# Patient Record
Sex: FEMALE | Race: WHITE | Employment: OTHER | URBAN - METROPOLITAN AREA
[De-identification: names, ages, dates, MRNs, and addresses within clinical notes are randomized per-mention and may not be internally consistent; named-entity substitution may affect disease eponyms.]

---

## 2017-02-24 RX ORDER — LOSARTAN POTASSIUM 25 MG/1
TABLET ORAL
Qty: 90 TABLET | Refills: 0 | OUTPATIENT
Start: 2017-02-24

## 2017-02-24 RX ORDER — ATORVASTATIN CALCIUM 10 MG/1
TABLET, FILM COATED ORAL
Qty: 90 TABLET | Refills: 0 | OUTPATIENT
Start: 2017-02-24

## 2017-03-23 LAB
ABSOLUTE BASO #: 0.1 K/UL (ref 0–0.1)
ABSOLUTE EOS #: 0.3 K/UL (ref 0.1–0.4)
ABSOLUTE LYMPH #: 1.4 K/UL (ref 0.8–5.2)
ABSOLUTE MONO #: 0.4 K/UL (ref 0.1–0.9)
ABSOLUTE NEUT #: 3 K/UL (ref 1.3–9.1)
ANION GAP SERPL CALCULATED.3IONS-SCNC: 13 MMOL/L
BASOPHILS RELATIVE PERCENT: 1.8 %
BUN BLDV-MCNC: 17 MG/DL (ref 10–20)
CALCIUM SERPL-MCNC: 9.7 MG/DL (ref 8.7–10.8)
CHLORIDE BLD-SCNC: 103 MMOL/L (ref 95–111)
CO2: 28 MMOL/L (ref 21–32)
CREAT SERPL-MCNC: 1.2 MG/DL (ref 0.5–1.3)
EGFR AFRICAN AMERICAN: 54
EGFR IF NONAFRICAN AMERICAN: 44
EOSINOPHILS RELATIVE PERCENT: 5.8 %
GLUCOSE: 92 MG/DL (ref 70–100)
HCT VFR BLD CALC: 39.1 % (ref 36–48)
HEMOGLOBIN: 13 G/DL (ref 12–16)
LYMPHOCYTE %: 26.8 %
MCH RBC QN AUTO: 28.5 PG (ref 27–34)
MCHC RBC AUTO-ENTMCNC: 33.2 G/DL (ref 31–36)
MCV RBC AUTO: 85.7 FL (ref 80–100)
MONOCYTES # BLD: 6.8 %
NEUTROPHILS RELATIVE PERCENT: 58.6 %
PDW BLD-RTO: 12.5 % (ref 10.8–14.8)
PLATELETS: 295 K/UL (ref 150–450)
POTASSIUM SERPL-SCNC: 5.4 MMOL/L (ref 3.5–5.4)
RBC: 4.56 M/UL (ref 4–5.5)
SODIUM BLD-SCNC: 139 MMOL/L (ref 134–147)
WBC: 5.1 K/UL (ref 3.7–10.8)

## 2017-03-29 ENCOUNTER — OFFICE VISIT (OUTPATIENT)
Dept: NEPHROLOGY | Age: 72
End: 2017-03-29

## 2017-03-29 ENCOUNTER — OFFICE VISIT (OUTPATIENT)
Dept: FAMILY MEDICINE CLINIC | Age: 72
End: 2017-03-29

## 2017-03-29 VITALS
SYSTOLIC BLOOD PRESSURE: 128 MMHG | DIASTOLIC BLOOD PRESSURE: 86 MMHG | HEIGHT: 58 IN | HEART RATE: 106 BPM | RESPIRATION RATE: 18 BRPM | OXYGEN SATURATION: 97 % | BODY MASS INDEX: 27.08 KG/M2 | WEIGHT: 129 LBS

## 2017-03-29 VITALS
TEMPERATURE: 97.8 F | WEIGHT: 128.6 LBS | HEART RATE: 96 BPM | SYSTOLIC BLOOD PRESSURE: 116 MMHG | BODY MASS INDEX: 26.88 KG/M2 | RESPIRATION RATE: 20 BRPM | DIASTOLIC BLOOD PRESSURE: 78 MMHG

## 2017-03-29 DIAGNOSIS — J01.00 ACUTE NON-RECURRENT MAXILLARY SINUSITIS: Primary | ICD-10-CM

## 2017-03-29 DIAGNOSIS — J32.0 CHRONIC MAXILLARY SINUSITIS: ICD-10-CM

## 2017-03-29 DIAGNOSIS — N18.30 CKD (CHRONIC KIDNEY DISEASE) STAGE 3, GFR 30-59 ML/MIN (HCC): Primary | ICD-10-CM

## 2017-03-29 PROCEDURE — G8484 FLU IMMUNIZE NO ADMIN: HCPCS | Performed by: INTERNAL MEDICINE

## 2017-03-29 PROCEDURE — 1123F ACP DISCUSS/DSCN MKR DOCD: CPT | Performed by: FAMILY MEDICINE

## 2017-03-29 PROCEDURE — 3017F COLORECTAL CA SCREEN DOC REV: CPT | Performed by: INTERNAL MEDICINE

## 2017-03-29 PROCEDURE — 1090F PRES/ABSN URINE INCON ASSESS: CPT | Performed by: FAMILY MEDICINE

## 2017-03-29 PROCEDURE — G8420 CALC BMI NORM PARAMETERS: HCPCS | Performed by: INTERNAL MEDICINE

## 2017-03-29 PROCEDURE — 3014F SCREEN MAMMO DOC REV: CPT | Performed by: FAMILY MEDICINE

## 2017-03-29 PROCEDURE — 3017F COLORECTAL CA SCREEN DOC REV: CPT | Performed by: FAMILY MEDICINE

## 2017-03-29 PROCEDURE — 99213 OFFICE O/P EST LOW 20 MIN: CPT | Performed by: INTERNAL MEDICINE

## 2017-03-29 PROCEDURE — 99213 OFFICE O/P EST LOW 20 MIN: CPT | Performed by: FAMILY MEDICINE

## 2017-03-29 PROCEDURE — G8427 DOCREV CUR MEDS BY ELIG CLIN: HCPCS | Performed by: FAMILY MEDICINE

## 2017-03-29 PROCEDURE — 4040F PNEUMOC VAC/ADMIN/RCVD: CPT | Performed by: FAMILY MEDICINE

## 2017-03-29 PROCEDURE — 1036F TOBACCO NON-USER: CPT | Performed by: INTERNAL MEDICINE

## 2017-03-29 PROCEDURE — G8420 CALC BMI NORM PARAMETERS: HCPCS | Performed by: FAMILY MEDICINE

## 2017-03-29 PROCEDURE — G8399 PT W/DXA RESULTS DOCUMENT: HCPCS | Performed by: FAMILY MEDICINE

## 2017-03-29 PROCEDURE — 1123F ACP DISCUSS/DSCN MKR DOCD: CPT | Performed by: INTERNAL MEDICINE

## 2017-03-29 PROCEDURE — 4040F PNEUMOC VAC/ADMIN/RCVD: CPT | Performed by: INTERNAL MEDICINE

## 2017-03-29 PROCEDURE — G8427 DOCREV CUR MEDS BY ELIG CLIN: HCPCS | Performed by: INTERNAL MEDICINE

## 2017-03-29 PROCEDURE — G8399 PT W/DXA RESULTS DOCUMENT: HCPCS | Performed by: INTERNAL MEDICINE

## 2017-03-29 PROCEDURE — 1036F TOBACCO NON-USER: CPT | Performed by: FAMILY MEDICINE

## 2017-03-29 PROCEDURE — 3014F SCREEN MAMMO DOC REV: CPT | Performed by: INTERNAL MEDICINE

## 2017-03-29 PROCEDURE — G8484 FLU IMMUNIZE NO ADMIN: HCPCS | Performed by: FAMILY MEDICINE

## 2017-03-29 PROCEDURE — 1090F PRES/ABSN URINE INCON ASSESS: CPT | Performed by: INTERNAL MEDICINE

## 2017-03-29 RX ORDER — CEFUROXIME AXETIL 250 MG/1
250 TABLET ORAL 2 TIMES DAILY
Qty: 42 TABLET | Refills: 0 | Status: SHIPPED | OUTPATIENT
Start: 2017-03-29 | End: 2017-04-19

## 2017-03-29 ASSESSMENT — ENCOUNTER SYMPTOMS
DIARRHEA: 0
SORE THROAT: 0
NAUSEA: 0
RHINORRHEA: 0
ABDOMINAL PAIN: 0
CHEST TIGHTNESS: 0
SHORTNESS OF BREATH: 0
VOMITING: 0
EYES NEGATIVE: 1
BACK PAIN: 0
COUGH: 1

## 2017-05-09 ENCOUNTER — OFFICE VISIT (OUTPATIENT)
Dept: FAMILY MEDICINE CLINIC | Age: 72
End: 2017-05-09

## 2017-05-09 VITALS
TEMPERATURE: 97.9 F | HEART RATE: 92 BPM | BODY MASS INDEX: 26.75 KG/M2 | RESPIRATION RATE: 12 BRPM | SYSTOLIC BLOOD PRESSURE: 112 MMHG | WEIGHT: 128 LBS | DIASTOLIC BLOOD PRESSURE: 70 MMHG

## 2017-05-09 DIAGNOSIS — J01.00 ACUTE MAXILLARY SINUSITIS, RECURRENCE NOT SPECIFIED: Primary | ICD-10-CM

## 2017-05-09 PROCEDURE — 4040F PNEUMOC VAC/ADMIN/RCVD: CPT | Performed by: FAMILY MEDICINE

## 2017-05-09 PROCEDURE — 1036F TOBACCO NON-USER: CPT | Performed by: FAMILY MEDICINE

## 2017-05-09 PROCEDURE — 96372 THER/PROPH/DIAG INJ SC/IM: CPT | Performed by: FAMILY MEDICINE

## 2017-05-09 PROCEDURE — 3017F COLORECTAL CA SCREEN DOC REV: CPT | Performed by: FAMILY MEDICINE

## 2017-05-09 PROCEDURE — 99213 OFFICE O/P EST LOW 20 MIN: CPT | Performed by: FAMILY MEDICINE

## 2017-05-09 PROCEDURE — 1090F PRES/ABSN URINE INCON ASSESS: CPT | Performed by: FAMILY MEDICINE

## 2017-05-09 PROCEDURE — 1123F ACP DISCUSS/DSCN MKR DOCD: CPT | Performed by: FAMILY MEDICINE

## 2017-05-09 PROCEDURE — G8420 CALC BMI NORM PARAMETERS: HCPCS | Performed by: FAMILY MEDICINE

## 2017-05-09 PROCEDURE — G8399 PT W/DXA RESULTS DOCUMENT: HCPCS | Performed by: FAMILY MEDICINE

## 2017-05-09 PROCEDURE — 3014F SCREEN MAMMO DOC REV: CPT | Performed by: FAMILY MEDICINE

## 2017-05-09 PROCEDURE — G8427 DOCREV CUR MEDS BY ELIG CLIN: HCPCS | Performed by: FAMILY MEDICINE

## 2017-05-09 RX ORDER — METHYLPREDNISOLONE ACETATE 80 MG/ML
160 INJECTION, SUSPENSION INTRA-ARTICULAR; INTRALESIONAL; INTRAMUSCULAR; SOFT TISSUE ONCE
Status: COMPLETED | OUTPATIENT
Start: 2017-05-09 | End: 2017-05-09

## 2017-05-09 RX ORDER — DOXYCYCLINE HYCLATE 100 MG/1
100 CAPSULE ORAL 2 TIMES DAILY
Qty: 20 CAPSULE | Refills: 0 | Status: SHIPPED | OUTPATIENT
Start: 2017-05-09 | End: 2017-05-19

## 2017-05-09 RX ADMIN — METHYLPREDNISOLONE ACETATE 160 MG: 80 INJECTION, SUSPENSION INTRA-ARTICULAR; INTRALESIONAL; INTRAMUSCULAR; SOFT TISSUE at 09:35

## 2017-05-09 ASSESSMENT — ENCOUNTER SYMPTOMS
VOMITING: 0
NAUSEA: 0
SINUS PRESSURE: 1
ABDOMINAL PAIN: 0
DIARRHEA: 0
SORE THROAT: 0
BACK PAIN: 0
EYES NEGATIVE: 1
COUGH: 1
SHORTNESS OF BREATH: 0
CHEST TIGHTNESS: 0
RHINORRHEA: 0

## 2017-05-22 ENCOUNTER — TELEPHONE (OUTPATIENT)
Dept: FAMILY MEDICINE CLINIC | Age: 72
End: 2017-05-22

## 2017-05-22 DIAGNOSIS — R05.3 CHRONIC COUGH: Primary | ICD-10-CM

## 2017-05-25 ENCOUNTER — TELEPHONE (OUTPATIENT)
Dept: FAMILY MEDICINE CLINIC | Age: 72
End: 2017-05-25

## 2017-05-25 DIAGNOSIS — J32.8 OTHER CHRONIC SINUSITIS: Primary | ICD-10-CM

## 2017-05-26 ENCOUNTER — TELEPHONE (OUTPATIENT)
Dept: FAMILY MEDICINE CLINIC | Age: 72
End: 2017-05-26

## 2017-06-06 ENCOUNTER — TELEPHONE (OUTPATIENT)
Dept: FAMILY MEDICINE CLINIC | Age: 72
End: 2017-06-06

## 2017-06-06 DIAGNOSIS — R05.3 CHRONIC COUGH: Primary | ICD-10-CM

## 2017-06-28 ENCOUNTER — TELEPHONE (OUTPATIENT)
Dept: FAMILY MEDICINE CLINIC | Age: 72
End: 2017-06-28

## 2017-06-28 DIAGNOSIS — R05.9 COUGH: Primary | ICD-10-CM

## 2017-06-28 DIAGNOSIS — R94.2 ABNORMAL PFTS (PULMONARY FUNCTION TESTS): ICD-10-CM

## 2017-06-30 RX ORDER — ALBUTEROL SULFATE 90 UG/1
2 AEROSOL, METERED RESPIRATORY (INHALATION) EVERY 6 HOURS PRN
Qty: 1 INHALER | Refills: 3 | Status: SHIPPED | OUTPATIENT
Start: 2017-06-30 | End: 2018-02-08 | Stop reason: ALTCHOICE

## 2017-09-19 LAB
ABSOLUTE BASO #: 0.1 K/UL (ref 0–0.1)
ABSOLUTE EOS #: 0.2 K/UL (ref 0.1–0.4)
ABSOLUTE LYMPH #: 1.3 K/UL (ref 0.8–5.2)
ABSOLUTE MONO #: 0.4 K/UL (ref 0.1–0.9)
ABSOLUTE NEUT #: 3.4 K/UL (ref 1.3–9.1)
ANION GAP SERPL CALCULATED.3IONS-SCNC: 14 MMOL/L
BASOPHILS RELATIVE PERCENT: 1.3 %
BUN BLDV-MCNC: 22 MG/DL (ref 10–20)
CALCIUM SERPL-MCNC: 9.6 MG/DL (ref 8.7–10.8)
CHLORIDE BLD-SCNC: 103 MMOL/L (ref 95–111)
CO2: 27 MMOL/L (ref 21–32)
CREAT SERPL-MCNC: 1.2 MG/DL (ref 0.5–1.3)
EGFR AFRICAN AMERICAN: 54
EGFR IF NONAFRICAN AMERICAN: 44
EOSINOPHILS RELATIVE PERCENT: 4.2 %
GLUCOSE: 93 MG/DL (ref 70–100)
HCT VFR BLD CALC: 37.1 % (ref 36–48)
HEMOGLOBIN: 12.5 G/DL (ref 12–16)
LYMPHOCYTE %: 24.5 %
MCH RBC QN AUTO: 29.6 PG (ref 27–34)
MCHC RBC AUTO-ENTMCNC: 33.7 G/DL (ref 31–36)
MCV RBC AUTO: 87.7 FL (ref 80–100)
MONOCYTES # BLD: 7.6 %
NEUTROPHILS RELATIVE PERCENT: 62 %
PDW BLD-RTO: 12.4 % (ref 10.8–14.8)
PLATELETS: 298 K/UL (ref 150–450)
POTASSIUM SERPL-SCNC: 5.2 MMOL/L (ref 3.5–5.4)
RBC: 4.23 M/UL (ref 4–5.5)
SODIUM BLD-SCNC: 139 MMOL/L (ref 134–147)
WBC: 5.4 K/UL (ref 3.7–10.8)

## 2017-10-25 ENCOUNTER — OFFICE VISIT (OUTPATIENT)
Dept: NEPHROLOGY | Age: 72
End: 2017-10-25
Payer: MEDICARE

## 2017-10-25 VITALS
HEIGHT: 58 IN | SYSTOLIC BLOOD PRESSURE: 118 MMHG | OXYGEN SATURATION: 98 % | HEART RATE: 93 BPM | DIASTOLIC BLOOD PRESSURE: 64 MMHG | BODY MASS INDEX: 25.61 KG/M2 | WEIGHT: 122 LBS | RESPIRATION RATE: 18 BRPM

## 2017-10-25 DIAGNOSIS — N18.30 CKD (CHRONIC KIDNEY DISEASE), STAGE III (HCC): Primary | ICD-10-CM

## 2017-10-25 PROCEDURE — 3017F COLORECTAL CA SCREEN DOC REV: CPT | Performed by: INTERNAL MEDICINE

## 2017-10-25 PROCEDURE — G8484 FLU IMMUNIZE NO ADMIN: HCPCS | Performed by: INTERNAL MEDICINE

## 2017-10-25 PROCEDURE — 99213 OFFICE O/P EST LOW 20 MIN: CPT | Performed by: INTERNAL MEDICINE

## 2017-10-25 PROCEDURE — G8427 DOCREV CUR MEDS BY ELIG CLIN: HCPCS | Performed by: INTERNAL MEDICINE

## 2017-10-25 PROCEDURE — 4040F PNEUMOC VAC/ADMIN/RCVD: CPT | Performed by: INTERNAL MEDICINE

## 2017-10-25 PROCEDURE — 3014F SCREEN MAMMO DOC REV: CPT | Performed by: INTERNAL MEDICINE

## 2017-10-25 PROCEDURE — 1090F PRES/ABSN URINE INCON ASSESS: CPT | Performed by: INTERNAL MEDICINE

## 2017-10-25 PROCEDURE — 1123F ACP DISCUSS/DSCN MKR DOCD: CPT | Performed by: INTERNAL MEDICINE

## 2017-10-25 PROCEDURE — G8417 CALC BMI ABV UP PARAM F/U: HCPCS | Performed by: INTERNAL MEDICINE

## 2017-10-25 PROCEDURE — 1036F TOBACCO NON-USER: CPT | Performed by: INTERNAL MEDICINE

## 2017-10-25 PROCEDURE — G8399 PT W/DXA RESULTS DOCUMENT: HCPCS | Performed by: INTERNAL MEDICINE

## 2017-10-25 NOTE — PROGRESS NOTES
09/18/2017    HCT 37.1 09/18/2017    MCV 87.7 09/18/2017     09/18/2017     BMP:    Lab Results   Component Value Date     09/18/2017     03/22/2017     12/14/2016    K 5.2 09/18/2017    K 5.4 03/22/2017    K 4.7 12/14/2016     09/18/2017     03/22/2017     12/14/2016    CO2 27 09/18/2017    CO2 28 03/22/2017    CO2 28 12/14/2016    BUN 22 (H) 09/18/2017    BUN 17 03/22/2017    BUN 18 12/14/2016    CREATININE 1.2 09/18/2017    CREATININE 1.2 03/22/2017    CREATININE 1.3 12/14/2016    GLUCOSE 93 09/18/2017    GLUCOSE 92 03/22/2017    GLUCOSE 93 12/14/2016      Hepatic:   Lab Results   Component Value Date    AST 27 12/14/2016    AST 26 12/10/2015    AST 23 12/03/2014    ALT 25 12/14/2016    ALT 20 12/10/2015    ALT 21 12/03/2014    BILITOT 0.5 12/14/2016    BILITOT 0.6 12/10/2015    BILITOT 0.6 12/03/2014    ALKPHOS 78 12/14/2016    ALKPHOS 86 12/10/2015    ALKPHOS 76 12/03/2014     BNP: No results found for: BNP  Lipids:   Lab Results   Component Value Date    CHOL 182 12/14/2016    HDL 71 (H) 12/14/2016     INR: No results found for: INR  URINE: No results found for: NAUR, PROTUR  Lab Results   Component Value Date    NITRU Negative 09/03/2015    COLORU YELLOW 05/08/2014    PHUR 6.5 09/03/2015    RBCUA 0 04/16/2014    YEAST 0 04/16/2014    BACTERIA 0 04/16/2014    CLARITYU Clear 04/16/2014    LEUKOCYTESUR neg 09/03/2015    LEUKOCYTESUR NEGATIVE 05/08/2014    UROBILINOGEN 0.2 05/08/2014    BILIRUBINUR NEGATIVE 05/08/2014    BLOODU Negative 09/03/2015    GLUCOSEU neg 09/03/2015    KETUA Negative 09/03/2015      Microalbumen/Creatinine ratio:  No components found for: RUCREAT        Medications:    Current Outpatient Prescriptions   Medication Sig Dispense Refill    albuterol sulfate HFA (PROAIR HFA) 108 (90 Base) MCG/ACT inhaler Inhale 2 puffs into the lungs every 6 hours as needed for Wheezing 1 Inhaler 3    lansoprazole (PREVACID SOLUTAB) 15 MG disintegrating tablet Take 15 mg by mouth daily      ALPRAZolam (XANAX) 0.25 MG tablet Take 1 tablet by mouth nightly as needed for Sleep or Anxiety 90 tablet 0    atorvastatin (LIPITOR) 10 MG tablet TAKE 1 TABLET DAILY 90 tablet 3    losartan (COZAAR) 25 MG tablet Take 1 tablet by mouth daily 90 tablet 3    calcitonin (MIACALCIN) 200 UNIT/ACT nasal spray 1 spray by Nasal route daily 3 Bottle 3    bumetanide (BUMEX) 0.5 MG tablet TAKE 1 TABLET BY MOUTH AS NEEDED 30 tablet 3    acetaminophen (TYLENOL) 500 MG tablet Take 1,000 mg by mouth as needed.  Calcium + D (CALTRATE) 600-200 MG-UNIT tablet Take 2 tablets by mouth daily.  aspirin 81 MG EC tablet Take 81 mg by mouth daily.  therapeutic multivitamin-minerals (THERAGRAN-M) tablet Take 1 tablet by mouth daily. No current facility-administered medications for this visit. 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 We discussed the eGFR today. 2. We will continue all current medications without changes. 3. We will see the patient back in 6 months. Total time spent interviewing the patient and/or family, evaluating lab data and X-ray data and processing orders was 20 minutes. I personally spent more than 50% of the visit time face to face with the patient providing counseling and coordination of the patient's care.             _________________________________  Mague Moralez DO  [facility]      Sravanthi Rhoades MD

## 2017-11-02 ENCOUNTER — OFFICE VISIT (OUTPATIENT)
Dept: PULMONOLOGY | Age: 72
End: 2017-11-02
Payer: MEDICARE

## 2017-11-02 VITALS
SYSTOLIC BLOOD PRESSURE: 118 MMHG | WEIGHT: 121.8 LBS | OXYGEN SATURATION: 99 % | DIASTOLIC BLOOD PRESSURE: 72 MMHG | TEMPERATURE: 97.5 F | HEIGHT: 58 IN | BODY MASS INDEX: 25.57 KG/M2 | HEART RATE: 95 BPM

## 2017-11-02 DIAGNOSIS — R05.9 COUGH: Primary | ICD-10-CM

## 2017-11-02 PROCEDURE — 4040F PNEUMOC VAC/ADMIN/RCVD: CPT | Performed by: INTERNAL MEDICINE

## 2017-11-02 PROCEDURE — G8417 CALC BMI ABV UP PARAM F/U: HCPCS | Performed by: INTERNAL MEDICINE

## 2017-11-02 PROCEDURE — 99203 OFFICE O/P NEW LOW 30 MIN: CPT | Performed by: INTERNAL MEDICINE

## 2017-11-02 PROCEDURE — 3014F SCREEN MAMMO DOC REV: CPT | Performed by: INTERNAL MEDICINE

## 2017-11-02 PROCEDURE — G8399 PT W/DXA RESULTS DOCUMENT: HCPCS | Performed by: INTERNAL MEDICINE

## 2017-11-02 PROCEDURE — 3017F COLORECTAL CA SCREEN DOC REV: CPT | Performed by: INTERNAL MEDICINE

## 2017-11-02 PROCEDURE — 1123F ACP DISCUSS/DSCN MKR DOCD: CPT | Performed by: INTERNAL MEDICINE

## 2017-11-02 PROCEDURE — 1036F TOBACCO NON-USER: CPT | Performed by: INTERNAL MEDICINE

## 2017-11-02 PROCEDURE — G8484 FLU IMMUNIZE NO ADMIN: HCPCS | Performed by: INTERNAL MEDICINE

## 2017-11-02 PROCEDURE — 1090F PRES/ABSN URINE INCON ASSESS: CPT | Performed by: INTERNAL MEDICINE

## 2017-11-02 PROCEDURE — G8427 DOCREV CUR MEDS BY ELIG CLIN: HCPCS | Performed by: INTERNAL MEDICINE

## 2017-11-02 ASSESSMENT — ENCOUNTER SYMPTOMS
TROUBLE SWALLOWING: 0
VOICE CHANGE: 0
FACIAL SWELLING: 0
SHORTNESS OF BREATH: 0
CHEST TIGHTNESS: 0
WHEEZING: 0
CHOKING: 0
SORE THROAT: 0
COUGH: 1
STRIDOR: 0

## 2017-11-02 NOTE — PROGRESS NOTES
 BREAST SURGERY  2009    Lumpectomy of the Left breast     SECTION  1971 and [address] [facility]  2006    COLONOSCOPY  2004    HEMORRHOID SURGERY  2004    HYSTERECTOMY  1985    SKIN BIOPSY Right 2013    Squemous cell cancer Right leg    SKIN BIOPSY Left 2013    Squemous cell cancer Left leg    SKIN CANCER EXCISION Left 13    Exc SCC Left Lower Leg with flap    TOTAL NEPHRECTOMY  2006    right     Social History   Substance Use Topics    Smoking status: Never Smoker    Smokeless tobacco: Never Used    Alcohol use No      Allergies   Allergen Reactions    Cipro Xr Swelling and Rash      Family History   Problem Relation Age of Onset    Cancer Mother      melanoma, tongue    Hypertension Mother     High Cholesterol Mother     Heart Failure Father      Current Outpatient Prescriptions   Medication Sig Dispense Refill    albuterol sulfate HFA (PROAIR HFA) 108 (90 Base) MCG/ACT inhaler Inhale 2 puffs into the lungs every 6 hours as needed for Wheezing 1 Inhaler 3    lansoprazole (PREVACID SOLUTAB) 15 MG disintegrating tablet Take 15 mg by mouth daily      ALPRAZolam (XANAX) 0.25 MG tablet Take 1 tablet by mouth nightly as needed for Sleep or Anxiety 90 tablet 0    atorvastatin (LIPITOR) 10 MG tablet TAKE 1 TABLET DAILY 90 tablet 3    losartan (COZAAR) 25 MG tablet Take 1 tablet by mouth daily 90 tablet 3    calcitonin (MIACALCIN) 200 UNIT/ACT nasal spray 1 spray by Nasal route daily 3 Bottle 3    bumetanide (BUMEX) 0.5 MG tablet TAKE 1 TABLET BY MOUTH AS NEEDED 30 tablet 3    acetaminophen (TYLENOL) 500 MG tablet Take 1,000 mg by mouth as needed.  Calcium + D (CALTRATE) 600-200 MG-UNIT tablet Take 2 tablets by mouth daily.  aspirin 81 MG EC tablet Take 81 mg by mouth daily.  therapeutic multivitamin-minerals (THERAGRAN-M) tablet Take 1 tablet by mouth daily. No current facility-administered medications for this visit.       LABS - none    BP

## 2017-12-16 ENCOUNTER — HOSPITAL ENCOUNTER (EMERGENCY)
Dept: GENERAL RADIOLOGY | Age: 72
Discharge: HOME | End: 2017-12-16
Payer: MEDICARE

## 2017-12-16 ENCOUNTER — HOSPITAL ENCOUNTER (EMERGENCY)
Age: 72
Discharge: HOME | End: 2017-12-16
Payer: MEDICARE

## 2017-12-16 VITALS
RESPIRATION RATE: 18 BRPM | OXYGEN SATURATION: 97 % | HEIGHT: 58 IN | WEIGHT: 118 LBS | BODY MASS INDEX: 24.77 KG/M2 | TEMPERATURE: 97.9 F | HEART RATE: 96 BPM | DIASTOLIC BLOOD PRESSURE: 69 MMHG | SYSTOLIC BLOOD PRESSURE: 105 MMHG
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
## 2017-12-16 DIAGNOSIS — S00.83XA CONTUSION OF FOREHEAD, INITIAL ENCOUNTER: ICD-10-CM
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
## 2017-12-16 DIAGNOSIS — S00.33XA CONTUSION OF NOSE, INITIAL ENCOUNTER: ICD-10-CM

## 2017-12-16 DIAGNOSIS — S01.511A LIP LACERATION, INITIAL ENCOUNTER: ICD-10-CM

## 2017-12-16 PROCEDURE — 99213 OFFICE O/P EST LOW 20 MIN: CPT | Performed by: NURSE PRACTITIONER

## 2017-12-16 PROCEDURE — 99214 OFFICE O/P EST MOD 30 MIN: CPT

## 2017-12-16 PROCEDURE — 70150 X-RAY EXAM OF FACIAL BONES: CPT

## 2017-12-16 RX ORDER — MUPIROCIN CALCIUM 20 MG/G
CREAM TOPICAL
Qty: 1 TUBE | Refills: 0 | Status: SHIPPED | OUTPATIENT
Start: 2017-12-16 | End: 2018-01-15

## 2017-12-16 ASSESSMENT — ENCOUNTER SYMPTOMS
SHORTNESS OF BREATH: 0
VOMITING: 0
ABDOMINAL PAIN: 0
DIARRHEA: 0
CHEST TIGHTNESS: 0
FACIAL SWELLING: 1
NAUSEA: 0

## 2017-12-16 ASSESSMENT — PAIN DESCRIPTION - FREQUENCY: FREQUENCY: CONTINUOUS

## 2017-12-16 ASSESSMENT — PAIN DESCRIPTION - LOCATION: LOCATION: FACE;HEAD

## 2017-12-16 ASSESSMENT — PAIN DESCRIPTION - PAIN TYPE: TYPE: ACUTE PAIN

## 2017-12-16 ASSESSMENT — PAIN DESCRIPTION - ORIENTATION: ORIENTATION: RIGHT

## 2017-12-16 ASSESSMENT — PAIN DESCRIPTION - DESCRIPTORS: DESCRIPTORS: ACHING

## 2017-12-16 ASSESSMENT — PAIN SCALES - GENERAL: PAINLEVEL_OUTOF10: 5

## 2017-12-16 NOTE — ED TRIAGE NOTES
Patient to room with c/o facial injury post fall this morning. Patient states, \"I was at my house, and I was going down the stairs. I slid and hit the post at the bottom of the stairs with the right side of my face and head. \" Discoloration and edema noted to forehead. Denies nausea. Denies loss of consciousness. States bloody nose at time of accident.

## 2017-12-16 NOTE — ED PROVIDER NOTES
teaching(s) and printed instructions attached to after visit summary. PATIENT REFERRED TO:  Jo Carbone MD  [address] [facility] [address] [phone]    Schedule an appointment as soon as possible for a visit in 2 days  for further evalation, As needed, If symptoms worsen, GO DIRECTLY TO [facility]  [address]  [phone]    As needed, If symptoms worsen, GO DIRECTLY TO THE EMERGENCY DEPARTMENT    DISCHARGE MEDICATIONS:  Discharge Medication List as of 12/16/2017  1:55 PM      START taking these medications    Details   mupirocin (BACTROBAN) 2 % cream Apply topically 3 times daily. , Disp-1 Tube, R-0, Print           Discharge Medication List as of 12/16/2017  1:55 PM          Myles Pettit, [address] [place] [address], [facility]  12/16/17 [id number]

## 2018-02-08 ENCOUNTER — OFFICE VISIT (OUTPATIENT)
Dept: FAMILY MEDICINE CLINIC | Age: 73
End: 2018-02-08
Payer: MEDICARE

## 2018-02-08 VITALS
RESPIRATION RATE: 16 BRPM | SYSTOLIC BLOOD PRESSURE: 126 MMHG | WEIGHT: 121.2 LBS | TEMPERATURE: 97.7 F | DIASTOLIC BLOOD PRESSURE: 84 MMHG | HEART RATE: 92 BPM | BODY MASS INDEX: 25.33 KG/M2

## 2018-02-08 DIAGNOSIS — M85.80 OSTEOPENIA, UNSPECIFIED LOCATION: ICD-10-CM

## 2018-02-08 DIAGNOSIS — Z91.81 AT HIGH RISK FOR FALLS: ICD-10-CM

## 2018-02-08 DIAGNOSIS — E78.5 HYPERLIPIDEMIA, UNSPECIFIED HYPERLIPIDEMIA TYPE: Primary | Chronic | ICD-10-CM

## 2018-02-08 DIAGNOSIS — I10 ESSENTIAL HYPERTENSION: Chronic | ICD-10-CM

## 2018-02-08 PROCEDURE — G8417 CALC BMI ABV UP PARAM F/U: HCPCS | Performed by: FAMILY MEDICINE

## 2018-02-08 PROCEDURE — 1123F ACP DISCUSS/DSCN MKR DOCD: CPT | Performed by: FAMILY MEDICINE

## 2018-02-08 PROCEDURE — 1090F PRES/ABSN URINE INCON ASSESS: CPT | Performed by: FAMILY MEDICINE

## 2018-02-08 PROCEDURE — 1036F TOBACCO NON-USER: CPT | Performed by: FAMILY MEDICINE

## 2018-02-08 PROCEDURE — 99214 OFFICE O/P EST MOD 30 MIN: CPT | Performed by: FAMILY MEDICINE

## 2018-02-08 PROCEDURE — G8399 PT W/DXA RESULTS DOCUMENT: HCPCS | Performed by: FAMILY MEDICINE

## 2018-02-08 PROCEDURE — 3017F COLORECTAL CA SCREEN DOC REV: CPT | Performed by: FAMILY MEDICINE

## 2018-02-08 PROCEDURE — G8484 FLU IMMUNIZE NO ADMIN: HCPCS | Performed by: FAMILY MEDICINE

## 2018-02-08 PROCEDURE — 3014F SCREEN MAMMO DOC REV: CPT | Performed by: FAMILY MEDICINE

## 2018-02-08 PROCEDURE — 4040F PNEUMOC VAC/ADMIN/RCVD: CPT | Performed by: FAMILY MEDICINE

## 2018-02-08 PROCEDURE — G8427 DOCREV CUR MEDS BY ELIG CLIN: HCPCS | Performed by: FAMILY MEDICINE

## 2018-02-08 RX ORDER — ATORVASTATIN CALCIUM 10 MG/1
TABLET, FILM COATED ORAL
Qty: 90 TABLET | Refills: 3 | Status: SHIPPED | OUTPATIENT
Start: 2018-02-08 | End: 2018-03-02 | Stop reason: SDUPTHER

## 2018-02-08 RX ORDER — LOSARTAN POTASSIUM 25 MG/1
25 TABLET ORAL DAILY
Qty: 90 TABLET | Refills: 3 | Status: SHIPPED | OUTPATIENT
Start: 2018-02-08 | End: 2018-03-02 | Stop reason: SDUPTHER

## 2018-02-08 RX ORDER — CALCITONIN SALMON 200 [IU]/.09ML
1 SPRAY, METERED NASAL DAILY
Qty: 3 BOTTLE | Refills: 3 | Status: SHIPPED | OUTPATIENT
Start: 2018-02-08 | End: 2018-03-02 | Stop reason: SDUPTHER

## 2018-02-08 RX ORDER — ALPRAZOLAM 0.25 MG/1
0.25 TABLET ORAL NIGHTLY PRN
Qty: 90 TABLET | Refills: 0 | Status: SHIPPED | OUTPATIENT
Start: 2018-02-08 | End: 2018-05-09

## 2018-02-08 ASSESSMENT — PATIENT HEALTH QUESTIONNAIRE - PHQ9
SUM OF ALL RESPONSES TO PHQ9 QUESTIONS 1 & 2: 0
2. FEELING DOWN, DEPRESSED OR HOPELESS: 0
SUM OF ALL RESPONSES TO PHQ QUESTIONS 1-9: 0
1. LITTLE INTEREST OR PLEASURE IN DOING THINGS: 0

## 2018-02-12 ASSESSMENT — ENCOUNTER SYMPTOMS
COUGH: 0
EYES NEGATIVE: 1
ABDOMINAL PAIN: 0
CHEST TIGHTNESS: 0
BACK PAIN: 0
DIARRHEA: 0
NAUSEA: 0
RHINORRHEA: 0
SHORTNESS OF BREATH: 0
VOMITING: 0
SORE THROAT: 0

## 2018-02-12 NOTE — PROGRESS NOTES
Squemous cell cancer Left leg    SKIN CANCER EXCISION Left 12/2/13    Exc SCC Left Lower Leg with flap    TOTAL NEPHRECTOMY  2006    right     Family History   Problem Relation Age of Onset   Rene Perdue Cancer Mother      melanoma, tongue    Hypertension Mother     High Cholesterol Mother     Heart Failure Father      Social History   Substance Use Topics    Smoking status: Never Smoker    Smokeless tobacco: Never Used    Alcohol use No      Current Outpatient Prescriptions   Medication Sig Dispense Refill    ALPRAZolam (XANAX) 0.25 MG tablet Take 1 tablet by mouth nightly as needed for Sleep or Anxiety for up to 90 days. 90 tablet 0    atorvastatin (LIPITOR) 10 MG tablet TAKE 1 TABLET DAILY 90 tablet 3    losartan (COZAAR) 25 MG tablet Take 1 tablet by mouth daily 90 tablet 3    calcitonin (MIACALCIN) 200 UNIT/ACT nasal spray 1 spray by Nasal route daily 3 Bottle 3    lansoprazole (PREVACID SOLUTAB) 15 MG disintegrating tablet Take 15 mg by mouth daily      bumetanide (BUMEX) 0.5 MG tablet TAKE 1 TABLET BY MOUTH AS NEEDED 30 tablet 3    acetaminophen (TYLENOL) 500 MG tablet Take 1,000 mg by mouth as needed.  Calcium + D (CALTRATE) 600-200 MG-UNIT tablet Take 2 tablets by mouth daily.  aspirin 81 MG EC tablet Take 81 mg by mouth daily.  therapeutic multivitamin-minerals (THERAGRAN-M) tablet Take 1 tablet by mouth daily. No current facility-administered medications for this visit.       Allergies   Allergen Reactions    Cipro Xr Swelling and Rash     Health Maintenance   Topic Date Due    DTaP/Tdap/Td vaccine (1 - Tdap) 01/02/1997    Potassium monitoring  09/18/2018    Creatinine monitoring  09/18/2018    Breast cancer screen  05/08/2019    Lipid screen  12/14/2021    Colon cancer screen colonoscopy  04/24/2025    Zostavax vaccine  Completed    DEXA (modify frequency per FRAX score)  Completed    Flu vaccine  Completed    Pneumococcal low/med risk  Completed    Hepatitis C

## 2018-02-13 LAB
ALBUMIN SERPL-MCNC: 4.5 G/DL (ref 3.5–5.2)
ALK PHOSPHATASE: 78 U/L (ref 30–146)
ALT SERPL-CCNC: 21 U/L (ref 5–40)
AST SERPL-CCNC: 23 U/L (ref 9–40)
BILIRUB SERPL-MCNC: 0.7 MG/DL
BILIRUBIN DIRECT: <0.2 MG/DL
CHOLESTEROL/HDL RATIO: 2.5
CHOLESTEROL: 173 MG/DL
HDLC SERPL-MCNC: 69 MG/DL
LDL CHOLESTEROL CALCULATED: 85 MG/DL
LDL/HDL RATIO: 1.2
TOTAL PROTEIN: 6.9 G/DL (ref 6.1–8.3)
TRIGL SERPL-MCNC: 97 MG/DL
VLDLC SERPL CALC-MCNC: 19 MG/DL

## 2018-03-04 RX ORDER — CALCITONIN SALMON 200 [IU]/.09ML
SPRAY, METERED NASAL
Qty: 3 BOTTLE | Refills: 3 | Status: SHIPPED | OUTPATIENT
Start: 2018-03-04 | End: 2019-02-27 | Stop reason: SDUPTHER

## 2018-03-04 RX ORDER — ATORVASTATIN CALCIUM 10 MG/1
TABLET, FILM COATED ORAL
Qty: 90 TABLET | Refills: 3 | Status: SHIPPED | OUTPATIENT
Start: 2018-03-04 | End: 2019-02-27 | Stop reason: SDUPTHER

## 2018-03-04 RX ORDER — LOSARTAN POTASSIUM 25 MG/1
TABLET ORAL
Qty: 90 TABLET | Refills: 3 | Status: SHIPPED | OUTPATIENT
Start: 2018-03-04 | End: 2019-02-27 | Stop reason: SDUPTHER

## 2018-04-18 ENCOUNTER — HOSPITAL ENCOUNTER (OUTPATIENT)
Age: 73
Discharge: HOME | End: 2018-04-18
Payer: MEDICARE

## 2018-04-18 ENCOUNTER — HOSPITAL ENCOUNTER (OUTPATIENT)
Dept: ULTRASOUND IMAGING | Age: 73
Discharge: HOME | End: 2018-04-18
Payer: MEDICARE

## 2018-04-18 DIAGNOSIS — N18.30 CKD (CHRONIC KIDNEY DISEASE), STAGE III (HCC): ICD-10-CM

## 2018-04-18 LAB
ANION GAP SERPL CALCULATED.3IONS-SCNC: 12 MEQ/L (ref 8–16)
BASOPHILS # BLD: 1.2 %
BASOPHILS ABSOLUTE: 0.1 THOU/MM3 (ref 0–0.1)
BUN BLDV-MCNC: 18 MG/DL (ref 7–22)
CALCIUM SERPL-MCNC: 9.8 MG/DL (ref 8.5–10.5)
CHLORIDE BLD-SCNC: 101 MEQ/L (ref 98–111)
CO2: 26 MEQ/L (ref 23–33)
CREAT SERPL-MCNC: 1.1 MG/DL (ref 0.4–1.2)
EOSINOPHIL # BLD: 6.4 %
EOSINOPHILS ABSOLUTE: 0.4 THOU/MM3 (ref 0–0.4)
GFR SERPL CREATININE-BSD FRML MDRD: 49 ML/MIN/1.73M2
GLUCOSE BLD-MCNC: 93 MG/DL (ref 70–108)
HCT VFR BLD CALC: 37.1 % (ref 37–47)
HEMOGLOBIN: 12.9 GM/DL (ref 12–16)
LYMPHOCYTES # BLD: 28.5 %
LYMPHOCYTES ABSOLUTE: 1.6 THOU/MM3 (ref 1–4.8)
MCH RBC QN AUTO: 30 PG (ref 27–31)
MCHC RBC AUTO-ENTMCNC: 34.8 GM/DL (ref 33–37)
MCV RBC AUTO: 86.4 FL (ref 81–99)
MONOCYTES # BLD: 7.3 %
MONOCYTES ABSOLUTE: 0.4 THOU/MM3 (ref 0.4–1.3)
NUCLEATED RED BLOOD CELLS: 0 /100 WBC
PDW BLD-RTO: 13.4 % (ref 11.5–14.5)
PLATELET # BLD: 327 THOU/MM3 (ref 130–400)
PMV BLD AUTO: 7.3 FL (ref 7.4–10.4)
POTASSIUM SERPL-SCNC: 4.8 MEQ/L (ref 3.5–5.2)
RBC # BLD: 4.3 MILL/MM3 (ref 4.2–5.4)
SEG NEUTROPHILS: 56.6 %
SEGMENTED NEUTROPHILS ABSOLUTE COUNT: 3.2 THOU/MM3 (ref 1.8–7.7)
SODIUM BLD-SCNC: 139 MEQ/L (ref 135–145)
WBC # BLD: 5.7 THOU/MM3 (ref 4.8–10.8)

## 2018-04-18 PROCEDURE — 85025 COMPLETE CBC W/AUTO DIFF WBC: CPT

## 2018-04-18 PROCEDURE — 80048 BASIC METABOLIC PNL TOTAL CA: CPT

## 2018-04-18 PROCEDURE — 36415 COLL VENOUS BLD VENIPUNCTURE: CPT

## 2018-04-18 PROCEDURE — 76770 US EXAM ABDO BACK WALL COMP: CPT

## 2018-04-25 ENCOUNTER — OFFICE VISIT (OUTPATIENT)
Dept: NEPHROLOGY | Age: 73
End: 2018-04-25
Payer: MEDICARE

## 2018-04-25 VITALS
RESPIRATION RATE: 18 BRPM | OXYGEN SATURATION: 97 % | BODY MASS INDEX: 25.4 KG/M2 | SYSTOLIC BLOOD PRESSURE: 130 MMHG | DIASTOLIC BLOOD PRESSURE: 84 MMHG | WEIGHT: 121 LBS | HEIGHT: 58 IN | HEART RATE: 110 BPM

## 2018-04-25 DIAGNOSIS — N18.30 CKD (CHRONIC KIDNEY DISEASE), STAGE III (HCC): Primary | ICD-10-CM

## 2018-04-25 PROCEDURE — 1090F PRES/ABSN URINE INCON ASSESS: CPT | Performed by: INTERNAL MEDICINE

## 2018-04-25 PROCEDURE — 3017F COLORECTAL CA SCREEN DOC REV: CPT | Performed by: INTERNAL MEDICINE

## 2018-04-25 PROCEDURE — G8427 DOCREV CUR MEDS BY ELIG CLIN: HCPCS | Performed by: INTERNAL MEDICINE

## 2018-04-25 PROCEDURE — G8417 CALC BMI ABV UP PARAM F/U: HCPCS | Performed by: INTERNAL MEDICINE

## 2018-04-25 PROCEDURE — 4040F PNEUMOC VAC/ADMIN/RCVD: CPT | Performed by: INTERNAL MEDICINE

## 2018-04-25 PROCEDURE — 99213 OFFICE O/P EST LOW 20 MIN: CPT | Performed by: INTERNAL MEDICINE

## 2018-04-25 PROCEDURE — G8399 PT W/DXA RESULTS DOCUMENT: HCPCS | Performed by: INTERNAL MEDICINE

## 2018-04-25 PROCEDURE — 1036F TOBACCO NON-USER: CPT | Performed by: INTERNAL MEDICINE

## 2018-04-25 PROCEDURE — 1123F ACP DISCUSS/DSCN MKR DOCD: CPT | Performed by: INTERNAL MEDICINE

## 2018-05-01 ENCOUNTER — HOSPITAL ENCOUNTER (OUTPATIENT)
Dept: WOMENS IMAGING | Age: 73
Discharge: HOME | End: 2018-05-01
Payer: MEDICARE

## 2018-05-01 DIAGNOSIS — Z78.0 POST-MENOPAUSAL: ICD-10-CM

## 2018-05-01 PROCEDURE — 77080 DXA BONE DENSITY AXIAL: CPT

## 2018-10-17 LAB
ABSOLUTE BASO #: 0.1 K/UL (ref 0–0.1)
ABSOLUTE EOS #: 0.4 K/UL (ref 0.1–0.4)
ABSOLUTE LYMPH #: 1.7 K/UL (ref 0.8–5.2)
ABSOLUTE MONO #: 0.4 K/UL (ref 0.1–0.9)
ABSOLUTE NEUT #: 2.9 K/UL (ref 1.3–9.1)
ANION GAP SERPL CALCULATED.3IONS-SCNC: 13 MEQ/L (ref 10–19)
BASOPHILS RELATIVE PERCENT: 1.6 %
BUN BLDV-MCNC: 17 MG/DL (ref 8–23)
CALCIUM SERPL-MCNC: 10 MG/DL (ref 8.5–10.5)
CHLORIDE BLD-SCNC: 104 MEQ/L (ref 95–107)
CO2: 25 MEQ/L (ref 19–31)
CREAT SERPL-MCNC: 1.3 MG/DL (ref 0.6–1.3)
EGFR AFRICAN AMERICAN: 47.8 ML/MIN/1.73 M2
EGFR IF NONAFRICAN AMERICAN: 41.2 ML/MIN/1.73 M2
EOSINOPHILS RELATIVE PERCENT: 7.2 %
GLUCOSE: 95 MG/DL (ref 70–99)
HCT VFR BLD CALC: 37.8 % (ref 36–48)
HEMOGLOBIN: 12.5 G/DL (ref 12–16)
LYMPHOCYTE %: 30.4 %
MCH RBC QN AUTO: 28.6 PG (ref 27–34)
MCHC RBC AUTO-ENTMCNC: 33.1 G/DL (ref 31–36)
MCV RBC AUTO: 86.5 FL (ref 80–100)
MONOCYTES # BLD: 7.7 %
NEUTROPHILS RELATIVE PERCENT: 52.9 %
PDW BLD-RTO: 12.7 % (ref 10.8–14.8)
PLATELETS: 308 K/UL (ref 150–450)
POTASSIUM SERPL-SCNC: 5.4 MEQ/L (ref 3.5–5.4)
RBC: 4.37 M/UL (ref 4–5.5)
SODIUM BLD-SCNC: 142 MEQ/L (ref 135–146)
WBC: 5.6 K/UL (ref 3.7–10.8)

## 2018-10-24 ENCOUNTER — OFFICE VISIT (OUTPATIENT)
Dept: NEPHROLOGY | Age: 73
End: 2018-10-24
Payer: MEDICARE

## 2018-10-24 VITALS
HEART RATE: 92 BPM | DIASTOLIC BLOOD PRESSURE: 70 MMHG | SYSTOLIC BLOOD PRESSURE: 101 MMHG | OXYGEN SATURATION: 97 % | RESPIRATION RATE: 18 BRPM | WEIGHT: 123 LBS | BODY MASS INDEX: 25.82 KG/M2 | HEIGHT: 58 IN

## 2018-10-24 DIAGNOSIS — N18.30 CKD (CHRONIC KIDNEY DISEASE), STAGE III (HCC): Primary | ICD-10-CM

## 2018-10-24 PROCEDURE — 99213 OFFICE O/P EST LOW 20 MIN: CPT | Performed by: INTERNAL MEDICINE

## 2018-10-24 PROCEDURE — 1090F PRES/ABSN URINE INCON ASSESS: CPT | Performed by: INTERNAL MEDICINE

## 2018-10-24 PROCEDURE — G8482 FLU IMMUNIZE ORDER/ADMIN: HCPCS | Performed by: INTERNAL MEDICINE

## 2018-10-24 PROCEDURE — 1123F ACP DISCUSS/DSCN MKR DOCD: CPT | Performed by: INTERNAL MEDICINE

## 2018-10-24 PROCEDURE — G8417 CALC BMI ABV UP PARAM F/U: HCPCS | Performed by: INTERNAL MEDICINE

## 2018-10-24 PROCEDURE — G8399 PT W/DXA RESULTS DOCUMENT: HCPCS | Performed by: INTERNAL MEDICINE

## 2018-10-24 PROCEDURE — 4040F PNEUMOC VAC/ADMIN/RCVD: CPT | Performed by: INTERNAL MEDICINE

## 2018-10-24 PROCEDURE — 1101F PT FALLS ASSESS-DOCD LE1/YR: CPT | Performed by: INTERNAL MEDICINE

## 2018-10-24 PROCEDURE — 3017F COLORECTAL CA SCREEN DOC REV: CPT | Performed by: INTERNAL MEDICINE

## 2018-10-24 PROCEDURE — 1036F TOBACCO NON-USER: CPT | Performed by: INTERNAL MEDICINE

## 2018-10-24 PROCEDURE — G8427 DOCREV CUR MEDS BY ELIG CLIN: HCPCS | Performed by: INTERNAL MEDICINE

## 2018-10-24 RX ORDER — SULFAMETHOXAZOLE AND TRIMETHOPRIM 800; 160 MG/1; MG/1
TABLET ORAL
Refills: 0 | COMMUNITY
Start: 2018-10-22 | End: 2019-02-27 | Stop reason: ALTCHOICE

## 2019-02-27 ENCOUNTER — OFFICE VISIT (OUTPATIENT)
Dept: FAMILY MEDICINE CLINIC | Age: 74
End: 2019-02-27
Payer: MEDICARE

## 2019-02-27 VITALS
WEIGHT: 123.6 LBS | DIASTOLIC BLOOD PRESSURE: 72 MMHG | HEART RATE: 78 BPM | BODY MASS INDEX: 24.92 KG/M2 | SYSTOLIC BLOOD PRESSURE: 120 MMHG | HEIGHT: 59 IN | TEMPERATURE: 97.6 F | RESPIRATION RATE: 16 BRPM

## 2019-02-27 DIAGNOSIS — F41.9 ANXIETY: ICD-10-CM

## 2019-02-27 DIAGNOSIS — N18.30 CHRONIC KIDNEY DISEASE, STAGE III (MODERATE) (HCC): ICD-10-CM

## 2019-02-27 DIAGNOSIS — M81.0 OSTEOPOROSIS, UNSPECIFIED OSTEOPOROSIS TYPE, UNSPECIFIED PATHOLOGICAL FRACTURE PRESENCE: ICD-10-CM

## 2019-02-27 DIAGNOSIS — I10 ESSENTIAL HYPERTENSION: Chronic | ICD-10-CM

## 2019-02-27 DIAGNOSIS — E78.5 HYPERLIPIDEMIA, UNSPECIFIED HYPERLIPIDEMIA TYPE: Primary | Chronic | ICD-10-CM

## 2019-02-27 PROCEDURE — 3017F COLORECTAL CA SCREEN DOC REV: CPT | Performed by: FAMILY MEDICINE

## 2019-02-27 PROCEDURE — G8482 FLU IMMUNIZE ORDER/ADMIN: HCPCS | Performed by: FAMILY MEDICINE

## 2019-02-27 PROCEDURE — 4040F PNEUMOC VAC/ADMIN/RCVD: CPT | Performed by: FAMILY MEDICINE

## 2019-02-27 PROCEDURE — 1101F PT FALLS ASSESS-DOCD LE1/YR: CPT | Performed by: FAMILY MEDICINE

## 2019-02-27 PROCEDURE — G8399 PT W/DXA RESULTS DOCUMENT: HCPCS | Performed by: FAMILY MEDICINE

## 2019-02-27 PROCEDURE — 1036F TOBACCO NON-USER: CPT | Performed by: FAMILY MEDICINE

## 2019-02-27 PROCEDURE — 1123F ACP DISCUSS/DSCN MKR DOCD: CPT | Performed by: FAMILY MEDICINE

## 2019-02-27 PROCEDURE — G8427 DOCREV CUR MEDS BY ELIG CLIN: HCPCS | Performed by: FAMILY MEDICINE

## 2019-02-27 PROCEDURE — G8417 CALC BMI ABV UP PARAM F/U: HCPCS | Performed by: FAMILY MEDICINE

## 2019-02-27 PROCEDURE — 99213 OFFICE O/P EST LOW 20 MIN: CPT | Performed by: FAMILY MEDICINE

## 2019-02-27 PROCEDURE — 1090F PRES/ABSN URINE INCON ASSESS: CPT | Performed by: FAMILY MEDICINE

## 2019-02-27 RX ORDER — ATORVASTATIN CALCIUM 10 MG/1
TABLET, FILM COATED ORAL
Qty: 90 TABLET | Refills: 3 | Status: SHIPPED | OUTPATIENT
Start: 2019-02-27 | End: 2020-02-19

## 2019-02-27 RX ORDER — ALPRAZOLAM 0.25 MG/1
0.25 TABLET ORAL NIGHTLY PRN
Qty: 90 TABLET | Refills: 0 | Status: SHIPPED | OUTPATIENT
Start: 2019-02-27 | End: 2020-04-16 | Stop reason: SDUPTHER

## 2019-02-27 RX ORDER — LOSARTAN POTASSIUM 25 MG/1
TABLET ORAL
Qty: 90 TABLET | Refills: 3 | Status: SHIPPED | OUTPATIENT
Start: 2019-02-27 | End: 2020-02-19

## 2019-02-27 RX ORDER — CALCITONIN SALMON 200 [IU]/.09ML
SPRAY, METERED NASAL
Qty: 3 BOTTLE | Refills: 3 | Status: SHIPPED | OUTPATIENT
Start: 2019-02-27 | End: 2020-03-16

## 2019-02-27 ASSESSMENT — ENCOUNTER SYMPTOMS
SHORTNESS OF BREATH: 0
SORE THROAT: 0
ABDOMINAL PAIN: 0
DIARRHEA: 0
VOMITING: 0
RHINORRHEA: 0
EYES NEGATIVE: 1
COUGH: 0
NAUSEA: 0
BACK PAIN: 0
CHEST TIGHTNESS: 0

## 2019-02-27 ASSESSMENT — PATIENT HEALTH QUESTIONNAIRE - PHQ9
SUM OF ALL RESPONSES TO PHQ QUESTIONS 1-9: 0
SUM OF ALL RESPONSES TO PHQ QUESTIONS 1-9: 0
SUM OF ALL RESPONSES TO PHQ9 QUESTIONS 1 & 2: 0
2. FEELING DOWN, DEPRESSED OR HOPELESS: 0
1. LITTLE INTEREST OR PLEASURE IN DOING THINGS: 0

## 2019-04-08 ENCOUNTER — OFFICE VISIT (OUTPATIENT)
Dept: FAMILY MEDICINE CLINIC | Age: 74
End: 2019-04-08
Payer: MEDICARE

## 2019-04-08 VITALS
RESPIRATION RATE: 16 BRPM | HEART RATE: 92 BPM | SYSTOLIC BLOOD PRESSURE: 108 MMHG | WEIGHT: 123.4 LBS | HEIGHT: 59 IN | DIASTOLIC BLOOD PRESSURE: 78 MMHG | TEMPERATURE: 98.2 F | BODY MASS INDEX: 24.88 KG/M2

## 2019-04-08 DIAGNOSIS — L30.9 DERMATITIS: Primary | ICD-10-CM

## 2019-04-08 PROCEDURE — G8399 PT W/DXA RESULTS DOCUMENT: HCPCS | Performed by: FAMILY MEDICINE

## 2019-04-08 PROCEDURE — 4040F PNEUMOC VAC/ADMIN/RCVD: CPT | Performed by: FAMILY MEDICINE

## 2019-04-08 PROCEDURE — G8417 CALC BMI ABV UP PARAM F/U: HCPCS | Performed by: FAMILY MEDICINE

## 2019-04-08 PROCEDURE — 1090F PRES/ABSN URINE INCON ASSESS: CPT | Performed by: FAMILY MEDICINE

## 2019-04-08 PROCEDURE — 1123F ACP DISCUSS/DSCN MKR DOCD: CPT | Performed by: FAMILY MEDICINE

## 2019-04-08 PROCEDURE — G8427 DOCREV CUR MEDS BY ELIG CLIN: HCPCS | Performed by: FAMILY MEDICINE

## 2019-04-08 PROCEDURE — 3017F COLORECTAL CA SCREEN DOC REV: CPT | Performed by: FAMILY MEDICINE

## 2019-04-08 PROCEDURE — 99213 OFFICE O/P EST LOW 20 MIN: CPT | Performed by: FAMILY MEDICINE

## 2019-04-08 PROCEDURE — 1036F TOBACCO NON-USER: CPT | Performed by: FAMILY MEDICINE

## 2019-04-08 RX ORDER — FLUOROURACIL 50 MG/G
CREAM TOPICAL EVERY EVENING
Refills: 1 | COMMUNITY
Start: 2019-03-28 | End: 2019-10-23 | Stop reason: ALTCHOICE

## 2019-04-08 RX ORDER — MOMETASONE FUROATE 1 MG/G
CREAM TOPICAL
Qty: 45 G | Refills: 2 | Status: SHIPPED | OUTPATIENT
Start: 2019-04-08 | End: 2019-10-23 | Stop reason: ALTCHOICE

## 2019-04-08 RX ORDER — IMIQUIMOD 12.5 MG/.25G
2 CREAM TOPICAL DAILY
Refills: 1 | COMMUNITY
Start: 2019-03-29 | End: 2019-10-23 | Stop reason: ALTCHOICE

## 2019-04-08 ASSESSMENT — ENCOUNTER SYMPTOMS
VOMITING: 0
DIARRHEA: 0
BACK PAIN: 0
EYES NEGATIVE: 1
NAUSEA: 0
CHEST TIGHTNESS: 0
COUGH: 0
SORE THROAT: 0
RHINORRHEA: 0
SHORTNESS OF BREATH: 0
ABDOMINAL PAIN: 0

## 2019-04-09 NOTE — PROGRESS NOTES
[address] [address]  Dept: [phone]  Dept Fax: [phone]  Loc: [phone]  PROGRESS NOTE      VisitDate: 2019    Bunny Hooker is a 69 y.o. female who presents today for:     Chief Complaint   Patient presents with    Rash     started under left shoulder blade- no spreading all over back x 2-3 weeks. itches, some discomfort. Subjective:  HPI  Patient comes in with rash that started on left shoulder blade is now spread to the entirety of her back and is working its way anteriorly. Dry itchy flaking skin. No exposures known no over-the-counter treatments attempted    Review of Systems   Constitutional: Negative for activity change, appetite change, fatigue and fever. HENT: Negative for congestion, rhinorrhea and sore throat. Eyes: Negative. Respiratory: Negative for cough, chest tightness and shortness of breath. Cardiovascular: Negative for chest pain and palpitations. Gastrointestinal: Negative for abdominal pain, diarrhea, nausea and vomiting. Genitourinary: Negative for dysuria and urgency. Musculoskeletal: Negative for arthralgias and back pain. Skin: Positive for rash. Neurological: Negative for dizziness and headaches. Psychiatric/Behavioral: Negative for dysphoric mood. The patient is not nervous/anxious.       Past Medical History:   Diagnosis Date    Angiomyolipoma     left kidney    Arthritis     hands    Blood transfusion reaction     Cancer (HCC)     left breast    Cancer of skin     Chronic kidney disease     Stage III    GERD (gastroesophageal reflux disease)     Hyperlipidemia     Hypertension     Osteopenia     Osteoporosis       Past Surgical History:   Procedure Laterality Date    APPENDECTOMY  1971    BREAST LUMPECTOMY  2009    left    BREAST SURGERY  's    left biopsy    BREAST SURGERY  2009    Lumpectomy of the Left breast     SECTION  1971 and [address] [facility] [place]  2006    COLONOSCOPY  2004    HEMORRHOID SURGERY  2004    HYSTERECTOMY  1985    SKIN BIOPSY Right 06/2013    Squemous cell cancer Right leg    SKIN BIOPSY Left 12/02/2013    Squemous cell cancer Left leg    SKIN CANCER EXCISION Left 12/2/13    Exc SCC Left Lower Leg with flap    TOTAL NEPHRECTOMY  2006    right     Family History   Problem Relation Age of Onset   [facility] Cancer Mother         melanoma, tongue    Hypertension Mother     High Cholesterol Mother     Heart Failure Father      Social History     Tobacco Use    Smoking status: Never Smoker    Smokeless tobacco: Never Used   Substance Use Topics    Alcohol use: No      Current Outpatient Medications   Medication Sig Dispense Refill    fluorouracil (EFUDEX) 5 % cream Apply topically every evening  1    imiquimod (ALDARA) 5 % cream Apply 2 packets topically daily To left lower leg  1    mometasone (ELOCON) 0.1 % cream Apply topically 2x daily. 45 g 2    losartan (COZAAR) 25 MG tablet TAKE ONE TABLET BY MOUTH ONCE DAILY 90 tablet 3    ALPRAZolam (XANAX) 0.25 MG tablet Take 1 tablet by mouth nightly as needed for Sleep or Anxiety for up to 90 days. . 90 tablet 0    atorvastatin (LIPITOR) 10 MG tablet TAKE ONE TABLET BY MOUTH ONCE DAILY 90 tablet 3    calcitonin (MIACALCIN) 200 UNIT/ACT nasal spray USE 1 SPRAY ONCE DAILY IN ONE NOSTRIL, ALTERNATING NOSTRILS DAILY 3 Bottle 3    lansoprazole (PREVACID SOLUTAB) 15 MG disintegrating tablet Take 15 mg by mouth daily      bumetanide (BUMEX) 0.5 MG tablet TAKE 1 TABLET BY MOUTH AS NEEDED 30 tablet 3    acetaminophen (TYLENOL) 500 MG tablet Take 1,000 mg by mouth as needed.  Calcium + D (CALTRATE) 600-200 MG-UNIT tablet Take 2 tablets by mouth daily.  aspirin 81 MG EC tablet Take 81 mg by mouth daily.  therapeutic multivitamin-minerals (THERAGRAN-M) tablet Take 1 tablet by mouth daily.          No current facility-administered medications for this visit. Allergies   Allergen Reactions    Cipro Xr Swelling and Rash     Health Maintenance   Topic Date Due    DTaP/Tdap/Td vaccine (1 - Tdap) 01/02/1997    Shingles Vaccine (2 of 3) 06/12/2015    Potassium monitoring  10/17/2019    Creatinine monitoring  10/17/2019    Breast cancer screen  05/09/2020    Lipid screen  02/12/2023    Colon cancer screen colonoscopy  04/24/2025    DEXA (modify frequency per FRAX score)  Completed    Flu vaccine  Completed    Pneumococcal 65+ years Vaccine  Completed    Hepatitis C screen  Completed         Objective:     Physical Exam   Constitutional: She is oriented to person, place, and time. She appears well-developed and well-nourished. HENT:   Head: Normocephalic and atraumatic. Right Ear: External ear normal.   Left Ear: External ear normal.   Nose: Nose normal.   Mouth/Throat: Oropharynx is clear and moist. No oropharyngeal exudate. Eyes: Pupils are equal, round, and reactive to light. Conjunctivae and EOM are normal. Right eye exhibits no discharge. Left eye exhibits no discharge. Neck: No thyromegaly present. No carotid bruits   Cardiovascular: Normal rate, regular rhythm, normal heart sounds and intact distal pulses. Exam reveals no gallop and no friction rub. No murmur heard. Pulmonary/Chest: Breath sounds normal. She has no wheezes. She has no rales. Abdominal: Soft. Bowel sounds are normal. She exhibits no distension and no mass. There is no tenderness. There is no rebound and no guarding. Musculoskeletal: Normal range of motion. She exhibits no edema or tenderness. Lymphadenopathy:     She has no cervical adenopathy. Neurological: She is alert and oriented to person, place, and time. She displays normal reflexes. No cranial nerve deficit. Coordination normal.   Skin: Skin is warm and dry. Rash noted. No erythema. No pallor. Dry patchy areas across her back and flanks   Psychiatric: She has a normal mood and affect.    Vitals reviewed. /78 (Site: Right Upper Arm, Position: Sitting)   Pulse 92   Temp 98.2 °F (36.8 °C) (Oral)   Resp 16   Ht 4' 10.75\" (1.492 m)   Wt 123 lb 6.4 oz (56 kg)   BMI 25.14 kg/m²       Impression/Plan:  1. Dermatitis      Requested Prescriptions     Signed Prescriptions Disp Refills    mometasone (ELOCON) 0.1 % cream 45 g 2     Sig: Apply topically 2x daily. No orders of the defined types were placed in this encounter. Patient giveneducational materials - see patient instructions. Discussed use, benefit, and side effects of prescribed medications. All patient questions answered. Pt voiced understanding. Reviewed health maintenance. Patient agreedwith treatment plan. Follow up as directed. **This report has been created using voice recognition software. It may contain minor errorswhich are inherent in voice recognition technology. **       Electronically signed by Huong Mahajan MD on 4/8/2019 at 8:17 PM

## 2019-04-15 ENCOUNTER — TELEPHONE (OUTPATIENT)
Dept: FAMILY MEDICINE CLINIC | Age: 74
End: 2019-04-15

## 2019-04-15 RX ORDER — CLOTRIMAZOLE AND BETAMETHASONE DIPROPIONATE 10; .64 MG/G; MG/G
CREAM TOPICAL
Qty: 45 G | Refills: 1 | Status: SHIPPED | OUTPATIENT
Start: 2019-04-15 | End: 2020-02-10

## 2019-04-15 NOTE — TELEPHONE ENCOUNTER
Patient was seen last week by Dr Reina Holley for a rash on her back. She is calling back in today because the medicine she was presribed is not helping. She thinks it may be spreading, and she is still having a lot of itching and discomfort. Please call her to advise.

## 2019-04-15 NOTE — TELEPHONE ENCOUNTER
Script sent and she has a dermatologist she already sees, she will call them if this does not clear up with the Lamar Wray.

## 2019-04-17 ENCOUNTER — NURSE ONLY (OUTPATIENT)
Dept: FAMILY MEDICINE CLINIC | Age: 74
End: 2019-04-17
Payer: MEDICARE

## 2019-04-17 DIAGNOSIS — N18.30 CKD (CHRONIC KIDNEY DISEASE), STAGE III (HCC): ICD-10-CM

## 2019-04-17 DIAGNOSIS — E78.5 HYPERLIPIDEMIA, UNSPECIFIED HYPERLIPIDEMIA TYPE: Chronic | ICD-10-CM

## 2019-04-17 LAB
ALBUMIN SERPL-MCNC: 4.4 G/DL (ref 3.5–5.1)
ALP BLD-CCNC: 83 U/L (ref 38–126)
ALT SERPL-CCNC: 16 U/L (ref 11–66)
ANION GAP SERPL CALCULATED.3IONS-SCNC: 15 MEQ/L (ref 8–16)
AST SERPL-CCNC: 22 U/L (ref 5–40)
BASOPHILS # BLD: 1.3 %
BASOPHILS ABSOLUTE: 0.1 THOU/MM3 (ref 0–0.1)
BILIRUB SERPL-MCNC: 0.5 MG/DL (ref 0.3–1.2)
BILIRUBIN DIRECT: < 0.2 MG/DL (ref 0–0.3)
BUN BLDV-MCNC: 23 MG/DL (ref 7–22)
CALCIUM SERPL-MCNC: 9.5 MG/DL (ref 8.5–10.5)
CHLORIDE BLD-SCNC: 104 MEQ/L (ref 98–111)
CHOLESTEROL, TOTAL: 182 MG/DL (ref 100–199)
CO2: 24 MEQ/L (ref 23–33)
CREAT SERPL-MCNC: 1.2 MG/DL (ref 0.4–1.2)
EOSINOPHIL # BLD: 5.8 %
EOSINOPHILS ABSOLUTE: 0.4 THOU/MM3 (ref 0–0.4)
ERYTHROCYTE [DISTWIDTH] IN BLOOD BY AUTOMATED COUNT: 12.4 % (ref 11.5–14.5)
ERYTHROCYTE [DISTWIDTH] IN BLOOD BY AUTOMATED COUNT: 40.7 FL (ref 35–45)
GFR SERPL CREATININE-BSD FRML MDRD: 44 ML/MIN/1.73M2
GLUCOSE BLD-MCNC: 89 MG/DL (ref 70–108)
HCT VFR BLD CALC: 38.7 % (ref 37–47)
HDLC SERPL-MCNC: 63 MG/DL
HEMOGLOBIN: 13.1 GM/DL (ref 12–16)
IMMATURE GRANS (ABS): 0.01 THOU/MM3 (ref 0–0.07)
IMMATURE GRANULOCYTES: 0.2 %
LDL CHOLESTEROL CALCULATED: 102 MG/DL
LYMPHOCYTES # BLD: 19.3 %
LYMPHOCYTES ABSOLUTE: 1.2 THOU/MM3 (ref 1–4.8)
MCH RBC QN AUTO: 30.2 PG (ref 26–33)
MCHC RBC AUTO-ENTMCNC: 33.9 GM/DL (ref 32.2–35.5)
MCV RBC AUTO: 89.2 FL (ref 81–99)
MONOCYTES # BLD: 6.4 %
MONOCYTES ABSOLUTE: 0.4 THOU/MM3 (ref 0.4–1.3)
NUCLEATED RED BLOOD CELLS: 0 /100 WBC
PLATELET # BLD: 330 THOU/MM3 (ref 130–400)
PMV BLD AUTO: 9.6 FL (ref 9.4–12.4)
POTASSIUM SERPL-SCNC: 4.8 MEQ/L (ref 3.5–5.2)
RBC # BLD: 4.34 MILL/MM3 (ref 4.2–5.4)
SEG NEUTROPHILS: 67 %
SEGMENTED NEUTROPHILS ABSOLUTE COUNT: 4.1 THOU/MM3 (ref 1.8–7.7)
SODIUM BLD-SCNC: 143 MEQ/L (ref 135–145)
TOTAL PROTEIN: 7.3 G/DL (ref 6.1–8)
TRIGL SERPL-MCNC: 84 MG/DL (ref 0–199)
WBC # BLD: 6.1 THOU/MM3 (ref 4.8–10.8)

## 2019-04-17 PROCEDURE — 36415 COLL VENOUS BLD VENIPUNCTURE: CPT | Performed by: FAMILY MEDICINE

## 2019-04-24 ENCOUNTER — OFFICE VISIT (OUTPATIENT)
Dept: NEPHROLOGY | Age: 74
End: 2019-04-24
Payer: MEDICARE

## 2019-04-24 VITALS
RESPIRATION RATE: 18 BRPM | HEART RATE: 107 BPM | HEIGHT: 58 IN | BODY MASS INDEX: 25.61 KG/M2 | SYSTOLIC BLOOD PRESSURE: 134 MMHG | DIASTOLIC BLOOD PRESSURE: 80 MMHG | WEIGHT: 122 LBS | OXYGEN SATURATION: 98 %

## 2019-04-24 DIAGNOSIS — N18.30 CKD (CHRONIC KIDNEY DISEASE), STAGE III (HCC): Primary | ICD-10-CM

## 2019-04-24 PROCEDURE — G8427 DOCREV CUR MEDS BY ELIG CLIN: HCPCS | Performed by: INTERNAL MEDICINE

## 2019-04-24 PROCEDURE — 1090F PRES/ABSN URINE INCON ASSESS: CPT | Performed by: INTERNAL MEDICINE

## 2019-04-24 PROCEDURE — G8399 PT W/DXA RESULTS DOCUMENT: HCPCS | Performed by: INTERNAL MEDICINE

## 2019-04-24 PROCEDURE — 1123F ACP DISCUSS/DSCN MKR DOCD: CPT | Performed by: INTERNAL MEDICINE

## 2019-04-24 PROCEDURE — 1036F TOBACCO NON-USER: CPT | Performed by: INTERNAL MEDICINE

## 2019-04-24 PROCEDURE — G8417 CALC BMI ABV UP PARAM F/U: HCPCS | Performed by: INTERNAL MEDICINE

## 2019-04-24 PROCEDURE — 99213 OFFICE O/P EST LOW 20 MIN: CPT | Performed by: INTERNAL MEDICINE

## 2019-04-24 PROCEDURE — 3017F COLORECTAL CA SCREEN DOC REV: CPT | Performed by: INTERNAL MEDICINE

## 2019-04-24 PROCEDURE — 4040F PNEUMOC VAC/ADMIN/RCVD: CPT | Performed by: INTERNAL MEDICINE

## 2019-04-24 NOTE — PROGRESS NOTES
[facility]    [address] [facility] [address]  [address], [address]  [phone]       Progress Note    4/24/2019 9:51 AM    Pt Name:    Mo Mendoza  YOB: 1945  Primary Care Physician:  Liz Hussein MD       Chief Complaint:   Chief Complaint   Patient presents with    Chronic Kidney Disease     iii    Hypertension        History of Chief Complaint: CKD stage IIIA from right nephrectomy done for angiolipoma.        Subjective:  I last saw the patient in clinic 10/24/18. I follow the patient for Chronic Kidney disease stage IIIA. Since our last visit the patient has not been hospitalized. The patient is sleeping well at night with 1-2 times per night nocturia. The patient has a good appetite and is remaining active. The patient denied N/V/C/D/SOB/CP. Last six eGFR readings:  Lab Results   Component Value Date    LABGLOM 44 04/17/2019    LABGLOM 49 04/18/2018    LABGLOM 37 08/11/2014    LABGLOM 41 05/09/2014    LABGLOM 35 05/08/2014          Objective:  VITALS:  /80 (Site: Right Upper Arm, Position: Sitting, Cuff Size: Small Adult)   Pulse 107   Resp 18   Ht 4' 10\" (1.473 m)   Wt 122 lb (55.3 kg)   SpO2 98%   BMI 25.50 kg/m²   Weight:   Wt Readings from Last 3 Encounters:   04/24/19 122 lb (55.3 kg)   04/08/19 123 lb 6.4 oz (56 kg)   02/27/19 123 lb 9.6 oz (56.1 kg)     Body mass index is 25.5 kg/m². Physical examination    General:  Alert and cooperative with exam  HEENT:  Normocephalic. No lesions nor rashes. VINCENT. EOMI  Neck:   No JVD and no bruits. Thyroid gland is normal  Lungs:  Breathing easily. No rales nor rhonchi. No cough nor sputum production. Heart[de-identified]            RRR. No murmurs nor rubs. PMI is not enlarged nor displaced. Abdomen:  Soft and non tender. Bowel sounds are active in all four quadrants. Extremities:  Trivial ankle swelling (from Chemo cream)  Neurologic:  CN II-XII are intact. No deficits noted.  Muscle strength and tone are equal throughout. Skin:                Warm and dry with no rashes. Muscles:         Hand  and leg strength are equal and strong bilaterally.      Lab Data      CBC:   Lab Results   Component Value Date    WBC 6.1 04/17/2019    HGB 13.1 04/17/2019    HCT 38.7 04/17/2019    MCV 89.2 04/17/2019     04/17/2019     BMP:    Lab Results   Component Value Date     04/17/2019     10/17/2018     04/18/2018    K 4.8 04/17/2019    K 5.4 10/17/2018    K 4.8 04/18/2018     04/17/2019     10/17/2018     04/18/2018    CO2 24 04/17/2019    CO2 25 10/17/2018    CO2 26 04/18/2018    BUN 23 (H) 04/17/2019    BUN 17 10/17/2018    BUN 18 04/18/2018    CREATININE 1.2 04/17/2019    CREATININE 1.3 10/17/2018    CREATININE 1.1 04/18/2018    GLUCOSE 89 04/17/2019    GLUCOSE 95 10/17/2018    GLUCOSE 93 04/18/2018      Hepatic:   Lab Results   Component Value Date    AST 22 04/17/2019    AST 23 02/12/2018    AST 27 12/14/2016    ALT 16 04/17/2019    ALT 21 02/12/2018    ALT 25 12/14/2016    BILITOT 0.5 04/17/2019    BILITOT 0.7 02/12/2018    BILITOT 0.5 12/14/2016    ALKPHOS 83 04/17/2019    ALKPHOS 78 02/12/2018    ALKPHOS 78 12/14/2016     BNP: No results found for: BNP  Lipids:   Lab Results   Component Value Date    CHOL 182 04/17/2019    HDL 63 04/17/2019     INR: No results found for: INR  URINE: No results found for: NAUR, PROTUR  Lab Results   Component Value Date    NITRU Negative 09/03/2015    COLORU YELLOW 05/08/2014    PHUR 6.5 09/03/2015    RBCUA 0 04/16/2014    YEAST 0 04/16/2014    BACTERIA 0 04/16/2014    CLARITYU Clear 04/16/2014    LEUKOCYTESUR neg 09/03/2015    LEUKOCYTESUR NEGATIVE 05/08/2014    UROBILINOGEN 0.2 05/08/2014    BILIRUBINUR NEGATIVE 05/08/2014    BLOODU Negative 09/03/2015    GLUCOSEU neg 09/03/2015    KETUA Negative 09/03/2015      Microalbumen/Creatinine ratio:  No components found for: RUCREAT        Medications:    Current Outpatient Medications   Medication Sig Dispense Refill    clotrimazole-betamethasone (LOTRISONE) 1-0.05 % cream Apply topically 2 times daily. 45 g 1    fluorouracil (EFUDEX) 5 % cream Apply topically every evening  1    imiquimod (ALDARA) 5 % cream Apply 2 packets topically daily To left lower leg  1    mometasone (ELOCON) 0.1 % cream Apply topically 2x daily. 45 g 2    losartan (COZAAR) 25 MG tablet TAKE ONE TABLET BY MOUTH ONCE DAILY 90 tablet 3    ALPRAZolam (XANAX) 0.25 MG tablet Take 1 tablet by mouth nightly as needed for Sleep or Anxiety for up to 90 days. . 90 tablet 0    atorvastatin (LIPITOR) 10 MG tablet TAKE ONE TABLET BY MOUTH ONCE DAILY 90 tablet 3    calcitonin (MIACALCIN) 200 UNIT/ACT nasal spray USE 1 SPRAY ONCE DAILY IN ONE NOSTRIL, ALTERNATING NOSTRILS DAILY 3 Bottle 3    lansoprazole (PREVACID SOLUTAB) 15 MG disintegrating tablet Take 15 mg by mouth daily      bumetanide (BUMEX) 0.5 MG tablet TAKE 1 TABLET BY MOUTH AS NEEDED 30 tablet 3    acetaminophen (TYLENOL) 500 MG tablet Take 1,000 mg by mouth as needed.  Calcium + D (CALTRATE) 600-200 MG-UNIT tablet Take 2 tablets by mouth daily.  aspirin 81 MG EC tablet Take 81 mg by mouth daily.  therapeutic multivitamin-minerals (THERAGRAN-M) tablet Take 1 tablet by mouth daily. No current facility-administered medications for this visit. IMPRESSIONS:      Kidney disease: CKD stage IIIA-stable  Anemia: stable  Bone and mineral metabolism: stable       PLAN:  1. We discussed the eGFR today. 2. We will continue all current medications without changes. 3. We will see the patient back in 6 months.               _________________________________  Teodora Shook.  Flex Ramirez DO  [facility]      Monroe Mccain MD

## 2019-10-17 LAB
ABSOLUTE BASO #: 0.1 X10E9/L (ref 0–0.9)
ABSOLUTE EOS #: 0.3 X10E9/L (ref 0–0.4)
ABSOLUTE LYMPH #: 1.5 X10E9/L (ref 1–3.5)
ABSOLUTE MONO #: 0.3 X10E9/L (ref 0–0.9)
ABSOLUTE NEUT #: 2.8 X10E9/L (ref 1.5–6.6)
ANION GAP SERPL CALCULATED.3IONS-SCNC: 11 MMOL/L (ref 4–12)
BASOPHILS RELATIVE PERCENT: 1.4 %
BUN BLDV-MCNC: 16 MG/DL (ref 5–27)
CALCIUM SERPL-MCNC: 10 MG/DL (ref 8.5–10.5)
CHLORIDE BLD-SCNC: 104 MMOL/L (ref 98–109)
CO2: 28 MMOL/L (ref 22–32)
CREAT SERPL-MCNC: 1.25 MG/DL (ref 0.4–1)
EGFR AFRICAN AMERICAN: 51 ML/MIN/1.73SQ.M
EGFR IF NONAFRICAN AMERICAN: 42 ML/MIN/1.73SQ.M
EOSINOPHILS RELATIVE PERCENT: 6.7 %
GLUCOSE: 96 MG/DL (ref 65–99)
HCT VFR BLD CALC: 37.7 % (ref 35–47)
HEMOGLOBIN: 13 G/DL (ref 11.7–16.1)
LYMPHOCYTE %: 29.4 %
MCH RBC QN AUTO: 30.6 PG (ref 27–35)
MCHC RBC AUTO-ENTMCNC: 34.6 G/DL (ref 31–36)
MCV RBC AUTO: 88 FL (ref 81–101)
MONOCYTES # BLD: 6.5 %
NEUTROPHILS RELATIVE PERCENT: 56 %
PDW BLD-RTO: 12.8 % (ref 11.5–14.7)
PLATELETS: 324 X10E9/L (ref 150–450)
PMV BLD AUTO: 7.5 FL (ref 7–12)
POTASSIUM SERPL-SCNC: 5.8 MMOL/L (ref 3.5–5)
RBC: 4.26 X10E12/L (ref 3.55–5.2)
SODIUM BLD-SCNC: 143 MMOL/L (ref 134–146)
WBC: 5 X10E9/L (ref 4–11.8)

## 2019-10-23 ENCOUNTER — OFFICE VISIT (OUTPATIENT)
Dept: NEPHROLOGY | Age: 74
End: 2019-10-23
Payer: MEDICARE

## 2019-10-23 VITALS
HEART RATE: 88 BPM | SYSTOLIC BLOOD PRESSURE: 125 MMHG | RESPIRATION RATE: 18 BRPM | HEIGHT: 59 IN | BODY MASS INDEX: 24.6 KG/M2 | DIASTOLIC BLOOD PRESSURE: 80 MMHG | OXYGEN SATURATION: 96 % | WEIGHT: 122 LBS

## 2019-10-23 DIAGNOSIS — N18.30 CKD (CHRONIC KIDNEY DISEASE), STAGE III (HCC): Primary | ICD-10-CM

## 2019-10-23 DIAGNOSIS — E61.1 IRON DEFICIENCY: ICD-10-CM

## 2019-10-23 PROCEDURE — 3017F COLORECTAL CA SCREEN DOC REV: CPT | Performed by: INTERNAL MEDICINE

## 2019-10-23 PROCEDURE — 1036F TOBACCO NON-USER: CPT | Performed by: INTERNAL MEDICINE

## 2019-10-23 PROCEDURE — G8427 DOCREV CUR MEDS BY ELIG CLIN: HCPCS | Performed by: INTERNAL MEDICINE

## 2019-10-23 PROCEDURE — 1123F ACP DISCUSS/DSCN MKR DOCD: CPT | Performed by: INTERNAL MEDICINE

## 2019-10-23 PROCEDURE — G8417 CALC BMI ABV UP PARAM F/U: HCPCS | Performed by: INTERNAL MEDICINE

## 2019-10-23 PROCEDURE — 4040F PNEUMOC VAC/ADMIN/RCVD: CPT | Performed by: INTERNAL MEDICINE

## 2019-10-23 PROCEDURE — G8482 FLU IMMUNIZE ORDER/ADMIN: HCPCS | Performed by: INTERNAL MEDICINE

## 2019-10-23 PROCEDURE — 1090F PRES/ABSN URINE INCON ASSESS: CPT | Performed by: INTERNAL MEDICINE

## 2019-10-23 PROCEDURE — 99213 OFFICE O/P EST LOW 20 MIN: CPT | Performed by: INTERNAL MEDICINE

## 2019-10-23 PROCEDURE — G8399 PT W/DXA RESULTS DOCUMENT: HCPCS | Performed by: INTERNAL MEDICINE

## 2019-10-23 RX ORDER — TRIAMCINOLONE ACETONIDE 1 MG/G
CREAM TOPICAL
Refills: 1 | COMMUNITY
Start: 2019-08-19 | End: 2019-10-23 | Stop reason: ALTCHOICE

## 2019-11-14 LAB
IRON SATURATION: 24 % SATURATION (ref 15–50)
IRON, SERUM: 80 UG/DL (ref 50–170)
IRON: NORMAL
TOTAL IRON BINDING CAPACITY: 332 UG/DL (ref 250–425)

## 2019-11-20 ENCOUNTER — OFFICE VISIT (OUTPATIENT)
Dept: NEPHROLOGY | Age: 74
End: 2019-11-20
Payer: MEDICARE

## 2019-11-20 VITALS
OXYGEN SATURATION: 95 % | HEIGHT: 57 IN | SYSTOLIC BLOOD PRESSURE: 113 MMHG | BODY MASS INDEX: 25.67 KG/M2 | DIASTOLIC BLOOD PRESSURE: 77 MMHG | RESPIRATION RATE: 18 BRPM | HEART RATE: 91 BPM | WEIGHT: 119 LBS

## 2019-11-20 DIAGNOSIS — N18.30 CKD (CHRONIC KIDNEY DISEASE), STAGE III (HCC): Primary | ICD-10-CM

## 2019-11-20 PROCEDURE — 3017F COLORECTAL CA SCREEN DOC REV: CPT | Performed by: INTERNAL MEDICINE

## 2019-11-20 PROCEDURE — G8417 CALC BMI ABV UP PARAM F/U: HCPCS | Performed by: INTERNAL MEDICINE

## 2019-11-20 PROCEDURE — G8427 DOCREV CUR MEDS BY ELIG CLIN: HCPCS | Performed by: INTERNAL MEDICINE

## 2019-11-20 PROCEDURE — 4040F PNEUMOC VAC/ADMIN/RCVD: CPT | Performed by: INTERNAL MEDICINE

## 2019-11-20 PROCEDURE — 1090F PRES/ABSN URINE INCON ASSESS: CPT | Performed by: INTERNAL MEDICINE

## 2019-11-20 PROCEDURE — G8399 PT W/DXA RESULTS DOCUMENT: HCPCS | Performed by: INTERNAL MEDICINE

## 2019-11-20 PROCEDURE — 99213 OFFICE O/P EST LOW 20 MIN: CPT | Performed by: INTERNAL MEDICINE

## 2019-11-20 PROCEDURE — G8482 FLU IMMUNIZE ORDER/ADMIN: HCPCS | Performed by: INTERNAL MEDICINE

## 2019-11-20 PROCEDURE — 1036F TOBACCO NON-USER: CPT | Performed by: INTERNAL MEDICINE

## 2019-11-20 PROCEDURE — 1123F ACP DISCUSS/DSCN MKR DOCD: CPT | Performed by: INTERNAL MEDICINE

## 2019-11-20 RX ORDER — CETIRIZINE HYDROCHLORIDE 5 MG/1
2.5 TABLET ORAL PRN
COMMUNITY
End: 2020-08-13 | Stop reason: ALTCHOICE

## 2020-02-10 ENCOUNTER — HOSPITAL ENCOUNTER (EMERGENCY)
Age: 75
Discharge: HOME | End: 2020-02-10
Payer: MEDICARE

## 2020-02-10 ENCOUNTER — APPOINTMENT (OUTPATIENT)
Dept: GENERAL RADIOLOGY | Age: 75
End: 2020-02-10
Payer: MEDICARE

## 2020-02-10 VITALS
OXYGEN SATURATION: 97 % | WEIGHT: 120 LBS | BODY MASS INDEX: 25.97 KG/M2 | RESPIRATION RATE: 16 BRPM | HEART RATE: 99 BPM | DIASTOLIC BLOOD PRESSURE: 79 MMHG | TEMPERATURE: 98.1 F | SYSTOLIC BLOOD PRESSURE: 139 MMHG

## 2020-02-10 PROCEDURE — 99213 OFFICE O/P EST LOW 20 MIN: CPT

## 2020-02-10 PROCEDURE — 72100 X-RAY EXAM L-S SPINE 2/3 VWS: CPT

## 2020-02-10 RX ORDER — LIDOCAINE 50 MG/G
OINTMENT TOPICAL
Qty: 1 TUBE | Refills: 0 | Status: SHIPPED | OUTPATIENT
Start: 2020-02-10 | End: 2021-04-08 | Stop reason: ALTCHOICE

## 2020-02-10 ASSESSMENT — ENCOUNTER SYMPTOMS
VOMITING: 0
BACK PAIN: 1
COUGH: 0
SORE THROAT: 0
NAUSEA: 0
SHORTNESS OF BREATH: 0

## 2020-02-10 ASSESSMENT — PAIN DESCRIPTION - PAIN TYPE: TYPE: ACUTE PAIN

## 2020-02-10 ASSESSMENT — PAIN SCALES - GENERAL: PAINLEVEL_OUTOF10: 5

## 2020-02-10 ASSESSMENT — PAIN DESCRIPTION - LOCATION: LOCATION: BACK

## 2020-02-10 NOTE — ED NOTES
Pt. Released in stable condition, ambulated per self to private car. Instructed pt to follow-up with family doctor as needed for recheck or go directly to the emergency department for any concerns/worsening conditions. Pt. Verbalized understanding of instructions. No questions at this time. RX in hand.       Maine Evangelista RN  02/10/20 [id number]

## 2020-02-10 NOTE — ED TRIAGE NOTES
Pt walked to room 7. Pt here with complaints of low back pain. Pt slipped on the ice Saturday morning at her home. Pt was getting the mail and on lower back.  Happened around 9:30 am.

## 2020-02-10 NOTE — ED PROVIDER NOTES
[facility]  Urgent Care Encounter       CHIEF COMPLAINT       Chief Complaint   Patient presents with    Back Pain     Pt slipped on ice around 9:390 am Saturday. Landed on lower back. Nurses Notes reviewed and I agree except as noted in the HPI. HISTORY OF PRESENT ILLNESS   Pete Dasilva is a 69 y.o. female who presents for evaluation of the low back pain that is been ongoing for the past 2 days. Patient states that the pain began when she slipped on some ice and fell directly onto her back. States that she did initially have some swelling but this has resolved. She denies any numbness or tingling into her lower extremities or any loss of bowel or bladder control. States that she has been taking Tylenol at home as she is unable to take NSAIDs and this does seem to be helping. The history is provided by the patient. REVIEW OF SYSTEMS     Review of Systems   Constitutional: Negative for chills and fever. HENT: Negative for congestion and sore throat. Respiratory: Negative for cough and shortness of breath. Cardiovascular: Negative for chest pain. Gastrointestinal: Negative for nausea and vomiting. Genitourinary: Negative for difficulty urinating. Musculoskeletal: Positive for back pain. Negative for arthralgias and myalgias. Skin: Negative for rash. Allergic/Immunologic: Negative for environmental allergies. Neurological: Negative for weakness and numbness. PAST MEDICAL HISTORY         Diagnosis Date    Angiomyolipoma     left kidney    Arthritis     hands    Blood transfusion reaction     Cancer (HCC)     left breast    Cancer of skin     Chronic kidney disease     Stage III    GERD (gastroesophageal reflux disease)     Hyperlipidemia     Hypertension     Osteopenia     Osteoporosis        SURGICALHISTORY     Patient  has a past surgical history that includes Appendectomy ();  section ( and );  Hysterectomy (1985); Hemorrhoid surgery (2004); total nephrectomy (2006); Cholecystectomy (2006); Breast surgery (70's); Breast surgery (2009); Colonoscopy (2004); Breast lumpectomy (2009); Skin cancer excision (Left, 12/2/13); skin biopsy (Right, 06/2013); and skin biopsy (Left, 12/02/2013). CURRENT MEDICATIONS       Previous Medications    ACETAMINOPHEN (TYLENOL) 500 MG TABLET    Take 1,000 mg by mouth as needed. ASPIRIN 81 MG EC TABLET    Take 81 mg by mouth daily. ATORVASTATIN (LIPITOR) 10 MG TABLET    TAKE ONE TABLET BY MOUTH ONCE DAILY    CALCITONIN (MIACALCIN) 200 UNIT/ACT NASAL SPRAY    USE 1 SPRAY ONCE DAILY IN ONE NOSTRIL, ALTERNATING NOSTRILS DAILY    CALCIUM + D (CALTRATE) 600-200 MG-UNIT TABLET    Take 2 tablets by mouth daily. CETIRIZINE (ZYRTEC) 5 MG TABLET    Take 2.5 mg by mouth as needed for Allergies    DEXTROMETHORPHAN POLISTIREX (ROBITUSSIN 12 HOUR COUGH PO)    Take by mouth    LANSOPRAZOLE (PREVACID SOLUTAB) 15 MG DISINTEGRATING TABLET    Take 15 mg by mouth daily    LOSARTAN (COZAAR) 25 MG TABLET    TAKE ONE TABLET BY MOUTH ONCE DAILY    THERAPEUTIC MULTIVITAMIN-MINERALS (THERAGRAN-M) TABLET    Take 1 tablet by mouth daily. ALLERGIES     Patient is is allergic to cipro xr. Patients   Immunization History   Administered Date(s) Administered    Influenza 10/11/2013    Influenza Vaccine, unspecified formulation 10/01/2016, 10/11/2017    Influenza Virus Vaccine 10/01/2014    Influenza, High Dose (Fluzone 65 yrs and older) 11/03/2015, 10/02/2018, 09/23/2019    Pneumococcal Conjugate 13-valent ([id number]) 12/13/2013    Pneumococcal Polysaccharide ([id number]) 11/01/2015    Td, unspecified formulation 01/01/1997    Zoster Live (Zostavax) 04/17/2015       FAMILY HISTORY     Patient's family history includes Cancer in her mother; Heart Failure in her father; High Cholesterol in her mother; Hypertension in her mother.     SOCIAL HISTORY     Patient  reports that she has never 2/10/2020 12:28 PM            EKG:  none    URGENT CARE COURSE:     Vitals:    02/10/20 1201   BP: 139/79   Pulse: 99   Resp: 16   Temp: 98.1 °F (36.7 °C)   TempSrc: Temporal   SpO2: 97%   Weight: 120 lb (54.4 kg)       Medications - No data to display         PROCEDURES:  None    FINAL IMPRESSION      1. Contusion of lower back, initial encounter          DISPOSITION/ PLAN     X-rays negative for any acute fracture or dislocation. I did mention to the patient the degenerative findings as mentioned by the radiologist, however she is advised to continue Tylenol at home and will be given a prescription for lidocaine ointment for pain management. She is advised to follow-up with her PCP on an outpatient basis regarding the degenerative x-ray findings or if she has any further issues or concerns. She is instructed to present directly to the ER for any loss of sensation in the lower extremities or loss of bowel/bladder control and she is agreeable to plan as discussed. PATIENT REFERRED TO:  Janett Lima MD  [address] [address]      DISCHARGE MEDICATIONS:  New Prescriptions    LIDOCAINE (XYLOCAINE) 5 % OINTMENT    Apply topically as needed. Discontinued Medications    BUMETANIDE (BUMEX) 0.5 MG TABLET    TAKE 1 TABLET BY MOUTH AS NEEDED    CLOTRIMAZOLE-BETAMETHASONE (LOTRISONE) 1-0.05 % CREAM    Apply topically 2 times daily.        Current Discharge Medication List          ÓSCAR Farnsworth CNP    (Please note that portions of this note were completed with a voice recognition program. Efforts were made to edit the dictations but occasionally words are mis-transcribed.)          ÓSCAR Farnsworth CNP  02/10/20 7002

## 2020-02-12 ENCOUNTER — TELEPHONE (OUTPATIENT)
Dept: FAMILY MEDICINE CLINIC | Age: 75
End: 2020-02-12

## 2020-02-19 RX ORDER — ATORVASTATIN CALCIUM 10 MG/1
TABLET, FILM COATED ORAL
Qty: 90 TABLET | Refills: 0 | Status: SHIPPED | OUTPATIENT
Start: 2020-02-19 | End: 2020-04-16 | Stop reason: SDUPTHER

## 2020-02-19 RX ORDER — LOSARTAN POTASSIUM 25 MG/1
TABLET ORAL
Qty: 90 TABLET | Refills: 0 | Status: SHIPPED | OUTPATIENT
Start: 2020-02-19 | End: 2020-04-16 | Stop reason: SDUPTHER

## 2020-03-16 RX ORDER — CALCITONIN SALMON 200 [IU]/.09ML
SPRAY, METERED NASAL
Qty: 11.1 ML | Refills: 3 | Status: SHIPPED | OUTPATIENT
Start: 2020-03-16 | End: 2021-04-06

## 2020-03-18 ENCOUNTER — TELEPHONE (OUTPATIENT)
Dept: NEPHROLOGY | Age: 75
End: 2020-03-18

## 2020-03-18 LAB
ABSOLUTE BASO #: 0.1 X10E9/L (ref 0–0.9)
ABSOLUTE EOS #: 0.4 X10E9/L (ref 0–0.4)
ABSOLUTE LYMPH #: 1.5 X10E9/L (ref 1–3.5)
ABSOLUTE MONO #: 0.5 X10E9/L (ref 0–0.9)
ABSOLUTE NEUT #: 3.4 X10E9/L (ref 1.5–6.6)
ANION GAP SERPL CALCULATED.3IONS-SCNC: 8 MMOL/L (ref 5–15)
BASOPHILS RELATIVE PERCENT: 1.4 %
BUN BLDV-MCNC: 19 MG/DL (ref 5–27)
CALCIUM SERPL-MCNC: 9.9 MG/DL (ref 8.5–10.5)
CHLORIDE BLD-SCNC: 104 MMOL/L (ref 98–109)
CO2: 30 MMOL/L (ref 22–32)
CREAT SERPL-MCNC: 1.33 MG/DL (ref 0.4–1)
EGFR AFRICAN AMERICAN: 47 ML/MIN/1.73SQ.M
EGFR IF NONAFRICAN AMERICAN: 39 ML/MIN/1.73SQ.M
EOSINOPHILS RELATIVE PERCENT: 7.5 %
GLUCOSE: 92 MG/DL (ref 65–99)
HCT VFR BLD CALC: 38.4 % (ref 35–47)
HEMOGLOBIN: 12.8 G/DL (ref 11.7–16.1)
LYMPHOCYTE %: 25.7 %
MCH RBC QN AUTO: 29 PG (ref 27–35)
MCHC RBC AUTO-ENTMCNC: 33.3 G/DL (ref 31–36)
MCV RBC AUTO: 87 FL (ref 81–101)
MONOCYTES # BLD: 8.2 %
NEUTROPHILS RELATIVE PERCENT: 57.2 %
PDW BLD-RTO: 13.4 % (ref 11.5–14.7)
PLATELETS: 344 X10E9/L (ref 150–450)
PMV BLD AUTO: 7.5 FL (ref 7–12)
POTASSIUM SERPL-SCNC: 5 MMOL/L (ref 3.5–5)
RBC: 4.4 X10E12/L (ref 3.55–5.2)
SODIUM BLD-SCNC: 142 MMOL/L (ref 134–146)
WBC: 5.9 X10E9/L (ref 4–11.8)

## 2020-03-18 NOTE — TELEPHONE ENCOUNTER
Patient called and rescheduled her appointment for March to the end of May-she just had her labs done today

## 2020-04-16 ENCOUNTER — VIRTUAL VISIT (OUTPATIENT)
Dept: FAMILY MEDICINE CLINIC | Age: 75
End: 2020-04-16
Payer: MEDICARE

## 2020-04-16 PROCEDURE — 99441 PR PHYS/QHP TELEPHONE EVALUATION 5-10 MIN: CPT | Performed by: FAMILY MEDICINE

## 2020-04-16 RX ORDER — ATORVASTATIN CALCIUM 10 MG/1
TABLET, FILM COATED ORAL
Qty: 90 TABLET | Refills: 3 | Status: SHIPPED | OUTPATIENT
Start: 2020-04-16 | End: 2021-03-31 | Stop reason: SDUPTHER

## 2020-04-16 RX ORDER — LOSARTAN POTASSIUM 25 MG/1
TABLET ORAL
Qty: 90 TABLET | Refills: 3 | Status: SHIPPED | OUTPATIENT
Start: 2020-04-16 | End: 2021-03-30

## 2020-04-16 RX ORDER — ALPRAZOLAM 0.25 MG/1
0.25 TABLET ORAL NIGHTLY PRN
Qty: 90 TABLET | Refills: 0 | Status: SHIPPED | OUTPATIENT
Start: 2020-04-16 | End: 2021-04-08 | Stop reason: SDUPTHER

## 2020-05-11 ENCOUNTER — HOSPITAL ENCOUNTER (OUTPATIENT)
Dept: WOMENS IMAGING | Age: 75
Discharge: HOME | End: 2020-05-11
Payer: MEDICARE

## 2020-05-11 PROCEDURE — 77080 DXA BONE DENSITY AXIAL: CPT

## 2020-05-12 ENCOUNTER — TELEPHONE (OUTPATIENT)
Dept: FAMILY MEDICINE CLINIC | Age: 75
End: 2020-05-12

## 2020-05-13 LAB
ABSOLUTE BASO #: 0 X10E9/L (ref 0–0.9)
ABSOLUTE EOS #: 0.3 X10E9/L (ref 0–0.4)
ABSOLUTE LYMPH #: 1.3 X10E9/L (ref 1–3.5)
ABSOLUTE MONO #: 0.3 X10E9/L (ref 0–0.9)
ABSOLUTE NEUT #: 2.7 X10E9/L (ref 1.5–6.6)
ALBUMIN SERPL-MCNC: 4.3 G/DL (ref 3.2–5.3)
ALK PHOSPHATASE: 71 U/L (ref 39–130)
ALT SERPL-CCNC: 31 U/L (ref 0–31)
ANION GAP SERPL CALCULATED.3IONS-SCNC: 10 MMOL/L (ref 5–15)
AST SERPL-CCNC: 27 U/L (ref 0–41)
BASOPHILS RELATIVE PERCENT: 1 %
BILIRUB SERPL-MCNC: 0.5 MG/DL (ref 0.3–1.2)
BILIRUBIN DIRECT: 0.1 MG/DL (ref 0–0.4)
BUN BLDV-MCNC: 17 MG/DL (ref 5–27)
CALCIUM SERPL-MCNC: 9.8 MG/DL (ref 8.5–10.5)
CHLORIDE BLD-SCNC: 104 MMOL/L (ref 98–109)
CHOLESTEROL/HDL RATIO: 2.7 (ref 1–5)
CHOLESTEROL: 171 MG/DL (ref 150–200)
CO2: 28 MMOL/L (ref 22–32)
CREAT SERPL-MCNC: 1.2 MG/DL (ref 0.4–1)
EGFR AFRICAN AMERICAN: 53 ML/MIN/1.73SQ.M
EGFR IF NONAFRICAN AMERICAN: 44 ML/MIN/1.73SQ.M
EOSINOPHILS RELATIVE PERCENT: 6.7 %
GLUCOSE: 99 MG/DL (ref 65–99)
HCT VFR BLD CALC: 36.7 % (ref 35–47)
HDLC SERPL-MCNC: 64 MG/DL
HEMOGLOBIN: 12.4 G/DL (ref 11.7–16.1)
LDL CHOLESTEROL CALCULATED: 87 MG/DL
LDL/HDL RATIO: 1.4
LYMPHOCYTE %: 27.2 %
MCH RBC QN AUTO: 29.5 PG (ref 27–35)
MCHC RBC AUTO-ENTMCNC: 33.7 G/DL (ref 31–36)
MCV RBC AUTO: 88 FL (ref 81–101)
MONOCYTES # BLD: 7.1 %
NEUTROPHILS RELATIVE PERCENT: 58 %
PDW BLD-RTO: 13.1 % (ref 11.5–14.7)
PLATELETS: 314 X10E9/L (ref 150–450)
PMV BLD AUTO: 7.2 FL (ref 7–12)
POTASSIUM SERPL-SCNC: 5.3 MMOL/L (ref 3.5–5)
RBC: 4.19 X10E12/L (ref 3.55–5.2)
SODIUM BLD-SCNC: 142 MMOL/L (ref 134–146)
TOTAL PROTEIN: 6.8 G/DL (ref 6–8)
TRIGL SERPL-MCNC: 99 MG/DL (ref 27–150)
VLDLC SERPL CALC-MCNC: 20 MG/DL (ref 0–30)
WBC: 4.7 X10E9/L (ref 4–11.8)

## 2020-05-19 ENCOUNTER — OFFICE VISIT (OUTPATIENT)
Dept: NEPHROLOGY | Age: 75
End: 2020-05-19
Payer: MEDICARE

## 2020-05-19 VITALS
HEART RATE: 110 BPM | TEMPERATURE: 97.3 F | WEIGHT: 123.4 LBS | OXYGEN SATURATION: 97 % | SYSTOLIC BLOOD PRESSURE: 119 MMHG | DIASTOLIC BLOOD PRESSURE: 74 MMHG | BODY MASS INDEX: 26.7 KG/M2

## 2020-05-19 PROCEDURE — 1036F TOBACCO NON-USER: CPT | Performed by: INTERNAL MEDICINE

## 2020-05-19 PROCEDURE — 3017F COLORECTAL CA SCREEN DOC REV: CPT | Performed by: INTERNAL MEDICINE

## 2020-05-19 PROCEDURE — 1123F ACP DISCUSS/DSCN MKR DOCD: CPT | Performed by: INTERNAL MEDICINE

## 2020-05-19 PROCEDURE — G8399 PT W/DXA RESULTS DOCUMENT: HCPCS | Performed by: INTERNAL MEDICINE

## 2020-05-19 PROCEDURE — G8427 DOCREV CUR MEDS BY ELIG CLIN: HCPCS | Performed by: INTERNAL MEDICINE

## 2020-05-19 PROCEDURE — 99213 OFFICE O/P EST LOW 20 MIN: CPT | Performed by: INTERNAL MEDICINE

## 2020-05-19 PROCEDURE — G8417 CALC BMI ABV UP PARAM F/U: HCPCS | Performed by: INTERNAL MEDICINE

## 2020-05-19 PROCEDURE — 4040F PNEUMOC VAC/ADMIN/RCVD: CPT | Performed by: INTERNAL MEDICINE

## 2020-05-19 PROCEDURE — 1090F PRES/ABSN URINE INCON ASSESS: CPT | Performed by: INTERNAL MEDICINE

## 2020-05-19 NOTE — PROGRESS NOTES
Patient states nothing has changed with medications, didn't bring list.
with no rashes. Muscles:         Hand  and leg strength are equal and strong bilaterally.      Lab Data      CBC:   Lab Results   Component Value Date    WBC 4.7 05/13/2020    HGB 12.4 05/13/2020    HCT 36.7 05/13/2020    MCV 88 05/13/2020     05/13/2020     BMP:    Lab Results   Component Value Date     05/13/2020     03/18/2020     10/16/2019    K 5.3 (H) 05/13/2020    K 5.0 03/18/2020    K 5.8 (H) 10/16/2019     05/13/2020     03/18/2020     10/16/2019    CO2 28 05/13/2020    CO2 30 03/18/2020    CO2 28 10/16/2019    BUN 17 05/13/2020    BUN 19 03/18/2020    BUN 16 10/16/2019    CREATININE 1.20 (H) 05/13/2020    CREATININE 1.33 (H) 03/18/2020    CREATININE 1.25 (H) 10/16/2019    GLUCOSE 99 05/13/2020    GLUCOSE 92 03/18/2020    GLUCOSE 96 10/16/2019      Hepatic:   Lab Results   Component Value Date    AST 27 05/13/2020    AST 22 04/17/2019    AST 23 02/12/2018    ALT 31 05/13/2020    ALT 16 04/17/2019    ALT 21 02/12/2018    BILITOT 0.5 05/13/2020    BILITOT 0.5 04/17/2019    BILITOT 0.7 02/12/2018    ALKPHOS 71 05/13/2020    ALKPHOS 83 04/17/2019    ALKPHOS 78 02/12/2018     BNP: No results found for: BNP  Lipids:   Lab Results   Component Value Date    CHOL 171 05/13/2020    HDL 64 05/13/2020     INR: No results found for: INR  URINE: No results found for: NAUR, PROTUR  Lab Results   Component Value Date    NITRU Negative 09/03/2015    COLORU YELLOW 05/08/2014    PHUR 6.5 09/03/2015    RBCUA 0 04/16/2014    YEAST 0 04/16/2014    BACTERIA 0 04/16/2014    CLARITYU Clear 04/16/2014    LEUKOCYTESUR neg 09/03/2015    LEUKOCYTESUR NEGATIVE 05/08/2014    UROBILINOGEN 0.2 05/08/2014    BILIRUBINUR NEGATIVE 05/08/2014    BLOODU Negative 09/03/2015    GLUCOSEU neg 09/03/2015    KETUA Negative 09/03/2015      Microalbumen/Creatinine ratio:  No components found for: RUCREAT        Medications:    Current Outpatient Medications   Medication Sig Dispense Refill    atorvastatin

## 2020-05-19 NOTE — PATIENT INSTRUCTIONS
KNOW YOUR KIDNEY NUMBERS    Your kidney speed (eGFR) was 44 ml/min this visit (normal is  ml/min)(Ml/min=milliliters of blood filtered per minute). The higher this number is, the better your kidney function is. Your serum creatinine was 1.2 (normal 0.8-1.2 mg/dl at most labs). The higher this number is, the worse your kidney function is. You are in stage G-IIIA-A1 of chronic kidney disease. Your kidney function has improved as compared to your last visit. Stages of kidney disease    EGFR (estimated glomerular filtration rate)    G-I > 90 ml/min  G-II 60-89 ml/min  G-IIIA 45-59 ml/min  G-IIIB 30-44 ml/min  G-IV 15-29 ml/min  G-V < 15 mlmin    ( may need dialysis)    ACR (urine albumin/creatinine ratio)    A-1       ACR<3  A-2 ACR 3-30  A-3 ACR >30    Our goal is to keep your eGFR going as fast as possible ( ml/min is normal). If your eGFR declines to 15-24 ml/min and stays there without recovery,  we will begin to educate you about dialysis or kidney transplant. The leading cause of kidney disease in the world is diabetes mellitus. Keep your sugar  as much as possible. The second leading cause is hypertension. Keep Your blood pressure 120-140/70-80 as much as possible. If you need refills, call the office or your drug store. You may call the office any time with any questions or concerns. DUE TO THE CORONAVIRUS CONCERN, PLEASE LIMIT YOUR TIME IN PUBLIC. [address] YOUR HANDS COMPLETELY AND FREQUENTLY. Naty Vargas

## 2020-06-19 ENCOUNTER — TELEPHONE (OUTPATIENT)
Dept: FAMILY MEDICINE CLINIC | Age: 75
End: 2020-06-19

## 2020-06-22 ENCOUNTER — HOSPITAL ENCOUNTER (OUTPATIENT)
Age: 75
Discharge: HOME | End: 2020-06-22
Payer: MEDICARE

## 2020-06-22 ENCOUNTER — HOSPITAL ENCOUNTER (OUTPATIENT)
Dept: GENERAL RADIOLOGY | Age: 75
Discharge: HOME | End: 2020-06-22
Payer: MEDICARE

## 2020-06-22 PROCEDURE — 72110 X-RAY EXAM L-2 SPINE 4/>VWS: CPT

## 2020-06-22 PROCEDURE — 73502 X-RAY EXAM HIP UNI 2-3 VIEWS: CPT

## 2020-07-15 ENCOUNTER — OFFICE VISIT (OUTPATIENT)
Dept: FAMILY MEDICINE CLINIC | Age: 75
End: 2020-07-15
Payer: MEDICARE

## 2020-07-15 VITALS
TEMPERATURE: 97.3 F | WEIGHT: 123 LBS | SYSTOLIC BLOOD PRESSURE: 128 MMHG | DIASTOLIC BLOOD PRESSURE: 78 MMHG | RESPIRATION RATE: 14 BRPM | BODY MASS INDEX: 26.62 KG/M2 | HEART RATE: 89 BPM | OXYGEN SATURATION: 96 %

## 2020-07-15 PROCEDURE — G8417 CALC BMI ABV UP PARAM F/U: HCPCS | Performed by: FAMILY MEDICINE

## 2020-07-15 PROCEDURE — 4040F PNEUMOC VAC/ADMIN/RCVD: CPT | Performed by: FAMILY MEDICINE

## 2020-07-15 PROCEDURE — G8427 DOCREV CUR MEDS BY ELIG CLIN: HCPCS | Performed by: FAMILY MEDICINE

## 2020-07-15 PROCEDURE — 3017F COLORECTAL CA SCREEN DOC REV: CPT | Performed by: FAMILY MEDICINE

## 2020-07-15 PROCEDURE — 1036F TOBACCO NON-USER: CPT | Performed by: FAMILY MEDICINE

## 2020-07-15 PROCEDURE — 1123F ACP DISCUSS/DSCN MKR DOCD: CPT | Performed by: FAMILY MEDICINE

## 2020-07-15 PROCEDURE — 99213 OFFICE O/P EST LOW 20 MIN: CPT | Performed by: FAMILY MEDICINE

## 2020-07-15 PROCEDURE — G8399 PT W/DXA RESULTS DOCUMENT: HCPCS | Performed by: FAMILY MEDICINE

## 2020-07-15 PROCEDURE — 1090F PRES/ABSN URINE INCON ASSESS: CPT | Performed by: FAMILY MEDICINE

## 2020-07-15 ASSESSMENT — PATIENT HEALTH QUESTIONNAIRE - PHQ9
2. FEELING DOWN, DEPRESSED OR HOPELESS: 0
1. LITTLE INTEREST OR PLEASURE IN DOING THINGS: 0
SUM OF ALL RESPONSES TO PHQ QUESTIONS 1-9: 0
SUM OF ALL RESPONSES TO PHQ9 QUESTIONS 1 & 2: 0
SUM OF ALL RESPONSES TO PHQ QUESTIONS 1-9: 0

## 2020-07-16 ASSESSMENT — ENCOUNTER SYMPTOMS
BACK PAIN: 1
CHEST TIGHTNESS: 0
COUGH: 0
NAUSEA: 0
VOMITING: 0
ABDOMINAL PAIN: 0
SHORTNESS OF BREATH: 0
RHINORRHEA: 0
SORE THROAT: 0
DIARRHEA: 0
EYES NEGATIVE: 1

## 2020-07-16 NOTE — PROGRESS NOTES
[address] [address]  Dept: [phone]  Dept Fax: [phone]  Loc: [phone]  PROGRESS NOTE      VisitDate: 7/15/2020    Luan Colbert is a 70 y.o. female who presents today for:     Chief Complaint   Patient presents with    Results     discuss xray of hip and spine         Subjective:  HPI  Follow-up back pain still having pain in the lower back radiating around to the front also having pain medially limiting the left lower extremity. Been receiving some chiropractic care and her sciatic symptoms have improved slightly. X-ray results reviewed with the patient    Review of Systems   Constitutional: Negative for activity change, appetite change, fatigue and fever. HENT: Negative for congestion, rhinorrhea and sore throat. Eyes: Negative. Respiratory: Negative for cough, chest tightness and shortness of breath. Cardiovascular: Negative for chest pain and palpitations. Gastrointestinal: Negative for abdominal pain, diarrhea, nausea and vomiting. Genitourinary: Negative for dysuria and urgency. Musculoskeletal: Positive for back pain. Negative for arthralgias. Neurological: Negative for dizziness and headaches. Psychiatric/Behavioral: Negative for dysphoric mood. The patient is not nervous/anxious.       Past Medical History:   Diagnosis Date    Angiomyolipoma     left kidney    Arthritis     hands    Blood transfusion reaction     Cancer (HCC)     left breast    Cancer of skin     Chronic kidney disease     Stage III    GERD (gastroesophageal reflux disease)     Hyperlipidemia     Hypertension     Osteopenia     Osteoporosis       Past Surgical History:   Procedure Laterality Date    APPENDECTOMY  1971    BREAST LUMPECTOMY  2009    left    BREAST SURGERY  70's    left biopsy    BREAST SURGERY  2009    Lumpectomy of the Left breast   [address] and [address]    (AWV)  05/29/2019    Flu vaccine (1) 09/01/2020    Lipid screen  05/13/2021    Potassium monitoring  05/13/2021    Creatinine monitoring  05/13/2021    Breast cancer screen  05/12/2022    Colon cancer screen colonoscopy  04/24/2025    DEXA (modify frequency per FRAX score)  Completed    Pneumococcal 65+ years Vaccine  Completed    Hepatitis C screen  Completed    Hepatitis A vaccine  Aged Out    Hepatitis B vaccine  Aged Out    Hib vaccine  Aged Out    Meningococcal (ACWY) vaccine  Aged Out         Objective:     Physical Exam  Constitutional:       General: She is not in acute distress. Appearance: She is well-developed. She is not diaphoretic. HENT:      Head: Normocephalic and atraumatic. Eyes:      General: No scleral icterus. Conjunctiva/sclera: Conjunctivae normal.   Neck:      Thyroid: No thyromegaly. Vascular: No JVD. Comments: No bruits  Cardiovascular:      Rate and Rhythm: Normal rate and regular rhythm. Heart sounds: Normal heart sounds. Pulmonary:      Effort: Pulmonary effort is normal. No respiratory distress. Breath sounds: Normal breath sounds. No wheezing or rales. Abdominal:      Palpations: Abdomen is soft. There is no mass. Tenderness: There is no abdominal tenderness. There is no guarding. Musculoskeletal:         General: Tenderness present. Skin:     General: Skin is warm and dry. Findings: No rash. Neurological:      Mental Status: She is alert and oriented to person, place, and time. Cranial Nerves: No cranial nerve deficit. /78   Pulse 89   Temp 97.3 °F (36.3 °C) (Temporal)   Resp 14   Wt 123 lb (55.8 kg)   SpO2 96%   BMI 26.62 kg/m²       Impression/Plan:  1. Sacroiliitis ([address]    2.  Lumbar radiculopathy      Requested Prescriptions      No prescriptions requested or ordered in this encounter     Orders Placed This Encounter   Procedures    External Referral To Physical Therapy     Referral Priority: Routine     Referral Type:   Eval and Treat     Requested Specialty:   Physical Therapy     Number of Visits Requested:   1       Patient giveneducational materials - see patient instructions. Discussed use, benefit, and side effects of prescribed medications. All patient questions answered. Pt voiced understanding. Reviewed health maintenance. Patient agreedwith treatment plan. Follow up as directed. **This report has been created using voice recognition software. It may contain minor errorswhich are inherent in voice recognition technology. **       Electronically signed by Pamela Betts MD on 7/16/2020 at 8:46 AM

## 2020-08-13 ENCOUNTER — TELEPHONE (OUTPATIENT)
Dept: FAMILY MEDICINE CLINIC | Age: 75
End: 2020-08-13

## 2020-08-13 ENCOUNTER — OFFICE VISIT (OUTPATIENT)
Dept: FAMILY MEDICINE CLINIC | Age: 75
End: 2020-08-13
Payer: MEDICARE

## 2020-08-13 VITALS
DIASTOLIC BLOOD PRESSURE: 78 MMHG | WEIGHT: 119 LBS | RESPIRATION RATE: 16 BRPM | HEART RATE: 84 BPM | SYSTOLIC BLOOD PRESSURE: 128 MMHG | TEMPERATURE: 98.8 F | BODY MASS INDEX: 25.75 KG/M2

## 2020-08-13 PROCEDURE — 1090F PRES/ABSN URINE INCON ASSESS: CPT | Performed by: FAMILY MEDICINE

## 2020-08-13 PROCEDURE — 1036F TOBACCO NON-USER: CPT | Performed by: FAMILY MEDICINE

## 2020-08-13 PROCEDURE — G8399 PT W/DXA RESULTS DOCUMENT: HCPCS | Performed by: FAMILY MEDICINE

## 2020-08-13 PROCEDURE — 99213 OFFICE O/P EST LOW 20 MIN: CPT | Performed by: FAMILY MEDICINE

## 2020-08-13 PROCEDURE — G8417 CALC BMI ABV UP PARAM F/U: HCPCS | Performed by: FAMILY MEDICINE

## 2020-08-13 PROCEDURE — 1123F ACP DISCUSS/DSCN MKR DOCD: CPT | Performed by: FAMILY MEDICINE

## 2020-08-13 PROCEDURE — G8427 DOCREV CUR MEDS BY ELIG CLIN: HCPCS | Performed by: FAMILY MEDICINE

## 2020-08-13 PROCEDURE — 3017F COLORECTAL CA SCREEN DOC REV: CPT | Performed by: FAMILY MEDICINE

## 2020-08-13 PROCEDURE — 4040F PNEUMOC VAC/ADMIN/RCVD: CPT | Performed by: FAMILY MEDICINE

## 2020-08-13 NOTE — TELEPHONE ENCOUNTER
ordered Ct abd/pelvis without contrast. Additional contrast per Radiologist recommendations. I spoke with Dr. Alvarez Longoria at [facility] main radiology to see if oral contrast was recommended for LLQ pain, hx of right nephrectomy. He states oral contrast is not needed. Jessie at [facility] scheduled pt at [facility] for 8/14/20 at 10:30. She needs to arrive at 10:00. No prep. Pt was informed and states she had labs done today at [facility]. Order was faxed to [facility].

## 2020-08-14 ENCOUNTER — HOSPITAL ENCOUNTER (OUTPATIENT)
Dept: CT IMAGING | Age: 75
Discharge: HOME | End: 2020-08-14
Payer: MEDICARE

## 2020-08-14 LAB
APPEARANCE: CLEAR
BILIRUBIN: NEGATIVE
COLOR: YELLOW
GLUCOSE BLD-MCNC: NEGATIVE MG/DL
HCT VFR BLD CALC: 38.6 % (ref 35–47)
HEMOGLOBIN: 13 G/DL (ref 11.7–15.5)
KETONES, URINE: NEGATIVE MG/DL
LEUKOCYTE ESTERASE, URINE: NEGATIVE
LYMPHOCYTES # BLD: 10 %
LYMPHOCYTES ABSOLUTE: 1.3 X10E9/L (ref 1–3.5)
MCH RBC QN AUTO: 29.7 PG (ref 27–34)
MCHC RBC AUTO-ENTMCNC: 33.6 G/DL (ref 32–36)
MCV RBC AUTO: 89 FL (ref 80–100)
MONOCYTES # BLD: 5 %
MONOCYTES ABSOLUTE: 0.6 X10E9/L (ref 0–0.9)
NEUTROPHILS ABSOLUTE: 10.6 X10E9/L (ref 1.5–6.6)
NEUTROPHILS RELATIVE PERCENT: 85 %
NITRITE, URINE: NEGATIVE
OCCULT BLOOD,URINE: NEGATIVE
OTHER MICROSCOPIC ELEMENTS: ABNORMAL
PDW BLD-RTO: 12.9 % (ref 11.5–15)
PH: 6.5 (ref 5–8.5)
PLATELETS: 385 X10E9/L (ref 150–450)
PMV BLD AUTO: 7.9 FL (ref 7–12)
PROTEIN, URINE: ABNORMAL MG/DL
RBC # BLD: ABNORMAL 10*6/UL
RBC: 1 /HPF (ref 0–5)
RBC: 4.36 X10E12/L (ref 3.8–5.2)
SITE/TYPE: ABNORMAL
SP GRAVITY MISCELLANEOUS: 1.02 (ref 1–1.03)
UROBILINOGEN, URINE: <1.1 EU/DL
WBC: 12.5 X10E9/L (ref 4–11)
WBC: <1 /HPF (ref 0–5)

## 2020-08-14 PROCEDURE — 74176 CT ABD & PELVIS W/O CONTRAST: CPT

## 2020-08-14 ASSESSMENT — ENCOUNTER SYMPTOMS
SORE THROAT: 0
RHINORRHEA: 0
NAUSEA: 0
VOMITING: 0
SHORTNESS OF BREATH: 0
COUGH: 0
DIARRHEA: 0
BACK PAIN: 0
EYES NEGATIVE: 1
ABDOMINAL PAIN: 1
CHEST TIGHTNESS: 0

## 2020-08-14 NOTE — PROGRESS NOTES
[address] [place] [facility] [address]  Dept: [phone]  Dept Fax: [phone]  Loc: [phone]  PROGRESS NOTE      VisitDate: 8/13/2020    Sofiya Gomez is a 70 y.o. female who presents today for:     Chief Complaint   Patient presents with    Hernia     severe left groin pain, radiates to mid abdomen, painful when coughing or sneezing, doesn't feel a bump, tylenol helps         Subjective:  HPI  Patient comes in after being referred from her physical therapist for concern about a hernia. She has had pain in the left groin going down into the leg and now has noted that she is having tenderness up in her abdomen. No significant change in her bowel habits which have never really been regular. Denies fever chills no blood in the stool or urine. There is no colicky nature to her pain it is tender to touch she gets some relief by changing positions    Review of Systems   Constitutional: Negative for activity change, appetite change, fatigue and fever. HENT: Negative for congestion, rhinorrhea and sore throat. Eyes: Negative. Respiratory: Negative for cough, chest tightness and shortness of breath. Cardiovascular: Negative for chest pain and palpitations. Gastrointestinal: Positive for abdominal pain. Negative for diarrhea, nausea and vomiting. Genitourinary: Negative for dysuria and urgency. Musculoskeletal: Negative for arthralgias and back pain. Neurological: Negative for dizziness and headaches. Psychiatric/Behavioral: Negative for dysphoric mood. The patient is not nervous/anxious.       Past Medical History:   Diagnosis Date    Angiomyolipoma     left kidney    Arthritis     hands    Blood transfusion reaction     Cancer (HCC)     left breast    Cancer of skin     Chronic kidney disease     Stage III    GERD (gastroesophageal reflux disease)     Hyperlipidemia     Hypertension     Osteopenia     Osteoporosis       Past Surgical History:   Procedure Laterality Date    APPENDECTOMY  1971    BREAST LUMPECTOMY  2009    left    BREAST SURGERY  70's    left biopsy    BREAST SURGERY  2009    Lumpectomy of the Left breast     SECTION  1971 and [address] [facility]  2006    COLONOSCOPY  2004    HEMORRHOID SURGERY  2004    HYSTERECTOMY  1985    SKIN BIOPSY Right 2013    Squemous cell cancer Right leg    SKIN BIOPSY Left 2013    Squemous cell cancer Left leg    SKIN CANCER EXCISION Left 13    Exc SCC Left Lower Leg with flap    TOTAL NEPHRECTOMY  2006    right     Family History   Problem Relation Age of Onset    Cancer Mother         melanoma, tongue    Hypertension Mother     High Cholesterol Mother     Heart Failure Father      Social History     Tobacco Use    Smoking status: Never Smoker    Smokeless tobacco: Never Used   Substance Use Topics    Alcohol use: No      Current Outpatient Medications   Medication Sig Dispense Refill    atorvastatin (LIPITOR) 10 MG tablet TAKE 1 TABLET BY MOUTH EVERY DAY 90 tablet 3    losartan (COZAAR) 25 MG tablet TAKE 1 TABLET BY MOUTH EVERY DAY 90 tablet 3    calcitonin (MIACALCIN) 200 UNIT/ACT nasal spray USE 1 SPRAY IN ONE NOSTRIL ONCE A DAY, ALTERNATING NOSTRILS DAILY 11.1 mL 3    lidocaine (XYLOCAINE) 5 % ointment Apply topically as needed. 1 Tube 0    Dextromethorphan Polistirex (ROBITUSSIN 12 HOUR COUGH PO) Take by mouth      lansoprazole (PREVACID SOLUTAB) 15 MG disintegrating tablet Take 15 mg by mouth daily      acetaminophen (TYLENOL) 500 MG tablet Take 1,000 mg by mouth as needed.  Calcium + D (CALTRATE) 600-200 MG-UNIT tablet Take 2 tablets by mouth daily.  aspirin 81 MG EC tablet Take 81 mg by mouth daily.  therapeutic multivitamin-minerals (THERAGRAN-M) tablet Take 1 tablet by mouth daily. No current facility-administered medications for this visit.       Allergies   Allergen Reactions    Cipro Xr Swelling and Rash     Health Maintenance   Topic Date Due    DTaP/Tdap/Td vaccine (1 - Tdap) 03/20/1966    Shingles Vaccine (2 of 3) 06/12/2015    Annual Wellness Visit (AWV)  05/29/2019    Flu vaccine (1) 09/01/2020    Lipid screen  05/13/2021    Potassium monitoring  05/13/2021    Creatinine monitoring  05/13/2021    Breast cancer screen  05/12/2022    Colon cancer screen colonoscopy  04/24/2025    DEXA (modify frequency per FRAX score)  Completed    Pneumococcal 65+ years Vaccine  Completed    Hepatitis C screen  Completed    Hepatitis A vaccine  Aged Out    Hepatitis B vaccine  Aged Out    Hib vaccine  Aged Out    Meningococcal (ACWY) vaccine  Aged Out         Objective:     Physical Exam  Constitutional:       General: She is not in acute distress. Appearance: She is well-developed. She is not diaphoretic. HENT:      Head: Normocephalic and atraumatic. Eyes:      General: No scleral icterus. Conjunctiva/sclera: Conjunctivae normal.   Neck:      Thyroid: No thyromegaly. Vascular: No JVD. Comments: No bruits  Cardiovascular:      Rate and Rhythm: Normal rate and regular rhythm. Heart sounds: Normal heart sounds. Pulmonary:      Effort: Pulmonary effort is normal. No respiratory distress. Breath sounds: Normal breath sounds. No wheezing or rales. Abdominal:      Palpations: Abdomen is soft. There is no mass. Tenderness: There is abdominal tenderness. There is no guarding. Comments: Tenderness in the left lower quadrant, with Valsalva there is a very small questionable inguinal type hernia without strangulation or incarceration but difficult to palpate repeatedly, unclear if it is significant but given the severity of her symptoms is unlikely the source of her pain   Musculoskeletal:         General: No tenderness. Skin:     General: Skin is warm and dry. Findings: No rash.    Neurological:      Mental Status: She is alert and oriented to person, place, and time. Cranial Nerves: No cranial nerve deficit. /78 (Site: Right Upper Arm)   Pulse 84   Temp 98.8 °F (37.1 °C) (Temporal)   Resp 16   Wt 119 lb (54 kg)   BMI 25.75 kg/m²       Impression/Plan:  1. LLQ pain      Requested Prescriptions      No prescriptions requested or ordered in this encounter     Orders Placed This Encounter   Procedures    CT ABDOMEN PELVIS WO CONTRAST Additional Contrast? Radiologist Recommendation     Standing Status:   Future     Standing Expiration Date:   8/13/2021     Order Specific Question:   Additional Contrast?     Answer:   Radiologist Recommendation    Urinalysis with Reflex Culture     Standing Status:   Future     Standing Expiration Date:   8/13/2021    CBC Auto Differential     Standing Status:   Future     Standing Expiration Date:   8/13/2021    CBC    Urine with Reflexed Micro       Patient giveneducational materials - see patient instructions. Discussed use, benefit, and side effects of prescribed medications. All patient questions answered. Pt voiced understanding. Reviewed health maintenance. Patient agreedwith treatment plan. Follow up as directed. **This report has been created using voice recognition software. It may contain minor errorswhich are inherent in voice recognition technology. **       Electronically signed by Lili Stewart MD on 8/14/2020 at 7:52 AM

## 2020-08-17 ENCOUNTER — TELEPHONE (OUTPATIENT)
Dept: FAMILY MEDICINE CLINIC | Age: 75
End: 2020-08-17

## 2020-08-17 RX ORDER — AMOXICILLIN AND CLAVULANATE POTASSIUM 875; 125 MG/1; MG/1
1 TABLET, FILM COATED ORAL 2 TIMES DAILY
Qty: 20 TABLET | Refills: 0 | Status: SHIPPED | OUTPATIENT
Start: 2020-08-17 | End: 2020-08-27 | Stop reason: ALTCHOICE

## 2020-08-17 NOTE — TELEPHONE ENCOUNTER
Per Dr Stephie Croft: Diverticulits, rec augmentin 875 mg bid x 10 days. Pt informed. Rx e-scribed. Patient says you mentioned a hernia at her visit. Does she need referred for that or just monitor?

## 2020-08-27 ENCOUNTER — OFFICE VISIT (OUTPATIENT)
Dept: FAMILY MEDICINE CLINIC | Age: 75
End: 2020-08-27
Payer: MEDICARE

## 2020-08-27 VITALS
DIASTOLIC BLOOD PRESSURE: 78 MMHG | HEART RATE: 84 BPM | HEIGHT: 58 IN | RESPIRATION RATE: 16 BRPM | SYSTOLIC BLOOD PRESSURE: 118 MMHG | WEIGHT: 117.8 LBS | TEMPERATURE: 97 F | BODY MASS INDEX: 24.73 KG/M2

## 2020-08-27 PROCEDURE — G0439 PPPS, SUBSEQ VISIT: HCPCS | Performed by: FAMILY MEDICINE

## 2020-08-27 PROCEDURE — 4040F PNEUMOC VAC/ADMIN/RCVD: CPT | Performed by: FAMILY MEDICINE

## 2020-08-27 PROCEDURE — 1123F ACP DISCUSS/DSCN MKR DOCD: CPT | Performed by: FAMILY MEDICINE

## 2020-08-27 PROCEDURE — 3017F COLORECTAL CA SCREEN DOC REV: CPT | Performed by: FAMILY MEDICINE

## 2020-08-27 RX ORDER — FLUCONAZOLE 150 MG/1
150 TABLET ORAL ONCE
Qty: 1 TABLET | Refills: 0 | Status: SHIPPED | OUTPATIENT
Start: 2020-08-27 | End: 2020-08-27

## 2020-08-27 ASSESSMENT — PATIENT HEALTH QUESTIONNAIRE - PHQ9
SUM OF ALL RESPONSES TO PHQ QUESTIONS 1-9: 0
SUM OF ALL RESPONSES TO PHQ QUESTIONS 1-9: 0

## 2020-08-27 ASSESSMENT — LIFESTYLE VARIABLES: HOW OFTEN DO YOU HAVE A DRINK CONTAINING ALCOHOL: 0

## 2020-08-29 NOTE — PATIENT INSTRUCTIONS
Personalized Preventive Plan for [employer] - 8/27/2020  Medicare offers a range of preventive health benefits. Some of the tests and screenings are paid in full while other may be subject to a deductible, co-insurance, and/or copay. Some of these benefits include a comprehensive review of your medical history including lifestyle, illnesses that may run in your family, and various assessments and screenings as appropriate. After reviewing your medical record and screening and assessments performed today your provider may have ordered immunizations, labs, imaging, and/or referrals for you. A list of these orders (if applicable) as well as your Preventive Care list are included within your After Visit Summary for your review. Other Preventive Recommendations:    · A preventive eye exam performed by an eye specialist is recommended every 1-2 years to screen for glaucoma; cataracts, macular degeneration, and other eye disorders. · A preventive dental visit is recommended every 6 months. · Try to get at least 150 minutes of exercise per week or 10,000 steps per day on a pedometer . · Order or download the FREE \"Exercise & Physical Activity: Your Everyday Guide\" from [employer]. Call 9-912.127.9503 or search [employer] online. · You need 9996-1201 mg of calcium and 2147-1613 IU of vitamin D per day. It is possible to meet your calcium requirement with diet alone, but a vitamin D supplement is usually necessary to meet this goal.  · When exposed to the sun, use a sunscreen that protects against both UVA and UVB radiation with an SPF of 30 or greater. Reapply every 2 to 3 hours or after sweating, drying off with a towel, or swimming. · Always wear a seat belt when traveling in a car. Always wear a helmet when riding a bicycle or motorcycle.

## 2020-08-29 NOTE — PROGRESS NOTES
Medicare Annual Wellness Visit  Name: Arnulfo Rosas Date: 2020   MRN: [id number] Sex: Female   Age: 70 y.o. Ethnicity: Non-/Non    : 1945 Race: Yola Jj is here for Hip Injury (PT made her stop until seen by PCP due to increase pain with exercises) and Yeast Infection (after antibiotics for diverticulitis)    Screenings for behavioral, psychosocial and functional/safety risks, and cognitive dysfunction are all negative except as indicated below. These results, as well as other patient data from the [facility] form, are documented in Flowsheets linked to this Encounter. Allergies   Allergen Reactions    Cipro Xr Swelling and Rash       Prior to Visit Medications    Medication Sig Taking? Authorizing Provider   atorvastatin (LIPITOR) 10 MG tablet TAKE 1 TABLET BY MOUTH EVERY DAY Yes Preston Karu MD   losartan (COZAAR) 25 MG tablet TAKE 1 TABLET BY MOUTH EVERY DAY Yes Preston Kaur MD   calcitonin (MIACALCIN) 200 UNIT/ACT nasal spray USE 1 SPRAY IN ONE NOSTRIL ONCE A DAY, ALTERNATING NOSTRILS DAILY Yes Preston Kaur MD   lidocaine (XYLOCAINE) 5 % ointment Apply topically as needed. Yes Kassidy Alvarez APRN - CAROLINE   Dextromethorphan Polistirex (ROBITUSSIN 12 HOUR COUGH PO) Take by mouth Yes Historical Provider, MD   lansoprazole (PREVACID SOLUTAB) 15 MG disintegrating tablet Take 15 mg by mouth daily Yes Historical Provider, MD   acetaminophen (TYLENOL) 500 MG tablet Take 1,000 mg by mouth as needed. Yes Historical Provider, MD   Calcium + D (CALTRATE) 600-200 MG-UNIT tablet Take 2 tablets by mouth daily. Yes Historical Provider, MD   aspirin 81 MG EC tablet Take 81 mg by mouth daily. Yes Historical Provider, MD   therapeutic multivitamin-minerals (THERAGRAN-M) tablet Take 1 tablet by mouth daily.    Yes Historical Provider, MD       Past Medical History:   Diagnosis Date    Angiomyolipoma     left kidney    Arthritis     hands    Blood transfusion reaction     Cancer ([facility] [address]     left breast    Cancer of skin     Chronic kidney disease     Stage III    GERD (gastroesophageal reflux disease)     Hyperlipidemia     Hypertension     Osteopenia     Osteoporosis        Past Surgical History:   Procedure Laterality Date    APPENDECTOMY  1971    BREAST LUMPECTOMY  2009    left    BREAST SURGERY  70's    left biopsy    BREAST SURGERY  2009    Lumpectomy of the Left breast     SECTION  1971 and [address] [facility]  2006    COLONOSCOPY  2004    HEMORRHOID SURGERY  2004    HYSTERECTOMY  1985    SKIN BIOPSY Right 2013    Squemous cell cancer Right leg    SKIN BIOPSY Left 2013    Squemous cell cancer Left leg    SKIN CANCER EXCISION Left 13    Exc SCC Left Lower Leg with flap    TOTAL NEPHRECTOMY  2006    right       Family History   Problem Relation Age of Onset   Jeniffer Ramsey Cancer Mother         melanoma, tongue    Hypertension Mother     High Cholesterol Mother     Heart Failure Father        CareTeam (Including outside providers/suppliers regularly involved in providing care):   Patient Care Team:  Zuleima Ramos MD as PCP - General (Family Medicine)  Zuleima Ramos MD as PCP - [facility] Empaneled Provider    Wt Readings from Last 3 Encounters:   20 117 lb 12.8 oz (53.4 kg)   20 119 lb (54 kg)   07/15/20 123 lb (55.8 kg)     Vitals:    20 1453   BP: 118/78   Site: Right Upper Arm   Pulse: 84   Resp: 16   Temp: 97 °F (36.1 °C)   TempSrc: Temporal   Weight: 117 lb 12.8 oz (53.4 kg)   Height: 4' 10\" (1.473 m)     Body mass index is 24.62 kg/m². Based upon direct observation of the patient, evaluation of cognition reveals recent and remote memory intact.     General Appearance: alert and oriented to person, place and time, well developed and well- nourished, in no acute distress  Skin: warm and dry, no rash or erythema  Head: normocephalic and atraumatic  Eyes: pupils equal, round, and reactive to light, extraocular eye movements intact, conjunctivae normal  ENT: tympanic membrane, external ear and ear canal normal bilaterally, nose without deformity, nasal mucosa and turbinates normal without polyps  Neck: supple and non-tender without mass, no thyromegaly or thyroid nodules, no cervical lymphadenopathy  Pulmonary/Chest: clear to auscultation bilaterally- no wheezes, rales or rhonchi, normal air movement, no respiratory distress  Cardiovascular: normal rate, regular rhythm, normal S1 and S2, no murmurs, rubs, clicks, or gallops, distal pulses intact, no carotid bruits  Abdomen: soft, non-tender, non-distended, normal bowel sounds, no masses or organomegaly  Extremities: no cyanosis, clubbing or edema  Musculoskeletal: normal range of motion, no joint swelling, deformity or tenderness  Neurologic: reflexes normal and symmetric, no cranial nerve deficit, gait, coordination and speech normal    Patient's complete Health Risk Assessment and screening values have been reviewed and are found in Flowsheets. The following problems were reviewed today and where indicated follow up appointments were made and/or referrals ordered. Positive Risk Factor Screenings with Interventions:     Fall Risk:  Timed Up and Go Test > 12 seconds? (Complete if either Fall Risk answers are Yes): (!) yes  2 or more falls in past year?: no  Fall with injury in past year?: (!) yes  Fall Risk Interventions:    · Home safety tips provided    General Health:  General  In general, how would you say your health is?: Good  In the past 7 days, have you experienced any of the following?  New or Increased Pain, New or Increased Fatigue, Loneliness, Social Isolation, Stress or Anger?: (!) New or Increased Pain  Do you get the social and emotional support that you need?: Yes  Do you have a Living Will?: Yes  General Health Risk Interventions:  · Stress: patient declines any further evaluation/treatment for this issue    Health Habits/Nutrition:  Health Habits/Nutrition  Do you exercise for at least 20 minutes 2-3 times per week?: (!) No  Have you lost any weight without trying in the past 3 months?: No  Do you eat fewer than 2 meals per day?: No  Have you seen a dentist within the past year?: Yes  Body mass index is 24.62 kg/m².   Health Habits/Nutrition Interventions:  · Inadequate physical activity:  patient is not ready to increase his/her physical activity level at this time    Hearing/Vision:  No exam data present  Hearing/Vision  Do you or your family notice any trouble with your hearing?: (!) Yes  Do you have difficulty driving, watching TV, or doing any of your daily activities because of your eyesight?: No  Have you had an eye exam within the past year?: Yes  Hearing/Vision Interventions:  · Hearing concerns:  patient declines any further evaluation/treatment for hearing issues    Safety:  Safety  Do you have working smoke detectors?: Yes  Have all throw rugs been removed or fastened?: (!) No  Do you have non-slip mats or surfaces in all bathtubs/showers?: (!) No  Do all of your stairways have a railing or banister?: Yes  Are your doorways, halls and stairs free of clutter?: Yes  Do you always fasten your seatbelt when you are in a car?: Yes  Safety Interventions:  · Home safety tips provided    Personalized Preventive Plan   Current Health Maintenance Status  Immunization History   Administered Date(s) Administered    Influenza 10/11/2013    Influenza Vaccine, unspecified formulation 10/01/2016, 10/11/2017    Influenza Virus Vaccine 10/01/2014    Influenza, High Dose (Fluzone 65 yrs and older) 11/03/2015, 10/02/2018, 09/23/2019    Pneumococcal Conjugate 13-valent ([id number]) 12/13/2013    Pneumococcal Polysaccharide ([id number]) 11/01/2015    Td, unspecified formulation 01/01/1997    Zoster Live (Zostavax) 04/17/2015        Health Maintenance   Topic Date Due    DTaP/Tdap/Td vaccine (1 - Tdap) 03/20/1966    Shingles Vaccine (2 of 3) 06/12/2015  Annual Wellness Visit (AWV)  05/29/2019    Flu vaccine (1) 09/01/2020    Lipid screen  05/13/2021    Potassium monitoring  05/13/2021    Creatinine monitoring  05/13/2021    Breast cancer screen  05/12/2022    Colon cancer screen colonoscopy  04/24/2025    DEXA (modify frequency per FRAX score)  Completed    Pneumococcal 65+ years Vaccine  Completed    Hepatitis C screen  Completed    Hepatitis A vaccine  Aged Out    Hepatitis B vaccine  Aged Out    Hib vaccine  Aged Out    Meningococcal (ACWY) vaccine  Aged Out     Recommendations for IPICO Due: see orders and patient instructions/AVS.  . Recommended screening schedule for the next 5-10 years is provided to the patient in written form: see Patient Instructions/AVS.    Jimy Edmondson was seen today for hip injury and yeast infection. Diagnoses and all orders for this visit:    Routine general medical examination at a health care facility    Lumbar radiculopathy  -     MRI LUMBAR SPINE [address]; Future    Malignant neoplasm of upper-outer quadrant of left breast in female, estrogen receptor positive ([facility] [address]    Other orders  -     fluconazole (DIFLUCAN) 150 MG tablet; Take 1 tablet by mouth once for 1 dose                Seen today for wellness visit. Discussed the importance of a healthy life style. Balanced diet, nutrition, physical activity,and injury prevention. Also discussed the importance of up to date immunizations and annual screenings.

## 2020-09-10 ENCOUNTER — HOSPITAL ENCOUNTER (OUTPATIENT)
Dept: MRI IMAGING | Age: 75
Discharge: HOME | End: 2020-09-10
Payer: MEDICARE

## 2020-09-10 PROCEDURE — 72148 MRI LUMBAR SPINE W/O DYE: CPT

## 2020-09-11 ENCOUNTER — TELEPHONE (OUTPATIENT)
Dept: FAMILY MEDICINE CLINIC | Age: 75
End: 2020-09-11

## 2020-09-11 NOTE — TELEPHONE ENCOUNTER
----- Message from Iraida Cervantes MD sent at 9/11/2020 11:40 AM EDT -----  MRI shows moderate foraminal and spinal canal stenosis related to degenerative changes.   Symptoms are not improving recommend appointment with spine specialist of her choice

## 2020-11-04 LAB
ABSOLUTE BASO #: 0.1 X10E9/L (ref 0–0.2)
ABSOLUTE EOS #: 0.3 X10E9/L (ref 0–0.4)
ABSOLUTE LYMPH #: 1.6 X10E9/L (ref 1–3.5)
ABSOLUTE MONO #: 0.4 X10E9/L (ref 0–0.9)
ABSOLUTE NEUT #: 3.7 X10E9/L (ref 1.5–6.6)
ANION GAP SERPL CALCULATED.3IONS-SCNC: 12 MMOL/L (ref 5–15)
BASOPHILS RELATIVE PERCENT: 1 %
BUN BLDV-MCNC: 19 MG/DL (ref 5–27)
CALCIUM SERPL-MCNC: 10.4 MG/DL (ref 8.5–10.5)
CHLORIDE BLD-SCNC: 101 MMOL/L (ref 98–109)
CO2: 26 MMOL/L (ref 22–32)
CREAT SERPL-MCNC: 1.33 MG/DL (ref 0.4–1)
EGFR AFRICAN AMERICAN: 47 ML/MIN/1.73SQ.M
EGFR IF NONAFRICAN AMERICAN: 39 ML/MIN/1.73SQ.M
EOSINOPHILS RELATIVE PERCENT: 4.9 %
GLUCOSE: 80 MG/DL (ref 65–99)
HCT VFR BLD CALC: 39.2 % (ref 35–47)
HEMOGLOBIN: 13.2 G/DL (ref 11.7–15.5)
LYMPHOCYTE %: 26.1 %
MCH RBC QN AUTO: 30 PG (ref 27–34)
MCHC RBC AUTO-ENTMCNC: 33.6 G/DL (ref 32–36)
MCV RBC AUTO: 89 FL (ref 80–100)
MONOCYTES # BLD: 6.7 %
NEUTROPHILS RELATIVE PERCENT: 61.3 %
PDW BLD-RTO: 13.2 % (ref 11.5–15)
PLATELETS: 352 X10E9/L (ref 150–450)
PMV BLD AUTO: 7.9 FL (ref 7–12)
POTASSIUM SERPL-SCNC: 5.2 MMOL/L (ref 3.5–5)
RBC: 4.39 X10E12/L (ref 3.8–5.2)
SODIUM BLD-SCNC: 139 MMOL/L (ref 134–146)
WBC: 6 X10E9/L (ref 4–11)

## 2020-11-05 ENCOUNTER — HOSPITAL ENCOUNTER (EMERGENCY)
Age: 75
Discharge: HOME | End: 2020-11-05
Payer: MEDICARE

## 2020-11-05 VITALS
SYSTOLIC BLOOD PRESSURE: 135 MMHG | OXYGEN SATURATION: 96 % | DIASTOLIC BLOOD PRESSURE: 76 MMHG | BODY MASS INDEX: 24.24 KG/M2 | WEIGHT: 116 LBS | TEMPERATURE: 98.4 F | RESPIRATION RATE: 16 BRPM | HEART RATE: 97 BPM

## 2020-11-05 PROCEDURE — 99213 OFFICE O/P EST LOW 20 MIN: CPT | Performed by: NURSE PRACTITIONER

## 2020-11-05 PROCEDURE — 99212 OFFICE O/P EST SF 10 MIN: CPT

## 2020-11-05 RX ORDER — TRAMADOL HYDROCHLORIDE 50 MG/1
25 TABLET ORAL EVERY 6 HOURS PRN
COMMUNITY
End: 2022-02-10

## 2020-11-05 ASSESSMENT — PAIN - FUNCTIONAL ASSESSMENT: PAIN_FUNCTIONAL_ASSESSMENT: PREVENTS OR INTERFERES WITH MANY ACTIVE NOT PASSIVE ACTIVITIES

## 2020-11-05 ASSESSMENT — ENCOUNTER SYMPTOMS
SHORTNESS OF BREATH: 0
VOMITING: 0
DIARRHEA: 0
RHINORRHEA: 0
SORE THROAT: 0
COUGH: 0
CHEST TIGHTNESS: 0
NAUSEA: 0

## 2020-11-05 ASSESSMENT — PAIN DESCRIPTION - FREQUENCY: FREQUENCY: INTERMITTENT

## 2020-11-05 ASSESSMENT — PAIN DESCRIPTION - DESCRIPTORS: DESCRIPTORS: SHARP

## 2020-11-05 ASSESSMENT — PAIN DESCRIPTION - LOCATION: LOCATION: KNEE

## 2020-11-05 ASSESSMENT — PAIN SCALES - GENERAL: PAINLEVEL_OUTOF10: 7

## 2020-11-05 ASSESSMENT — PAIN DESCRIPTION - ORIENTATION: ORIENTATION: LEFT

## 2020-11-05 ASSESSMENT — PAIN DESCRIPTION - PAIN TYPE: TYPE: ACUTE PAIN

## 2020-11-05 NOTE — ED PROVIDER NOTES
[place] [address]  Urgent Care Encounter       CHIEF COMPLAINT       Chief Complaint   Patient presents with    Knee Injury     pain since 2-2020, worse pain started last night       Nurses Notes reviewed and I agree except as noted in the HPI. HISTORY OF PRESENT ILLNESS   Katie Skinner is a 70 y.o. female who presents to the urgent care for left knee pain. She reports she was ambulating in her living room today and developed a sudden sharp left medial knee pain. She reports that not being able to bear weight on it at that time. She did ambulate into the urgent care with crutches not weightbearing. Upon me entering the room the patient reports her knee pain is completely gone at this time. She is able to ambulate in the room without difficulties. She does report some chronic lumbar, left hip, and left thigh pain. She reports she has seen her PCP, chiropractor, and a orthopedic surgeon in [facility]. Patient is now being seen at pain management for epidural injections. Patient reports this chronic pain is slightly better than normal.  Patient reports she does have tramadol at home for her pain. The history is provided by the patient. No  was used. REVIEW OF SYSTEMS     Review of Systems   Constitutional: Negative for activity change, appetite change, chills, fatigue and fever. HENT: Negative for ear discharge, ear pain, rhinorrhea and sore throat. Respiratory: Negative for cough, chest tightness and shortness of breath. Cardiovascular: Negative for chest pain. Gastrointestinal: Negative for diarrhea, nausea and vomiting. Genitourinary: Negative for dysuria. Musculoskeletal: Positive for arthralgias, gait problem and joint swelling. Skin: Negative for rash. Allergic/Immunologic: Negative for environmental allergies and food allergies. Neurological: Negative for dizziness and headaches.        PAST MEDICAL HISTORY         Diagnosis Date    Angiomyolipoma     left kidney    Arthritis     hands    Blood transfusion reaction     Cancer (HCC)     left breast    Cancer of skin     Chronic kidney disease     Stage III    GERD (gastroesophageal reflux disease)     Hyperlipidemia     Hypertension     Osteopenia     Osteoporosis        SURGICALHISTORY     Patient  has a past surgical history that includes Appendectomy ();  section ( and ); Hysterectomy (); Hemorrhoid surgery (); total nephrectomy (); Cholecystectomy (); Breast surgery (); Breast surgery (); Colonoscopy (); Breast lumpectomy (); Skin cancer excision (Left, 13); skin biopsy (Right, 2013); and skin biopsy (Left, 2013). CURRENT MEDICATIONS       Previous Medications    ACETAMINOPHEN (TYLENOL) 500 MG TABLET    Take 1,000 mg by mouth as needed. ASPIRIN 81 MG EC TABLET    Take 81 mg by mouth daily. ATORVASTATIN (LIPITOR) 10 MG TABLET    TAKE 1 TABLET BY MOUTH EVERY DAY    CALCITONIN (MIACALCIN) 200 UNIT/ACT NASAL SPRAY    USE 1 SPRAY IN ONE NOSTRIL ONCE A DAY, ALTERNATING NOSTRILS DAILY    CALCIUM + D (CALTRATE) 600-200 MG-UNIT TABLET    Take 2 tablets by mouth daily. DEXTROMETHORPHAN POLISTIREX (ROBITUSSIN 12 HOUR COUGH PO)    Take by mouth    LANSOPRAZOLE (PREVACID SOLUTAB) 15 MG DISINTEGRATING TABLET    Take 15 mg by mouth daily    LIDOCAINE (XYLOCAINE) 5 % OINTMENT    Apply topically as needed. LOSARTAN (COZAAR) 25 MG TABLET    TAKE 1 TABLET BY MOUTH EVERY DAY    THERAPEUTIC MULTIVITAMIN-MINERALS (THERAGRAN-M) TABLET    Take 1 tablet by mouth daily. TRAMADOL (ULTRAM) 50 MG TABLET    Take 25 mg by mouth every 6 hours as needed for Pain. ALLERGIES     Patient is is allergic to cipro xr.     Patients   Immunization History   Administered Date(s) Administered    Influenza 10/11/2013    Influenza A ([id number]) Vaccine PF IM 2009    Influenza Vaccine, unspecified formulation General: Abdomen is flat. Bowel sounds are normal.      Palpations: Abdomen is soft. Musculoskeletal: Normal range of motion. General: No swelling, tenderness, deformity or signs of injury. Left lower leg: No edema. Neurological:      General: No focal deficit present. Mental Status: She is alert and oriented to person, place, and time. Psychiatric:         Mood and Affect: Mood normal.         Behavior: Behavior normal.         DIAGNOSTIC RESULTS     Labs:No results found for this visit on 11/05/20. IMAGING:    No orders to display         EKG: None      URGENT CARE COURSE:     Vitals:    11/05/20 1112   BP: 135/76   Pulse: 97   Resp: 16   Temp: 98.4 °F (36.9 °C)   TempSrc: Temporal   SpO2: 96%   Weight: 116 lb (52.6 kg)       Medications - No data to display         PROCEDURES:  None    FINAL IMPRESSION      1. Acute pain of left knee          DISPOSITION/ PLAN     Patient was evaluated for acute left knee pain. Patient reports while waiting in the room the left knee pain has completely resolved. She was able to ambulate in the room without difficulties. She is instructed to follow-up with her PCP for further management of chronic left leg pain. She does report having tramadol at home for pain. She is instructed if the pain returns to rest, apply ice, and compression with Ace wrap. Patient is agreeable to the above plan as discussed.       PATIENT REFERRED TO:  Iman Fletcher MD  [address] [place] [address] / [facility] [address]      DISCHARGE MEDICATIONS:  New Prescriptions    No medications on file       Discontinued Medications    No medications on file       Current Discharge Medication List          [address] [facility]    (Please note that portions of this note were completed with a voice recognition program. Efforts were made to edit the dictations but occasionally words are mis-transcribed.)           ÓSCAR Melchor - CNP  11/05/20 3820

## 2020-11-05 NOTE — ED TRIAGE NOTES
Patient taken to rm. 6 in wheelchair. Patient states fell 2-2020,  Left knee injury,seen [facility], chiropractor, PCP, CAT,MRI, physical therapy,last week, seen orthiopedic surgeon, David Eastman. Nothing has been resolved at this time. Tramadol given for pain. Appt. With Dr. Arabella Pacheco with [facility] yet to be seen. patient using crutches at home, caleb bearing very painful. Worse left knee pain started last night.

## 2020-11-06 ENCOUNTER — OFFICE VISIT (OUTPATIENT)
Dept: FAMILY MEDICINE CLINIC | Age: 75
End: 2020-11-06
Payer: MEDICARE

## 2020-11-06 ENCOUNTER — HOSPITAL ENCOUNTER (OUTPATIENT)
Age: 75
Discharge: HOME | End: 2020-11-06
Payer: MEDICARE

## 2020-11-06 ENCOUNTER — HOSPITAL ENCOUNTER (OUTPATIENT)
Dept: GENERAL RADIOLOGY | Age: 75
Discharge: HOME | End: 2020-11-06
Payer: MEDICARE

## 2020-11-06 VITALS
SYSTOLIC BLOOD PRESSURE: 136 MMHG | HEART RATE: 76 BPM | RESPIRATION RATE: 16 BRPM | DIASTOLIC BLOOD PRESSURE: 84 MMHG | BODY MASS INDEX: 24.45 KG/M2 | TEMPERATURE: 97.2 F | WEIGHT: 117 LBS

## 2020-11-06 PROCEDURE — 1123F ACP DISCUSS/DSCN MKR DOCD: CPT | Performed by: FAMILY MEDICINE

## 2020-11-06 PROCEDURE — 1090F PRES/ABSN URINE INCON ASSESS: CPT | Performed by: FAMILY MEDICINE

## 2020-11-06 PROCEDURE — G8399 PT W/DXA RESULTS DOCUMENT: HCPCS | Performed by: FAMILY MEDICINE

## 2020-11-06 PROCEDURE — G8482 FLU IMMUNIZE ORDER/ADMIN: HCPCS | Performed by: FAMILY MEDICINE

## 2020-11-06 PROCEDURE — G8427 DOCREV CUR MEDS BY ELIG CLIN: HCPCS | Performed by: FAMILY MEDICINE

## 2020-11-06 PROCEDURE — 3017F COLORECTAL CA SCREEN DOC REV: CPT | Performed by: FAMILY MEDICINE

## 2020-11-06 PROCEDURE — 99213 OFFICE O/P EST LOW 20 MIN: CPT | Performed by: FAMILY MEDICINE

## 2020-11-06 PROCEDURE — 1036F TOBACCO NON-USER: CPT | Performed by: FAMILY MEDICINE

## 2020-11-06 PROCEDURE — 4040F PNEUMOC VAC/ADMIN/RCVD: CPT | Performed by: FAMILY MEDICINE

## 2020-11-06 PROCEDURE — 73562 X-RAY EXAM OF KNEE 3: CPT

## 2020-11-06 PROCEDURE — G8420 CALC BMI NORM PARAMETERS: HCPCS | Performed by: FAMILY MEDICINE

## 2020-11-08 ASSESSMENT — ENCOUNTER SYMPTOMS
CHEST TIGHTNESS: 0
BACK PAIN: 0
NAUSEA: 0
SORE THROAT: 0
RHINORRHEA: 0
VOMITING: 0
EYES NEGATIVE: 1
ABDOMINAL PAIN: 0
COUGH: 0
SHORTNESS OF BREATH: 0
DIARRHEA: 0

## 2020-11-08 NOTE — PROGRESS NOTES
[address] [facility] [place] [facility] [address]  Dept: [phone]  Dept Fax: [phone]  Loc: [phone]  PROGRESS NOTE      VisitDate: 11/6/2020    Yaneli Cee is a 70 y.o. female who presents today for:     Chief Complaint   Patient presents with    Follow-Up from [facility] to Urgent Care yesterday for left leg pain. . Pain is on the inner part of the left knee. Urgent Care did not do an xray. It comes and goes. No injury. Subjective:  HPI  Patient comes in for follow-up urgent care where she was seen for left leg pain. She is having pain only in her left knee. She has a history of lumbar disorder foraminal stenosis. I felt that was the source of her knee pain. But her knee is been tender and hurts with range of motion. No known injury, she reports that occasionally it will be fine and then all of a sudden have terrible pain just in the knee it is not radiating from anywhere or to anywhere and after period of time it will be like her knee is normal again and pain-free    Review of Systems   Constitutional: Negative for activity change, appetite change, fatigue and fever. HENT: Negative for congestion, rhinorrhea and sore throat. Eyes: Negative. Respiratory: Negative for cough, chest tightness and shortness of breath. Cardiovascular: Negative for chest pain and palpitations. Gastrointestinal: Negative for abdominal pain, diarrhea, nausea and vomiting. Genitourinary: Negative for dysuria and urgency. Musculoskeletal: Positive for arthralgias. Negative for back pain. Neurological: Negative for dizziness and headaches. Psychiatric/Behavioral: Negative for dysphoric mood. The patient is not nervous/anxious.       Past Medical History:   Diagnosis Date    Angiomyolipoma     left kidney    Arthritis     hands    Blood transfusion reaction     Cancer (HCC)     left breast    Cancer of skin     Chronic kidney disease     Stage III    GERD (gastroesophageal reflux disease)     Hyperlipidemia     Hypertension     Osteopenia     Osteoporosis       Past Surgical History:   Procedure Laterality Date    APPENDECTOMY  1971    BREAST LUMPECTOMY  2009    left    BREAST SURGERY  70's    left biopsy    BREAST SURGERY  2009    Lumpectomy of the Left breast     SE[facility]  [facility] and [address] [facility]  2006    COLONOSCOPY  2004    HEMORRHOID SURGERY  2004    HYSTERECTOMY  1985    SKIN BIOPSY Right 2013    Squemous cell cancer Right leg    SKIN BIOPSY Left 2013    Squemous cell cancer Left leg    SKIN CANCER EXCISION Left 13    Exc SCC Left Lower Leg with flap    TOTAL NEPHRECTOMY  2006    right     Family History   Problem Relation Age of Onset   [facility] Cancer Mother         melanoma, tongue    Hypertension Mother     High Cholesterol Mother     Heart Failure Father      Social History     Tobacco Use    Smoking status: Never Smoker    Smokeless tobacco: Never Used   Substance Use Topics    Alcohol use: No      Current Outpatient Medications   Medication Sig Dispense Refill    traMADol (ULTRAM) 50 MG tablet Take 25 mg by mouth every 6 hours as needed for Pain.  atorvastatin (LIPITOR) 10 MG tablet TAKE 1 TABLET BY MOUTH EVERY DAY 90 tablet 3    losartan (COZAAR) 25 MG tablet TAKE 1 TABLET BY MOUTH EVERY DAY 90 tablet 3    calcitonin (MIACALCIN) 200 UNIT/ACT nasal spray USE 1 SPRAY IN ONE NOSTRIL ONCE A DAY, ALTERNATING NOSTRILS DAILY 11.1 mL 3    lidocaine (XYLOCAINE) 5 % ointment Apply topically as needed. 1 Tube 0    Dextromethorphan Polistirex (ROBITUSSIN 12 HOUR COUGH PO) Take by mouth      lansoprazole (PREVACID SOLUTAB) 15 MG disintegrating tablet Take 15 mg by mouth daily      acetaminophen (TYLENOL) 500 MG tablet Take 1,000 mg by mouth as needed.  Calcium + D (CALTRATE) 600-200 MG-UNIT tablet Take 2 tablets by mouth daily.         aspirin 81 MG EC tablet Take 81 mg by mouth daily.  therapeutic multivitamin-minerals (THERAGRAN-M) tablet Take 1 tablet by mouth daily. No current facility-administered medications for this visit. Allergies   Allergen Reactions    Cipro Xr Swelling and Rash     Health Maintenance   Topic Date Due    DTaP/Tdap/Td vaccine (1 - Tdap) 03/20/1966    Shingles Vaccine (2 of 3) 06/12/2015    Lipid screen  05/13/2021    Annual Wellness Visit (AWV)  08/28/2021    Potassium monitoring  11/03/2021    Creatinine monitoring  11/03/2021    Breast cancer screen  05/12/2022    Colon cancer screen colonoscopy  04/24/2025    DEXA (modify frequency per FRAX score)  Completed    Flu vaccine  Completed    Pneumococcal 65+ years Vaccine  Completed    Hepatitis C screen  Completed    Hepatitis A vaccine  Aged Out    Hepatitis B vaccine  Aged Out    Hib vaccine  Aged Out    Meningococcal (ACWY) vaccine  Aged Out         Objective:     Physical Exam  Constitutional:       General: She is not in acute distress. Appearance: She is well-developed. She is not diaphoretic. HENT:      Head: Normocephalic and atraumatic. Eyes:      General: No scleral icterus. Conjunctiva/sclera: Conjunctivae normal.   Neck:      Thyroid: No thyromegaly. Vascular: No JVD. Comments: No bruits  Cardiovascular:      Rate and Rhythm: Normal rate and regular rhythm. Heart sounds: Normal heart sounds. Pulmonary:      Effort: Pulmonary effort is normal. No respiratory distress. Breath sounds: Normal breath sounds. No wheezing or rales. Abdominal:      Palpations: Abdomen is soft. There is no mass. Tenderness: There is no abdominal tenderness. There is no guarding. Musculoskeletal:         General: Tenderness present. Comments: Mild tenderness along the medial lateral joint lines of the left knee   Skin:     General: Skin is warm and dry. Findings: No rash.    Neurological:      Mental Status: She is alert and oriented to person, place, and time. Cranial Nerves: No cranial nerve deficit. /84   Pulse 76   Temp 97.2 °F (36.2 °C) (Temporal)   Resp 16   Wt 117 lb (53.1 kg)   BMI 24.45 kg/m²       Impression/Plan:  1. Acute pain of left knee      Requested Prescriptions      No prescriptions requested or ordered in this encounter     Orders Placed This Encounter   Procedures    XR KNEE LEFT (3 VIEWS)     Standing Status:   Future     Number of Occurrences:   1     Standing Expiration Date:   11/6/2021   Discussed concerns for likely cartilage tear in the knee versus loose body in the joint given the nature of her symptoms and will likely require an MRI    Patient giveneducational materials - see patient instructions. Discussed use, benefit, and side effects of prescribed medications. All patient questions answered. Pt voiced understanding. Reviewed health maintenance. Patient agreedwith treatment plan. Follow up as directed. **This report has been created using voice recognition software. It may contain minor errorswhich are inherent in voice recognition technology. **       Electronically signed by Stephania Cast MD on 11/8/2020 at 12:28 PM

## 2020-11-09 ENCOUNTER — TELEPHONE (OUTPATIENT)
Dept: FAMILY MEDICINE CLINIC | Age: 75
End: 2020-11-09

## 2020-11-09 NOTE — TELEPHONE ENCOUNTER
----- Message from Wilbur Mares MD sent at 11/9/2020  6:53 AM EST -----  X-ray of the knee shows mild arthritis, recommend MRI of the knee for the diagnosis of internal derangement, meniscal tear

## 2020-11-09 NOTE — TELEPHONE ENCOUNTER
Given results. MRI is ordered and she will call and get this scheduled at [facility].

## 2020-11-10 PROBLEM — Z78.0 POSTMENOPAUSAL STATE: Status: ACTIVE | Noted: 2020-11-10

## 2020-11-11 ENCOUNTER — OFFICE VISIT (OUTPATIENT)
Dept: NEPHROLOGY | Age: 75
End: 2020-11-11
Payer: MEDICARE

## 2020-11-11 VITALS
HEART RATE: 106 BPM | OXYGEN SATURATION: 99 % | DIASTOLIC BLOOD PRESSURE: 76 MMHG | RESPIRATION RATE: 18 BRPM | WEIGHT: 118 LBS | BODY MASS INDEX: 24.77 KG/M2 | HEIGHT: 58 IN | TEMPERATURE: 98 F | SYSTOLIC BLOOD PRESSURE: 127 MMHG

## 2020-11-11 PROCEDURE — G8420 CALC BMI NORM PARAMETERS: HCPCS | Performed by: INTERNAL MEDICINE

## 2020-11-11 PROCEDURE — 4040F PNEUMOC VAC/ADMIN/RCVD: CPT | Performed by: INTERNAL MEDICINE

## 2020-11-11 PROCEDURE — 99213 OFFICE O/P EST LOW 20 MIN: CPT | Performed by: INTERNAL MEDICINE

## 2020-11-11 PROCEDURE — G8482 FLU IMMUNIZE ORDER/ADMIN: HCPCS | Performed by: INTERNAL MEDICINE

## 2020-11-11 PROCEDURE — G8427 DOCREV CUR MEDS BY ELIG CLIN: HCPCS | Performed by: INTERNAL MEDICINE

## 2020-11-11 PROCEDURE — G8399 PT W/DXA RESULTS DOCUMENT: HCPCS | Performed by: INTERNAL MEDICINE

## 2020-11-11 PROCEDURE — 1036F TOBACCO NON-USER: CPT | Performed by: INTERNAL MEDICINE

## 2020-11-11 PROCEDURE — 1090F PRES/ABSN URINE INCON ASSESS: CPT | Performed by: INTERNAL MEDICINE

## 2020-11-11 PROCEDURE — 1123F ACP DISCUSS/DSCN MKR DOCD: CPT | Performed by: INTERNAL MEDICINE

## 2020-11-11 PROCEDURE — 3017F COLORECTAL CA SCREEN DOC REV: CPT | Performed by: INTERNAL MEDICINE

## 2020-11-11 NOTE — PATIENT INSTRUCTIONS
KNOW YOUR KIDNEY NUMBERS    Your kidney speed (eGFR) was 39 ml/min this visit (normal is  ml/min)(Ml/min=milliliters of blood filtered per minute). The higher this number is, the better your kidney function is. Your serum creatinine was 1.33 (normal 0.8-1.2 mg/dl at most labs). The higher this number is, the worse your kidney function is. You are in stage G-IIIB-A1 of chronic kidney disease. Your kidney function has declined as compared to your last visit. Your last eGFR was  44 Ml/Min. Stages of kidney disease    EGFR (estimated glomerular filtration rate)    G-I > 90 ml/min Kidney damage with normal kidney function (blood or protein in the urine)  G-II 60-89 ml/min Normal kidney function with mild damage with or without blood or protein in the urine  G-IIIA 45-59 ml/min Mild to moderate loss of kidney function. G-IIIB 30-44 ml/min Moderate to severe loss of kidney function  G-IV 15-29 ml/min Severe loss of kidney function  G-V < 15 mlmin     May need dialysis or kidney transplant    ACR (urine albumin/creatinine ratio) (Mg/Gm)    A-1      ACR<30 Normal to mildly increased protein in the urine. A-2 ACR  Moderate increase in urine protein loss. A-3 ACR >300 Severe increase in urine protein loss    Our goal is to keep your eGFR going as fast as possible ( ml/min is normal). If your eGFR declines to 15-24 ml/min and stays there without recovery,  we will begin to educate you about dialysis or kidney transplant. We also want to keep the protein in your urine as low as possible. The leading cause of kidney disease in the world is diabetes mellitus. Keep your sugar  as much as possible. The second leading cause is hypertension. Keep Your blood pressure 120-140/70-80 as much as possible. If you need refills, call the office or your drug store. You may call the office any time with any questions or concerns.     DUE TO THE CORONAVIRUS CONCERN, PLEASE LIMIT YOUR TIME IN PUBLIC. [address] YOUR HANDS COMPLETELY AND FREQUENTLY. Candy Sayer  Ada Freeman

## 2020-11-11 NOTE — PROGRESS NOTES
[facility]    [address]  [address], [place]  [phone]       Progress Note    11/11/2020 1:44 PM    Pt Name:    Solomon Rios:    1945  Primary Care Physician:  Sharif Bray MD       Chief Complaint:   Chief Complaint   Patient presents with    Chronic Kidney Disease    Other     S/P right nephrevctomy for angiolipoma    Hypertension        History of Chief Complaint: CKD stage G-IIIB-A1 from right nephrectomy done for angiolipoma.        Subjective:  I last saw the patient in clinic 05/19/2020. I follow the patient for Chronic Kidney disease stage G-IIIB-A1. Since our last visit the patient has not been hospitalized. The patient is sleeping well at night with 1-2 times per night nocturia. The patient has a good appetite and is remaining active. The patient denied N/V/C/D/SOB/CP. She has no trouble at bladder emptying. She fell and tore a meniscus. COVID-19 screening  Fever: none  Cough: none  Exposure: none  Shortness of breath: none    Protein/creatinine ratio:   eGFR: 39 Ml/min      Last six eGFR readings:  Lab Results   Component Value Date    LABGLOM 44 04/17/2019    LABGLOM 49 04/18/2018    LABGLOM 37 08/11/2014    LABGLOM 41 05/09/2014    LABGLOM 35 05/08/2014          Objective:  VITALS:  /76 (Site: Right Upper Arm, Position: Sitting, Cuff Size: Small Adult)   Pulse 106   Temp 98 °F (36.7 °C)   Resp 18   Ht 4' 10\" (1.473 m)   Wt 118 lb (53.5 kg)   SpO2 99%   BMI 24.66 kg/m²   Weight:   Wt Readings from Last 3 Encounters:   11/11/20 118 lb (53.5 kg)   11/06/20 117 lb (53.1 kg)   11/05/20 116 lb (52.6 kg)     Body mass index is 24.66 kg/m². Physical examination    General:  Alert and cooperative with exam  HEENT:  Normocephalic. No lesions nor rashes. VINCENT. EOMI  Neck:   No JVD and no bruits. Thyroid gland is normal  Lungs:  Breathing easily. No rales nor rhonchi. No cough nor sputum production.   Heart[de-identified]            RRR. <1.1 08/13/2020    BILIRUBINUR NEGATIVE 05/08/2014    BLOODU Negative 09/03/2015    GLUCOSEU neg 09/03/2015    KETUA Negative 08/13/2020      Microalbumen/Creatinine ratio:  No components found for: RUCREAT        Medications:    Current Outpatient Medications   Medication Sig Dispense Refill    traMADol (ULTRAM) 50 MG tablet Take 25 mg by mouth every 6 hours as needed for Pain.  atorvastatin (LIPITOR) 10 MG tablet TAKE 1 TABLET BY MOUTH EVERY DAY 90 tablet 3    losartan (COZAAR) 25 MG tablet TAKE 1 TABLET BY MOUTH EVERY DAY 90 tablet 3    calcitonin (MIACALCIN) 200 UNIT/ACT nasal spray USE 1 SPRAY IN ONE NOSTRIL ONCE A DAY, ALTERNATING NOSTRILS DAILY 11.1 mL 3    lidocaine (XYLOCAINE) 5 % ointment Apply topically as needed. 1 Tube 0    lansoprazole (PREVACID SOLUTAB) 15 MG disintegrating tablet Take 15 mg by mouth daily      acetaminophen (TYLENOL) 500 MG tablet Take 1,000 mg by mouth as needed.  Calcium + D (CALTRATE) 600-200 MG-UNIT tablet Take 2 tablets by mouth daily.  aspirin 81 MG EC tablet Take 81 mg by mouth daily.  therapeutic multivitamin-minerals (THERAGRAN-M) tablet Take 1 tablet by mouth daily. No current facility-administered medications for this visit. IMPRESSIONS:      Kidney disease: CKD stage G-IIIB-A1  Anemia: Anemia remains stable. No need for an erythrocyte stimulating agent (LUPIS). Bone and mineral metabolism: There is no complaint of bone pain. PLAN:  1. We discussed the eGFR today. 2. We will continue all current medications without changes. 3. It is OK with me for her to go to surgery if needed. 4. We will see the patient back in 4 months.       _________________________________  Blanka Fry.  Avery Zamudio DO  [facility]      David Chand MD

## 2020-11-16 ENCOUNTER — OFFICE VISIT (OUTPATIENT)
Dept: PHYSICAL MEDICINE AND REHAB | Age: 75
End: 2020-11-16
Payer: MEDICARE

## 2020-11-16 VITALS
DIASTOLIC BLOOD PRESSURE: 74 MMHG | BODY MASS INDEX: 24.77 KG/M2 | SYSTOLIC BLOOD PRESSURE: 120 MMHG | TEMPERATURE: 97.5 F | WEIGHT: 118 LBS | HEIGHT: 58 IN

## 2020-11-16 PROCEDURE — 3017F COLORECTAL CA SCREEN DOC REV: CPT | Performed by: ANESTHESIOLOGY

## 2020-11-16 PROCEDURE — 1036F TOBACCO NON-USER: CPT | Performed by: ANESTHESIOLOGY

## 2020-11-16 PROCEDURE — 1090F PRES/ABSN URINE INCON ASSESS: CPT | Performed by: ANESTHESIOLOGY

## 2020-11-16 PROCEDURE — G8399 PT W/DXA RESULTS DOCUMENT: HCPCS | Performed by: ANESTHESIOLOGY

## 2020-11-16 PROCEDURE — G8420 CALC BMI NORM PARAMETERS: HCPCS | Performed by: ANESTHESIOLOGY

## 2020-11-16 PROCEDURE — 99204 OFFICE O/P NEW MOD 45 MIN: CPT | Performed by: ANESTHESIOLOGY

## 2020-11-16 PROCEDURE — 1123F ACP DISCUSS/DSCN MKR DOCD: CPT | Performed by: ANESTHESIOLOGY

## 2020-11-16 PROCEDURE — 4040F PNEUMOC VAC/ADMIN/RCVD: CPT | Performed by: ANESTHESIOLOGY

## 2020-11-16 PROCEDURE — G8482 FLU IMMUNIZE ORDER/ADMIN: HCPCS | Performed by: ANESTHESIOLOGY

## 2020-11-16 PROCEDURE — G8427 DOCREV CUR MEDS BY ELIG CLIN: HCPCS | Performed by: ANESTHESIOLOGY

## 2020-11-16 NOTE — PATIENT INSTRUCTIONS
The following treatment recommendations and plan were discussed in detail with Renae Gaxiola. Imaging:   I have reviewed patients imaging of MRI L-spine and left hip XRs and results were discussed with patient today. Analgesics:   Patient is taking Acetaminophen. Patient informed that the maximum amount of acetaminophen taken on a regular basis should only be 4000 mg per day. Adjuvants:   None    Interventions: With left SI joint dysfunction/pain, we will proceed with a left SI joint injection. Anticoagulation/NPO Recommendations:   Patient does not need to hold any medications prior to the procedure. Patient will need to be NPO x 8 hours prior to the procedure. We will start an IV prior to the procedure. Multidisciplinary Pain Management:   In the presence of complex, chronic, and multi-factorial pain, the importance of a multidisciplinary approach to pain management in the patients management regimen was emphasized and discussed in great detail.    PHYSICAL THERAPY: I encouraged the patient to continue her core strengthening exercise program    Referrals:  None    Prescriptions Written This Visit:   None    Follow-up: For left SI joint injection

## 2020-11-16 NOTE — PROGRESS NOTES
dextrocurvature of the lumbar spine, similar to prior radiographs. There is grade 1 anterolisthesis of L4 relative to L5 and grade 2 anterolisthesis of L5 relative to S1 on the basis of degenerative change, also stable compared to prior    radiographs. There is otherwise anatomic vertebral body height and alignment. There is disc space narrowing and mild osteophytosis at T11-12 with hyperintense T1 and T2 signal at the opposing endplates as evidence for Modic 2 degenerative change. Marrow    signal is otherwise within normal limits. The conus terminates in a normal fashion at the L1-2 level. Paraspinal soft tissues are unremarkable. At T12-L1 there is no significant spinal canal or neuroforaminal stenosis. At L1-2 there is no significant spinal canal or neuroforaminal stenosis. At L2-3 there is a shallow disc bulge without significant spinal canal or neuroforaminal stenosis. At L3-4 there is a shallow disc bulge without significant spinal canal or neuroforaminal stenosis. At L4-5 there is partial uncovering of the disc with superimposed shallow disc bulge which contributes to mild spinal canal stenosis in association with facet hypertrophy and ligament flavum thickening. There is mild to moderate bilateral neural    foraminal stenosis. At L5-S1 there is partial uncovering of the disc with superimposed shallow disc bulge which contributes to moderate spinal canal stenosis in association with facet hypertrophy and ligamentum flavum thickening. There is flattening of the neural foramina    with moderate bilateral neural foraminal stenosis.           Impression     Multilevel degenerative changes most pronounced at L5-S1 where prominent facet hypertrophy and ligamentum flavum thickening cause grade 2 anterolisthesis of L5 relative to S1 with partial uncovering the disc with superimposed disc bulge contributing to    moderate spinal canal stenosis and moderate bilateral neural foraminal stenosis.                    Past Medical History:   Diagnosis Date    Angiomyolipoma     left kidney    Arthritis     hands    Blood transfusion reaction     Cancer (HCC)     left breast    Cancer of skin     Chronic kidney disease     Stage III    GERD (gastroesophageal reflux disease)     Hyperlipidemia     Hypertension     Osteopenia     Osteoporosis        Past Surgical History:   Procedure Laterality Date    APPENDECTOMY  1971    BREAST LUMPECTOMY  2009    left    BREAST SURGERY  70's    left biopsy    BREAST SURGERY  2009    Lumpectomy of the Left breast     SECTION  1971 and [id number] [facility]  2006    COLONOSCOPY  2004    HEMORRHOID SURGERY  2004    HYSTERECTOMY  1985    SKIN BIOPSY Right 2013    Squemous cell cancer Right leg    SKIN BIOPSY Left 2013    Squemous cell cancer Left leg    SKIN CANCER EXCISION Left 13    Exc SCC Left Lower Leg with flap    TOTAL NEPHRECTOMY  2006    right       Family History   Problem Relation Age of Onset   [employer] Cancer Mother         melanoma, tongue    Hypertension Mother     High Cholesterol Mother     Heart Failure Father        Social History     Socioeconomic History    Marital status:      Spouse name: Not on file    Number of children: Not on file    Years of education: Not on file    Highest education level: Not on file   Occupational History     Employer: [address] [place] [address] [employer]    Financial resource strain: Not on file    Food insecurity     Worry: Not on file     Inability: Not on file    Transportation needs     Medical: Not on file     Non-medical: Not on file   Tobacco Use    Smoking status: Never Smoker    Smokeless tobacco: Never Used   Substance and Sexual Activity    Alcohol use: No    Drug use: No    Sexual activity: Not Currently   Lifestyle    Physical activity     Days per week: Not on file     Minutes per session: Not on file    Stress: Not on file   Relationships    Social connections     Talks on phone: Not on file     Gets together: Not on file     Attends [employer] service: Not on file     Active member of club or organization: Not on file     Attends meetings of clubs or organizations: Not on file     Relationship status: Not on file    Intimate partner violence     Fear of current or ex partner: Not on file     Emotionally abused: Not on file     Physically abused: Not on file     Forced sexual activity: Not on file   Other Topics Concern    Not on file   Social History Narrative    Not on file       Medications & Allergies:   Current Outpatient Medications   Medication Instructions    acetaminophen (TYLENOL) 1,000 mg, PRN    aspirin 81 mg, DAILY    atorvastatin (LIPITOR) 10 MG tablet TAKE 1 TABLET BY MOUTH EVERY DAY    calcitonin (MIACALCIN) 200 UNIT/ACT nasal spray USE 1 SPRAY IN ONE NOSTRIL ONCE A DAY, ALTERNATING NOSTRILS DAILY    Calcium + D (CALTRATE) 600-200 MG-UNIT tablet 2 tablets, DAILY    lansoprazole (PREVACID SOLUTAB) 15 mg, Oral, DAILY    lidocaine (XYLOCAINE) 5 % ointment Apply topically as needed.     losartan (COZAAR) 25 MG tablet TAKE 1 TABLET BY MOUTH EVERY DAY    therapeutic multivitamin-minerals (THERAGRAN-M) tablet 1 tablet, DAILY    traMADol (ULTRAM) 25 mg, Oral, EVERY 6 HOURS PRN       Allergies   Allergen Reactions    Cipro Xr Swelling and Rash       Review of Systems:   Constitutional: negative for weight changes or fevers  Genitourinary: negative for bowel/bladder incontinence   Musculoskeletal: left buttocks and groin pain  Neurological: positive for left foot numbness/tingling  Behavioral/Psych: positive for anxiety/depression   All other systems reviewed and are negative    Objective:     Vitals:    11/16/20 1423   BP: 120/74   Temp: 97.5 °F (36.4 °C)     Constitutional: Pleasant, no acute distress   Head: Normocephalic, atraumatic   Eyes: Conjunctivae normal   Neck: Supple, symmetrical   Lungs: Normal respiratory effort, non-labored breathing   Cardiovascular: Limbs warm and well perfused   Abdomen: Non-protruded   Musculoskeletal: Muscle bulk symmetric, no atrophy, no gross deformities   · Lumbar Spine: Leg length discrepancy appreciated (left leg 1 inch longer than right). Lumbar paraspinals non-tender bilaterally. SLR neg bilaterally. JOSÉ LUIS positive on left. Negative FADIR on left. GAENSLEN positive on left. Negative facet loading bilaterally. Left SI joints tender to palpation. Bilateral greater trochanters non-tender to palpation. Neurological: Cranial nerves II-XII grossly intact. · Gait - Antalgic gait. Ambulates with assist device. · Motor: 5/5 muscle strength in bilateral hip flexion, knee flexion, knee extension, dorsiflexion, and plantar flexion   · Sensory: LT sensation intact in lower limbs   · Reflexes: 2+ symmetrical in bilateral achilles, 2+ bilateral patellar, negative ankle clonus  Skin: No rashes or lesions present   Psychological: Cooperative, no exaggerated pain behaviors         Assessment:    Diagnosis Orders   1. Chronic left SI joint pain  CHG FLUOR NEEDLE/CATH SPINE/PARASPINAL DX/THER ADDON    FLUORO FOR SURGICAL PROCEDURES    CO INJECT SI JOINT ARTHRGRPHY&/ANES/STEROID W/IMAGE   2. Other chronic pain     3. Spinal stenosis of lumbar region, unspecified whether neurogenic claudication present     4. Chronic left-sided low back pain with left-sided sciatica         Heriberto Brown is a 70 y. o.female presenting to the pain clinic for evaluation of chronic left buttocks and anterior groin pain. Her clinical history is consistent with left SI joint dysfunction/pain in the setting of a leg length discrepancy, antalgic gait, and traumatic fall. We have set her up for a left SI joint injection.   Her physical examination is not as remarkable and she could have some symptoms coming from her lumbar spine as she does have a known listhesis causing significant central canal stenosis and bilateral neuroforaminal stenosis at the L5/S1 level. Plan: The following treatment recommendations and plan were discussed in detail with Katie Skinner. Imaging:   I have reviewed patients imaging of MRI L-spine and left hip XRs and results were discussed with patient today. Analgesics:   Patient is taking Acetaminophen. Patient informed that the maximum amount of acetaminophen taken on a regular basis should only be 4000 mg per day. Adjuvants:   None    Interventions: With left SI joint dysfunction/pain, we will proceed with a left SI joint injection. Anticoagulation/NPO Recommendations:   Patient does not need to hold any medications prior to the procedure. Patient will need to be NPO x 8 hours prior to the procedure. We will start an IV prior to the procedure. Multidisciplinary Pain Management:   In the presence of complex, chronic, and multi-factorial pain, the importance of a multidisciplinary approach to pain management in the patients management regimen was emphasized and discussed in great detail.    PHYSICAL THERAPY: I encouraged the patient to continue her core strengthening exercise program    Referrals:  None    Prescriptions Written This Visit:   None    Follow-up: For left SI joint injection      Teofilo Fabian, DO  Interventional Pain Management/PM&R   [place]

## 2020-11-24 ENCOUNTER — HOSPITAL ENCOUNTER (OUTPATIENT)
Dept: MRI IMAGING | Age: 75
Discharge: HOME | End: 2020-11-24
Payer: MEDICARE

## 2020-11-24 PROCEDURE — 73721 MRI JNT OF LWR EXTRE W/O DYE: CPT

## 2020-11-25 ENCOUNTER — TELEPHONE (OUTPATIENT)
Dept: FAMILY MEDICINE CLINIC | Age: 75
End: 2020-11-25

## 2020-11-30 NOTE — H&P
Today, patient presents for planned left SI joint injection. This note is reflective of the patient's previous visit for evaluation. We will proceed with today's planned procedure. Since patient's last visit for evaluation, there have been no interval changes in medical history. Patient has no new numbness, weakness, or focal neurological deficit since evaluation. Patient has no contraindications to injection (no anticoagulation, recent antibiotic intake for active infections, allergies to latex / contrast dye / steroid medications), and has a  present. NPO necessity has been assessed and accepted based on procedure complexity. The risks and benefits of the procedure have been explained including but are not limited to infection, bleeding, paralysis, immediate post procedure weakness, and dizziness; the patient acknowledges understanding and desires to proceed with the procedure. Consented for same procedure. All other questions and concerns were addressed at bedside. See procedure note for full details. Vitals:    12/01/20 1046   BP: (!) 152/82   Pulse: 101   Resp: (!) 7   Temp:    SpO2: 95%          Diagnosis Orders   1. Chronic left SI joint pain  CHG FLUOR NEEDLE/CATH SPINE/PARASPINAL DX/THER ADDON    FLUORO FOR SURGICAL PROCEDURES    [place] INJECT SI JOINT ARTHRGRPHY&/ANES/STEROID W/IMAGE   2. Other chronic pain     3. Spinal stenosis of lumbar region, unspecified whether neurogenic claudication present     4. Chronic left-sided low back pain with left-sided sciatica         Follow up: 2/10/21     Post procedure Instructions: The patient was advised not to drive during the day of the procedure and not to engage in any significant decision making. The patient was also advised to be cautious with walking/activity for 24 hours following today's visit and asked not to engage in over-exertion. After this time, it is ok to resume pre-procedure level of activity.  Patient advised to apply ice to site of most pronounced at L5-S1 where prominent facet hypertrophy and ligamentum flavum thickening cause grade 2 anterolisthesis of L5 relative to S1 with partial uncovering the disc with superimposed disc bulge contributing to    moderate spinal canal stenosis and moderate bilateral neural foraminal stenosis.                        Past Medical History:   Diagnosis Date    Angiomyolipoma     left kidney    Arthritis     hands    Blood transfusion reaction     Cancer (HCC)     left breast    Cancer of skin     Chronic kidney disease     Stage III    GERD (gastroesophageal reflux disease)     Hyperlipidemia     Hypertension     Osteopenia     Osteoporosis        Past Surgical History:   Procedure Laterality Date    APPENDECTOMY  1971    BREAST LUMPECTOMY  2009    left    BREAST SURGERY  70's    left biopsy    BREAST SURGERY  2009    Lumpectomy of the Left breast     SECTION  1971 and [id number] [facility]  2006    COLONOSCOPY  2004    HEMORRHOID SURGERY  2004    HYSTERECTOMY  1985    SKIN BIOPSY Right 2013    Squemous cell cancer Right leg    SKIN BIOPSY Left 2013    Squemous cell cancer Left leg    SKIN CANCER EXCISION Left 13    Exc SCC Left Lower Leg with flap    TOTAL NEPHRECTOMY  2006    right       Family History   Problem Relation Age of Onset   Anny Halim Cancer Mother         melanoma, tongue    Hypertension Mother     High Cholesterol Mother     Heart Failure Father        Social History     Socioeconomic History    Marital status:      Spouse name: Not on file    Number of children: Not on file    Years of education: Not on file    Highest education level: Not on file   Occupational History     Employer: [employer] [employer]    Financial resource strain: Not on file    Food insecurity     Worry: Not on file     Inability: Not on file    Transportation needs     Medical: Not on file     Non-medical: Not on file   Tobacco Use    Smoking status: Never Smoker    Smokeless tobacco: Never Used   Substance and Sexual Activity    Alcohol use: No    Drug use: No    Sexual activity: Not Currently   Lifestyle    Physical activity     Days per week: Not on file     Minutes per session: Not on file    Stress: Not on file   Relationships    Social connections     Talks on phone: Not on file     Gets together: Not on file     Attends [employer] service: Not on file     Active member of club or organization: Not on file     Attends meetings of clubs or organizations: Not on file     Relationship status: Not on file    Intimate partner violence     Fear of current or ex partner: Not on file     Emotionally abused: Not on file     Physically abused: Not on file     Forced sexual activity: Not on file   Other Topics Concern    Not on file   Social History Narrative    Not on file       Medications & Allergies:   Current Outpatient Medications   Medication Instructions    acetaminophen (TYLENOL) 1,000 mg, PRN    aspirin 81 mg, DAILY    atorvastatin (LIPITOR) 10 MG tablet TAKE 1 TABLET BY MOUTH EVERY DAY    calcitonin (MIACALCIN) 200 UNIT/ACT nasal spray USE 1 SPRAY IN ONE NOSTRIL ONCE A DAY, ALTERNATING NOSTRILS DAILY    Calcium + D (CALTRATE) 600-200 MG-UNIT tablet 2 tablets, DAILY    lansoprazole (PREVACID SOLUTAB) 15 mg, Oral, DAILY    lidocaine (XYLOCAINE) 5 % ointment Apply topically as needed.     losartan (COZAAR) 25 MG tablet TAKE 1 TABLET BY MOUTH EVERY DAY    therapeutic multivitamin-minerals (THERAGRAN-M) tablet 1 tablet, DAILY    traMADol (ULTRAM) 25 mg, Oral, EVERY 6 HOURS PRN       Allergies   Allergen Reactions    Cipro Xr Swelling and Rash       Review of Systems:   Constitutional: negative for weight changes or fevers  Genitourinary: negative for bowel/bladder incontinence   Musculoskeletal: left buttocks and groin pain  Neurological: positive for left foot numbness/tingling  Behavioral/Psych: positive for anxiety/depression   All other systems reviewed and are negative    Objective: There were no vitals filed for this visit. Constitutional: Pleasant, no acute distress   Head: Normocephalic, atraumatic   Eyes: Conjunctivae normal   Neck: Supple, symmetrical   Lungs: Normal respiratory effort, non-labored breathing   Cardiovascular: Limbs warm and well perfused   Abdomen: Non-protruded   Musculoskeletal: Muscle bulk symmetric, no atrophy, no gross deformities   · Lumbar Spine: Leg length discrepancy appreciated (left leg 1 inch longer than right). Lumbar paraspinals non-tender bilaterally. SLR neg bilaterally. JOSÉ LUIS positive on left. Negative FADIR on left. GAENSLEN positive on left. Negative facet loading bilaterally. Left SI joints tender to palpation. Bilateral greater trochanters non-tender to palpation. Neurological: Cranial nerves II-XII grossly intact. · Gait - Antalgic gait. Ambulates with assist device. · Motor: 5/5 muscle strength in bilateral hip flexion, knee flexion, knee extension, dorsiflexion, and plantar flexion   · Sensory: LT sensation intact in lower limbs   · Reflexes: 2+ symmetrical in bilateral achilles, 2+ bilateral patellar, negative ankle clonus  Skin: No rashes or lesions present   Psychological: Cooperative, no exaggerated pain behaviors         Assessment:    Diagnosis Orders   1. Chronic left SI joint pain  CHG FLUOR NEEDLE/CATH SPINE/PARASPINAL DX/THER ADDON    FLUORO FOR SURGICAL PROCEDURES    [place] INJECT SI JOINT ARTHRGRPHY&/ANES/STEROID W/IMAGE   2. Other chronic pain     3. Spinal stenosis of lumbar region, unspecified whether neurogenic claudication present     4. Chronic left-sided low back pain with left-sided sciatica         Lonnie Haque is a 70 y. o.female presenting to the pain clinic for evaluation of chronic left buttocks and anterior groin pain.   Her clinical history is consistent with left SI joint dysfunction/pain in the setting of a leg length discrepancy, antalgic gait, and traumatic fall.  We have set her up for a left SI joint injection. Her physical examination is not as remarkable and she could have some symptoms coming from her lumbar spine as she does have a known listhesis causing significant central canal stenosis and bilateral neuroforaminal stenosis at the L5/S1 level. Plan: The following treatment recommendations and plan were discussed in detail with Brigitte Barry. Imaging:   I have reviewed patients imaging of MRI L-spine and left hip XRs and results were discussed with patient today. Analgesics:   Patient is taking Acetaminophen. Patient informed that the maximum amount of acetaminophen taken on a regular basis should only be 4000 mg per day. Adjuvants:   None    Interventions: With left SI joint dysfunction/pain, we will proceed with a left SI joint injection. Anticoagulation/NPO Recommendations:   Patient does not need to hold any medications prior to the procedure. Patient will need to be NPO x 8 hours prior to the procedure. We will start an IV prior to the procedure. Multidisciplinary Pain Management:   In the presence of complex, chronic, and multi-factorial pain, the importance of a multidisciplinary approach to pain management in the patients management regimen was emphasized and discussed in great detail.    PHYSICAL THERAPY: I encouraged the patient to continue her core strengthening exercise program    Referrals:  None    Prescriptions Written This Visit:   None    Follow-up: For left SI joint injection      Teofilo Garcia, DO  Interventional Pain Management/PM&R   [place]

## 2020-12-01 ENCOUNTER — HOSPITAL ENCOUNTER (OUTPATIENT)
Age: 75
Setting detail: OUTPATIENT SURGERY
Discharge: HOME | End: 2020-12-01
Attending: ANESTHESIOLOGY | Admitting: ANESTHESIOLOGY
Payer: MEDICARE

## 2020-12-01 ENCOUNTER — HOSPITAL ENCOUNTER (OUTPATIENT)
Dept: GENERAL RADIOLOGY | Age: 75
Setting detail: OUTPATIENT SURGERY
Discharge: HOME | End: 2020-12-01
Attending: ANESTHESIOLOGY
Payer: MEDICARE

## 2020-12-01 VITALS
OXYGEN SATURATION: 96 % | SYSTOLIC BLOOD PRESSURE: 144 MMHG | WEIGHT: 116 LBS | TEMPERATURE: 97.5 F | HEART RATE: 100 BPM | DIASTOLIC BLOOD PRESSURE: 79 MMHG | BODY MASS INDEX: 24.35 KG/M2 | HEIGHT: 58 IN | RESPIRATION RATE: 16 BRPM

## 2020-12-01 PROCEDURE — 27096 INJECT SACROILIAC JOINT: CPT | Performed by: ANESTHESIOLOGY

## 2020-12-01 PROCEDURE — 3600000052 HC PAIN LEVEL 2 BASE: Performed by: ANESTHESIOLOGY

## 2020-12-01 PROCEDURE — 6360000004 HC RX CONTRAST MEDICATION: Performed by: ANESTHESIOLOGY

## 2020-12-01 PROCEDURE — 3209999900 FLUORO FOR SURGICAL PROCEDURES

## 2020-12-01 PROCEDURE — 7100000010 HC PHASE II RECOVERY - FIRST 15 MIN: Performed by: ANESTHESIOLOGY

## 2020-12-01 PROCEDURE — 99152 MOD SED SAME PHYS/QHP 5/>YRS: CPT | Performed by: ANESTHESIOLOGY

## 2020-12-01 PROCEDURE — 2709999900 HC NON-CHARGEABLE SUPPLY: Performed by: ANESTHESIOLOGY

## 2020-12-01 PROCEDURE — 6360000002 HC RX W HCPCS: Performed by: ANESTHESIOLOGY

## 2020-12-01 PROCEDURE — 2500000003 HC RX 250 WO HCPCS: Performed by: ANESTHESIOLOGY

## 2020-12-01 RX ORDER — LIDOCAINE HYDROCHLORIDE 10 MG/ML
INJECTION, SOLUTION EPIDURAL; INFILTRATION; INTRACAUDAL; PERINEURAL PRN
Status: DISCONTINUED | OUTPATIENT
Start: 2020-12-01 | End: 2020-12-01 | Stop reason: ALTCHOICE

## 2020-12-01 RX ORDER — FENTANYL CITRATE 50 UG/ML
INJECTION, SOLUTION INTRAMUSCULAR; INTRAVENOUS PRN
Status: DISCONTINUED | OUTPATIENT
Start: 2020-12-01 | End: 2020-12-01 | Stop reason: ALTCHOICE

## 2020-12-01 RX ORDER — MIDAZOLAM HYDROCHLORIDE 1 MG/ML
INJECTION INTRAMUSCULAR; INTRAVENOUS PRN
Status: DISCONTINUED | OUTPATIENT
Start: 2020-12-01 | End: 2020-12-01 | Stop reason: ALTCHOICE

## 2020-12-01 RX ORDER — METHYLPREDNISOLONE ACETATE 40 MG/ML
INJECTION, SUSPENSION INTRA-ARTICULAR; INTRALESIONAL; INTRAMUSCULAR; SOFT TISSUE PRN
Status: DISCONTINUED | OUTPATIENT
Start: 2020-12-01 | End: 2020-12-01 | Stop reason: ALTCHOICE

## 2020-12-01 RX ORDER — BUPIVACAINE HYDROCHLORIDE 5 MG/ML
INJECTION, SOLUTION PERINEURAL PRN
Status: DISCONTINUED | OUTPATIENT
Start: 2020-12-01 | End: 2020-12-01 | Stop reason: ALTCHOICE

## 2020-12-01 ASSESSMENT — PAIN - FUNCTIONAL ASSESSMENT: PAIN_FUNCTIONAL_ASSESSMENT: 0-10

## 2020-12-01 ASSESSMENT — PAIN DESCRIPTION - DESCRIPTORS: DESCRIPTORS: ACHING

## 2020-12-01 NOTE — PROGRESS NOTES
Dr. Sandhya Crawford updated that patient only has one kidney and does not want large amount of dye. Discharge instructions reviewed. Verbalized understanding.

## 2020-12-01 NOTE — PROCEDURES
Pre-operative Diagnosis:  Left SI joint pain     Post-operative Diagnosis: Left SI joint pain     Procedure: Left SI joint injection     Procedure Description:  After having signed the informed consent, the patient was placed in the prone position. The patient's back was prepped with chloraprep solution, and draped in a sterile fashion. A total of 2 ml of 1% lidocaine were used to anesthetize the skin and underlying tissues. Under fluoroscopic guidance a single 22-gauge, 3.5 inch spinal needle was advanced to lie within the inferior pole of the left sacroiliac joint. There were no paresthesias or heme aspiration. Needle placement was confirmed in the AP view. After negative aspiration, 0.25 ml of Omnipaque 300 contrast was injected with appropriate spread observed. A total of 4 ml of 0.5% bupivicaine mixed with 40 mg depo-medrol were injected into the sacroiliac joint. The needle was withdrawn without any complications. The patient tolerated the procedure well, was transported to the recovery room and observed for 15 minutes and discharged in an ambulatory fashion. No immediate reported complications.     Procedural Complications: None    IV sedation was used prior to the start of the procedure:  Medications Administered: - 1 mg Versed, 50 mcg Fentanyl  Start Time: 10:42  End Time : 10:57   Total Time: 15 minutes      Teofilo Rosa DO  Interventional Pain Management/PM&R   [place]

## 2020-12-01 NOTE — PROGRESS NOTES
1052- Pt arrived to PACU phase 2, IV sedation, Vi RN at bedside for report. Pt  Hooked up to monitor, VSS, pt breathing deeply on room air. 12-  called states just down the road be  Here in 5 min  1101- IV removed  1104- Pt discharged walked to car with this RN.

## 2021-02-10 ENCOUNTER — OFFICE VISIT (OUTPATIENT)
Dept: PHYSICAL MEDICINE AND REHAB | Age: 76
End: 2021-02-10
Payer: MEDICARE

## 2021-02-10 VITALS
DIASTOLIC BLOOD PRESSURE: 78 MMHG | BODY MASS INDEX: 24.35 KG/M2 | SYSTOLIC BLOOD PRESSURE: 130 MMHG | TEMPERATURE: 97.5 F | HEIGHT: 58 IN | WEIGHT: 116 LBS

## 2021-02-10 DIAGNOSIS — M48.061 SPINAL STENOSIS OF LUMBAR REGION, UNSPECIFIED WHETHER NEUROGENIC CLAUDICATION PRESENT: Primary | ICD-10-CM

## 2021-02-10 DIAGNOSIS — G89.29 CHRONIC LEFT SI JOINT PAIN: ICD-10-CM

## 2021-02-10 DIAGNOSIS — G89.29 OTHER CHRONIC PAIN: ICD-10-CM

## 2021-02-10 DIAGNOSIS — M54.10 RADICULAR LEG PAIN: ICD-10-CM

## 2021-02-10 DIAGNOSIS — M53.3 CHRONIC LEFT SI JOINT PAIN: ICD-10-CM

## 2021-02-10 PROCEDURE — G8420 CALC BMI NORM PARAMETERS: HCPCS | Performed by: ANESTHESIOLOGY

## 2021-02-10 PROCEDURE — 99214 OFFICE O/P EST MOD 30 MIN: CPT | Performed by: ANESTHESIOLOGY

## 2021-02-10 PROCEDURE — 3017F COLORECTAL CA SCREEN DOC REV: CPT | Performed by: ANESTHESIOLOGY

## 2021-02-10 PROCEDURE — 1036F TOBACCO NON-USER: CPT | Performed by: ANESTHESIOLOGY

## 2021-02-10 PROCEDURE — G8482 FLU IMMUNIZE ORDER/ADMIN: HCPCS | Performed by: ANESTHESIOLOGY

## 2021-02-10 PROCEDURE — 1090F PRES/ABSN URINE INCON ASSESS: CPT | Performed by: ANESTHESIOLOGY

## 2021-02-10 PROCEDURE — 1123F ACP DISCUSS/DSCN MKR DOCD: CPT | Performed by: ANESTHESIOLOGY

## 2021-02-10 PROCEDURE — G8427 DOCREV CUR MEDS BY ELIG CLIN: HCPCS | Performed by: ANESTHESIOLOGY

## 2021-02-10 PROCEDURE — G8399 PT W/DXA RESULTS DOCUMENT: HCPCS | Performed by: ANESTHESIOLOGY

## 2021-02-10 PROCEDURE — 4040F PNEUMOC VAC/ADMIN/RCVD: CPT | Performed by: ANESTHESIOLOGY

## 2021-02-10 RX ORDER — GABAPENTIN 300 MG/1
300 CAPSULE ORAL 2 TIMES DAILY
COMMUNITY
End: 2021-04-08 | Stop reason: SDUPTHER

## 2021-02-10 NOTE — PROGRESS NOTES
Chronic Pain Clinic Note     Encounter Date: 2/10/21    Subjective:   Chief Complaint:   Chief Complaint   Patient presents with    Follow-up     f/u after procedure        History of Present Illness:   Mark Moe is a 70 y.o. female seen in the Pain Clinic initially on 11/16/20 upon request from Lemuel Vaca DO (orthopedic surgery) for her history of chronic left groin and posterior thigh pain. She states that her pain started after a fall on ice back in February 2020. She describes her pain as a constant ache in the left buttocks with radiation down the posterior thigh and in to the right groin down the front of the thigh. She states that the pain is typically 4/10 on a day-to-day basis. She states she saw a chiropractor when initially happened which did give her some relief but then her pain returned at the end of April. She denies any focal leg weakness, paresthesias, saddle anesthesia, bowel/bladder incontinence. Her pain comes and goes and she is not sure what exacerbates her pain. She does get relief with the occasional use of Tylenol and tramadol. She states that she did actually suffer a meniscus tear about 2 weeks ago and is slated to have an MRI next week for evaluation. She states she has completed physical therapy for her low back and continues with home core strengthening exercises. Today, 2/10/2021, patient presents for follow-up for left groin and posterior thigh pain. She underwent a left SI joint injection on 12/1/2020. She notes some relief from the injection but this was confounded by an ACL repair on January 13 with Dr. Severo Sensor at [facility]. She continues to endorse left groin pain and was recently started on gabapentin by Dr. Andria Powell which she feels like is helping with the burning pain going down both the legs. She feels like the groin pain is worse when she is ambulating upright. She does note that the left knee will give out from time to time since her surgery.   She denies any new focal leg weakness, new paresthesias, saddle anesthesia, bowel/bladder incontinence. History of Intervention:   Surgery: No previous low back surgeries  Injections: Left SI joint injection (12/1/20) - minimal relief    Current Treatment Medications:   Tylenol PRN  Tramadol 25 mg (every 3rd day)  Gabapentin 300 mg BID    Historical Treatment Medications:   None    Imaging:  MRI L-spine (9/10/20)    PROCEDURE: MRI LUMBAR SPINE WO CONTRAST         CLINICAL INFORMATION: Lumbar radiculopathy. Low back pain radiating into left hip and groin for 7 months after fall.         COMPARISON: Lumbar spine radiographs dated 6/22/2020.         TECHNIQUE: Sagittal and axial T1 and T2-weighted images were obtained through the lumbar spine.         FINDINGS:    There is a dextrocurvature of the lumbar spine, similar to prior radiographs. There is grade 1 anterolisthesis of L4 relative to L5 and grade 2 anterolisthesis of L5 relative to S1 on the basis of degenerative change, also stable compared to prior    radiographs. There is otherwise anatomic vertebral body height and alignment. There is disc space narrowing and mild osteophytosis at T11-12 with hyperintense T1 and T2 signal at the opposing endplates as evidence for Modic 2 degenerative change. Marrow    signal is otherwise within normal limits. The conus terminates in a normal fashion at the L1-2 level. Paraspinal soft tissues are unremarkable. At T12-L1 there is no significant spinal canal or neuroforaminal stenosis. At L1-2 there is no significant spinal canal or neuroforaminal stenosis. At L2-3 there is a shallow disc bulge without significant spinal canal or neuroforaminal stenosis. At L3-4 there is a shallow disc bulge without significant spinal canal or neuroforaminal stenosis.     At L4-5 there is partial uncovering of the disc with superimposed shallow disc bulge which contributes to mild spinal canal stenosis in association with facet 2009    left    BREAST SURGERY  70's    left biopsy    BREAST SURGERY  2009    Lumpectomy of the Left breast     SECTION  1971 and [id number] [facility]  2006    COLONOSCOPY  2004    [facility]. INJECTION PROCEDURE FOR SACROILIAC JOINT Left 2020    left SI joint injection performed by Elisa Andrade DO at [facility]  2004   [address]     SKIN BIOPSY Right 2013    Squemous cell cancer Right leg    SKIN BIOPSY Left 2013    Squemous cell cancer Left leg    SKIN CANCER EXCISION Left 13    Exc SCC Left Lower Leg with flap    TOTAL NEPHRECTOMY  2006    right       Family History   Problem Relation Age of Onset   Erickson Cancer Mother         melanoma, tongue    Hypertension Mother     High Cholesterol Mother     Heart Failure Father        Social History     Socioeconomic History    Marital status:      Spouse name: Not on file    Number of children: Not on file    Years of education: Not on file    Highest education level: Not on file   Occupational History     Employer: [employer] Financial resource strain: Not on file    Food insecurity     Worry: Not on file     Inability: Not on file    Transportation needs     Medical: Not on file     Non-medical: Not on file   Tobacco Use    Smoking status: Never Smoker    Smokeless tobacco: Never Used   Substance and Sexual Activity    Alcohol use: No    Drug use: No    Sexual activity: Not Currently   Lifestyle    Physical activity     Days per week: Not on file     Minutes per session: Not on file    Stress: Not on file   Relationships    Social connections     Talks on phone: Not on file     Gets together: Not on file     Attends Latter-day service: Not on file     Active member of club or organization: Not on file     Attends meetings of clubs or organizations: Not on file     Relationship status: Not on file   Georgina Intimate partner violence     Fear of current or ex partner: Not on file     Emotionally abused: Not on file     Physically abused: Not on file     Forced sexual activity: Not on file   Other Topics Concern    Not on file   Social History Narrative    Not on file       Medications & Allergies:   Current Outpatient Medications   Medication Instructions    acetaminophen (TYLENOL) 1,000 mg, PRN    aspirin 81 mg, DAILY    atorvastatin (LIPITOR) 10 MG tablet TAKE 1 TABLET BY MOUTH EVERY DAY    calcitonin (MIACALCIN) 200 UNIT/ACT nasal spray USE 1 SPRAY IN ONE NOSTRIL ONCE A DAY, ALTERNATING NOSTRILS DAILY    Calcium + D (CALTRATE) 600-200 MG-UNIT tablet 2 tablets, DAILY    gabapentin (NEURONTIN) 300 mg, Oral, 2 TIMES DAILY    lansoprazole (PREVACID SOLUTAB) 15 mg, Oral, DAILY    lidocaine (XYLOCAINE) 5 % ointment Apply topically as needed.     losartan (COZAAR) 25 MG tablet TAKE 1 TABLET BY MOUTH EVERY DAY    therapeutic multivitamin-minerals (THERAGRAN-M) tablet 1 tablet, DAILY    traMADol (ULTRAM) 25 mg, Oral, EVERY 6 HOURS PRN       Allergies   Allergen Reactions    Cipro Xr Swelling and Rash    Dye [Iodides]      Patient only has one kidney       Review of Systems:   Constitutional: negative for weight changes or fevers  Genitourinary: negative for bowel/bladder incontinence   Musculoskeletal: left groin pain  Neurological: Negative for focal leg weakness or numbness/tingling  Behavioral/Psych: positive for anxiety/depression   All other systems reviewed and are negative    Objective:     Vitals:    02/10/21 1031   BP: 130/78   Temp: 97.5 °F (36.4 °C)     Constitutional: Pleasant, no acute distress   Head: Normocephalic, atraumatic   Eyes: Conjunctivae normal   Neck: Supple, symmetrical   Lungs: Normal respiratory effort, non-labored breathing   Cardiovascular: Limbs warm and well perfused   Abdomen: Non-protruded   Musculoskeletal: Muscle bulk symmetric, no atrophy, no gross deformities   · Lumbar Spine: Leg length discrepancy appreciated (left leg 1 inch longer than right). Lumbar paraspinals non-tender bilaterally. SLR neg bilaterally. JOSÉ LUIS equivocal on left. Equivocal FADIR on left. Negative facet loading bilaterally. Neurological: Cranial nerves II-XII grossly intact. · Gait - Antalgic gait. Ambulates with assist device. · Motor: 5/5 muscle strength in bilateral hip flexion, knee flexion, knee extension, dorsiflexion, and plantar flexion   · Sensory: LT sensation intact in lower limbs   Skin: No rashes or lesions visible in low back  Psychological: Cooperative, no exaggerated pain behaviors       Assessment:    Diagnosis Orders   1. Spinal stenosis of lumbar region, unspecified whether neurogenic claudication present  CHG FLUOR NEEDLE/CATH SPINE/PARASPINAL DX/THER ADDON    SD NJX DX/THER SBST INTRLMNR LMBR/SAC W/IMG GDN   2. Radicular leg pain  CHG FLUOR NEEDLE/CATH SPINE/PARASPINAL DX/THER ADDON    SD NJX DX/THER SBST INTRLMNR LMBR/SAC W/IMG GDN   3. Other chronic pain     4. Chronic left SI joint pain         Janes Marie is a 70 y. o.female presenting to the pain clinic for evaluation of chronic left buttocks and anterior groin pain. Her clinical history and physical examination show a mixed pattern of pain that could be potentially coming from the lumbar spine from spinal stenosis versus SI joint dysfunction versus left intrinsic hip pathology. She failed to respond to a left SI joint injection and has continued anterior groin pain. I have set her up for a caudal epidural steroid injection to see if this is potentially coming from the spine particularly from the L4/L5 and L5/S1 level as any interlaminar or transforaminal injection at these levels would be very difficult to access the epidural space. If she fails to respond to the caudal epidural steroid injection, I would likely order a left hip MRI to evaluate for potentially labral tear. Plan:    The following treatment recommendations and plan were discussed in detail with Taim London. Imaging:   I have reviewed patients imaging of MRI L-spine and left hip XR and results were discussed with patient today. Analgesics:   Patient is taking Acetaminophen. Patient informed that the maximum amount of acetaminophen taken on a regular basis should only be 4000 mg per day. Adjuvants:   None    Interventions: With left groin pain, we will proceed with a caudal epidural steroid injection. The risks of this procedure were discussed with the patient. Patient wants to proceed with injection. We will set this up on March 9th with her most recent injection in December. Anticoagulation/NPO Recommendations:   Patient does not need to hold any medications prior to the procedure. Patient will need to be NPO x 8 hours prior to the procedure. We will start an IV prior to the procedure. Multidisciplinary Pain Management:   In the presence of complex, chronic, and multi-factorial pain, the importance of a multidisciplinary approach to pain management in the patients management regimen was emphasized and discussed in great detail.    PHYSICAL THERAPY: I encouraged the patient to continue her core strengthening exercise program    Referrals:  None    Prescriptions Written This Visit:   None    Follow-up: For caudal YAA      Teofilo Rosa, DO  Interventional Pain Management/PM&R   [place]

## 2021-02-28 ENCOUNTER — PATIENT MESSAGE (OUTPATIENT)
Dept: PHYSICAL MEDICINE AND REHAB | Age: 76
End: 2021-02-28

## 2021-03-05 LAB
ABSOLUTE BASO #: 0 X10E9/L (ref 0–0.2)
ABSOLUTE EOS #: 0.4 X10E9/L (ref 0–0.4)
ABSOLUTE LYMPH #: 1.4 X10E9/L (ref 1–3.5)
ABSOLUTE MONO #: 0.4 X10E9/L (ref 0–0.9)
ABSOLUTE NEUT #: 3.6 X10E9/L (ref 1.5–6.6)
ANION GAP SERPL CALCULATED.3IONS-SCNC: 9 MMOL/L (ref 5–15)
BASOPHILS RELATIVE PERCENT: 0.6 %
BUN BLDV-MCNC: 12 MG/DL (ref 5–27)
CALCIUM SERPL-MCNC: 10 MG/DL (ref 8.5–10.5)
CHLORIDE BLD-SCNC: 104 MMOL/L (ref 98–109)
CO2: 27 MMOL/L (ref 22–32)
CREAT SERPL-MCNC: 1.12 MG/DL (ref 0.4–1)
EGFR AFRICAN AMERICAN: 58 ML/MIN/1.73SQ.M
EGFR IF NONAFRICAN AMERICAN: 48 ML/MIN/1.73SQ.M
EOSINOPHILS RELATIVE PERCENT: 6 %
GLUCOSE: 91 MG/DL (ref 65–99)
HCT VFR BLD CALC: 37.6 % (ref 35–47)
HEMOGLOBIN: 12.9 G/DL (ref 11.7–15.5)
LYMPHOCYTE %: 24.5 %
MCH RBC QN AUTO: 30.6 PG (ref 27–34)
MCHC RBC AUTO-ENTMCNC: 34.3 G/DL (ref 32–36)
MCV RBC AUTO: 89 FL (ref 80–100)
MONOCYTES # BLD: 7.2 %
NEUTROPHILS RELATIVE PERCENT: 61.7 %
PDW BLD-RTO: 13.7 % (ref 11.5–15)
PLATELETS: 319 X10E9/L (ref 150–450)
PMV BLD AUTO: 8 FL (ref 7–12)
POTASSIUM SERPL-SCNC: 5.7 MMOL/L (ref 3.5–5)
RBC: 4.2 X10E12/L (ref 3.8–5.2)
SODIUM BLD-SCNC: 140 MMOL/L (ref 134–146)
WBC: 5.9 X10E9/L (ref 4–11)

## 2021-03-08 RX ORDER — ACETAMINOPHEN AND CODEINE PHOSPHATE 300; 30 MG/1; MG/1
TABLET ORAL
COMMUNITY
Start: 2021-01-13 | End: 2021-03-10 | Stop reason: ALTCHOICE

## 2021-03-10 ENCOUNTER — OFFICE VISIT (OUTPATIENT)
Dept: NEPHROLOGY | Age: 76
End: 2021-03-10
Payer: MEDICARE

## 2021-03-10 VITALS
WEIGHT: 124 LBS | TEMPERATURE: 96.8 F | RESPIRATION RATE: 18 BRPM | BODY MASS INDEX: 26.03 KG/M2 | HEART RATE: 107 BPM | DIASTOLIC BLOOD PRESSURE: 74 MMHG | HEIGHT: 58 IN | OXYGEN SATURATION: 97 % | SYSTOLIC BLOOD PRESSURE: 121 MMHG

## 2021-03-10 DIAGNOSIS — N18.32 STAGE 3B CHRONIC KIDNEY DISEASE (HCC): Primary | ICD-10-CM

## 2021-03-10 PROCEDURE — 99214 OFFICE O/P EST MOD 30 MIN: CPT | Performed by: INTERNAL MEDICINE

## 2021-03-10 PROCEDURE — 4040F PNEUMOC VAC/ADMIN/RCVD: CPT | Performed by: INTERNAL MEDICINE

## 2021-03-10 PROCEDURE — 3017F COLORECTAL CA SCREEN DOC REV: CPT | Performed by: INTERNAL MEDICINE

## 2021-03-10 PROCEDURE — 1090F PRES/ABSN URINE INCON ASSESS: CPT | Performed by: INTERNAL MEDICINE

## 2021-03-10 PROCEDURE — 1036F TOBACCO NON-USER: CPT | Performed by: INTERNAL MEDICINE

## 2021-03-10 PROCEDURE — G8482 FLU IMMUNIZE ORDER/ADMIN: HCPCS | Performed by: INTERNAL MEDICINE

## 2021-03-10 PROCEDURE — G8417 CALC BMI ABV UP PARAM F/U: HCPCS | Performed by: INTERNAL MEDICINE

## 2021-03-10 PROCEDURE — G8399 PT W/DXA RESULTS DOCUMENT: HCPCS | Performed by: INTERNAL MEDICINE

## 2021-03-10 PROCEDURE — 1123F ACP DISCUSS/DSCN MKR DOCD: CPT | Performed by: INTERNAL MEDICINE

## 2021-03-10 PROCEDURE — G8427 DOCREV CUR MEDS BY ELIG CLIN: HCPCS | Performed by: INTERNAL MEDICINE

## 2021-03-10 RX ORDER — ALPRAZOLAM 0.25 MG/1
0.25 TABLET ORAL NIGHTLY PRN
COMMUNITY
End: 2021-04-08

## 2021-03-10 NOTE — PROGRESS NOTES
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  Hypertension        History of Chief Complaint: CKD stage G-IIIB-A1 from right nephrectomy done for angiolipoma.        Subjective:  I last saw the patient in clinic 11/11/2020. I follow the patient for Chronic Kidney disease stage G-IIIB-A1. Since our last visit the patient has not been hospitalized. The patient is sleeping well at night with 1-2 times per night nocturia. The patient has a good appetite and is remaining active. The patient denied N/V/C/D/SOB/CP. She has no trouble at bladder emptying. She had the left knee meniscus tear repaired 01/13/2021. She was started on Gabapentin for back pain. It has helped. COVID-19 screening  Fever: none  Cough: none  Exposure: none  Shortness of breath: none    Protein/creatinine ratio:   eGFR: 48 Ml/min (it was 39 Ml/Min last visit)  SCr: 1.12 Mg/Dl (It was 1.58 Mg/Dl last visit)      Last six eGFR readings:  Lab Results   Component Value Date    LABGLOM 44 04/17/2019    LABGLOM 49 04/18/2018    LABGLOM 37 08/11/2014    LABGLOM 41 05/09/2014    LABGLOM 35 05/08/2014          Objective:  VITALS:  /74 (Site: Right Upper Arm, Position: Sitting, Cuff Size: Small Adult)   Pulse 107   Temp 96.8 °F (36 °C)   Resp 18   Ht 4' 10\" (1.473 m)   Wt 124 lb (56.2 kg)   SpO2 97%   BMI 25.92 kg/m²   Weight:   Wt Readings from Last 3 Encounters:   03/10/21 124 lb (56.2 kg)   02/10/21 116 lb (52.6 kg)   12/01/20 116 lb (52.6 kg)     Body mass index is 25.92 kg/m². Physical examination    General:  Alert and cooperative with exam  HEENT:  Normocephalic. No lesions nor rashes. VINCENT. EOMI  Neck:   No JVD and no bruits. Thyroid gland is normal  Lungs:  Breathing easily. No rales nor rhonchi. No cough nor sputum production. Heart[de-identified]            RRR. No murmurs nor rubs. PMI is not enlarged nor displaced. Abdomen:  Soft and non tender. Bowel sounds are active in all four quadrants. Extremities:  no edema  Neurologic:  CN II-XII are intact. No deficits noted. Muscle strength and tone are equal throughout. Skin:                Warm and dry with no rashes. Muscles:         Hand  and leg strength are equal and strong bilaterally.      Lab Data      CBC:   Lab Results   Component Value Date    WBC 5.9 03/04/2021    HGB 12.9 03/04/2021    HCT 37.6 03/04/2021    MCV 89 03/04/2021     03/04/2021     BMP:    Lab Results   Component Value Date     03/04/2021     11/03/2020     05/13/2020    K 5.7 (H) 03/04/2021    K 5.2 (H) 11/03/2020    K 5.3 (H) 05/13/2020     03/04/2021     11/03/2020     05/13/2020    CO2 27 03/04/2021    CO2 26 11/03/2020    CO2 28 05/13/2020    BUN 12 03/04/2021    BUN 19 11/03/2020    BUN 17 05/13/2020    CREATININE 1.12 (H) 03/04/2021    CREATININE 1.33 (H) 11/03/2020    CREATININE 1.20 (H) 05/13/2020    GLUCOSE 91 03/04/2021    GLUCOSE 80 11/03/2020    GLUCOSE Negative 08/13/2020      Hepatic:   Lab Results   Component Value Date    AST 27 05/13/2020    AST 22 04/17/2019    AST 23 02/12/2018    ALT 31 05/13/2020    ALT 16 04/17/2019    ALT 21 02/12/2018    BILITOT Negative 08/13/2020    BILITOT 0.5 05/13/2020    BILITOT 0.5 04/17/2019    ALKPHOS 71 05/13/2020    ALKPHOS 83 04/17/2019    ALKPHOS 78 02/12/2018     BNP: No results found for: BNP  Lipids:   Lab Results   Component Value Date    CHOL 171 05/13/2020    HDL 64 05/13/2020     INR: No results found for: INR  URINE:   Lab Results   Component Value Date    PROTUR Trace 08/13/2020     Lab Results   Component Value Date    NITRU Negative 08/13/2020    COLORU YELLOW 08/13/2020 PHUR 6.5 09/03/2015    RBCUA 0 04/16/2014    YEAST 0 04/16/2014    BACTERIA 0 04/16/2014    CLARITYU Clear 04/16/2014    LEUKOCYTESUR Negative 08/13/2020    UROBILINOGEN <1.1 08/13/2020    BILIRUBINUR NEGATIVE 05/08/2014    BLOODU Negative 09/03/2015    GLUCOSEU neg 09/03/2015    KETUA Negative 08/13/2020      Microalbumen/Creatinine ratio:  No components found for: RUCREAT        Medications:    Current Outpatient Medications   Medication Sig Dispense Refill    ALPRAZolam (XANAX) 0.25 MG tablet Take 0.25 mg by mouth nightly as needed for Sleep.  gabapentin (NEURONTIN) 300 MG capsule Take 300 mg by mouth 2 times daily.  atorvastatin (LIPITOR) 10 MG tablet TAKE 1 TABLET BY MOUTH EVERY DAY 90 tablet 3    losartan (COZAAR) 25 MG tablet TAKE 1 TABLET BY MOUTH EVERY DAY 90 tablet 3    calcitonin (MIACALCIN) 200 UNIT/ACT nasal spray USE 1 SPRAY IN ONE NOSTRIL ONCE A DAY, ALTERNATING NOSTRILS DAILY 11.1 mL 3    lidocaine (XYLOCAINE) 5 % ointment Apply topically as needed. 1 Tube 0    lansoprazole (PREVACID SOLUTAB) 15 MG disintegrating tablet Take 15 mg by mouth daily      acetaminophen (TYLENOL) 500 MG tablet Take 1,000 mg by mouth as needed.  Calcium + D (CALTRATE) 600-200 MG-UNIT tablet Take 2 tablets by mouth daily.  aspirin 81 MG EC tablet Take 81 mg by mouth daily.  therapeutic multivitamin-minerals (THERAGRAN-M) tablet Take 1 tablet by mouth daily.  traMADol (ULTRAM) 50 MG tablet Take 25 mg by mouth every 6 hours as needed for Pain. No current facility-administered medications for this visit. IMPRESSIONS:        Kidney disease: CKD stage G-IIIB-A1  Anemia: Anemia remains stable. No need for an erythrocyte stimulating agent (LUPIS). Bone and mineral metabolism: There is no complaint of bone pain. PLAN:  1. We discussed the eGFR today. 2. We will continue all current medications without changes. 3. We will see the patient back in 6 months. _________________________________  [facility].  Daryn Perales,   [facility]      Michelle Marino MD

## 2021-03-10 NOTE — PATIENT INSTRUCTIONS

## 2021-03-29 ENCOUNTER — TELEPHONE (OUTPATIENT)
Dept: PHYSICAL MEDICINE AND REHAB | Age: 76
End: 2021-03-29

## 2021-03-29 NOTE — H&P
Today, patient presents for planned  caudal epidural steroid injection. This note is reflective of the patient's previous visit for evaluation. We will proceed with today's planned procedure. Since patient's last visit for evaluation, there have been no interval changes in medical history. Patient has no new numbness, weakness, or focal neurological deficit since evaluation. Patient has no contraindications to injection (no anticoagulation, recent antibiotic intake for active infections, allergies to latex / contrast dye / steroid medications), and has a  present. NPO necessity has been assessed and accepted based on procedure complexity. The risks and benefits of the procedure have been explained including but are not limited to infection, bleeding, paralysis, immediate post procedure weakness, and dizziness; the patient acknowledges understanding and desires to proceed with the procedure. Consented for same procedure. All other questions and concerns were addressed at bedside. See procedure note for full details. Vitals:    03/30/21 0830   BP: (!) 164/73   Pulse: 98   Resp: 16   Temp: 97.5 °F (36.4 °C)   SpO2: 98%      Diagnosis Orders   1. Spinal stenosis of lumbar region, unspecified whether neurogenic claudication present  CHG FLUOR NEEDLE/CATH SPINE/PARASPINAL DX/THER ADDON    MO NJX DX/THER SBST INTRLMNR LMBR/SAC W/IMG GDN   2. Radicular leg pain  CHG FLUOR NEEDLE/CATH SPINE/PARASPINAL DX/THER ADDON    MO NJX DX/THER SBST INTRLMNR LMBR/SAC W/IMG GDN   3. Other chronic pain     4. Chronic left SI joint pain         Follow up: 4/28/21     Post procedure Instructions: The patient was advised not to drive during the day of the procedure and not to engage in any significant decision making. The patient was also advised to be cautious with walking/activity for 24 hours following today's visit and asked not to engage in over-exertion. After this time, it is ok to resume pre-procedure level of activity. Patient advised to apply ice to site of injection in situations of pain and discomfort. Patient advised to not submerge site of injection during bath or pool activities for approximately 48 hours post-procedure. Patient attested to understanding post procedure directions / restrictions. All other questions and concerns addressed before patient discharge in ambulatory fashion. Chronic Pain Clinic Note     Encounter Date: 2/10/21    Subjective:   Chief Complaint:   No chief complaint on file. History of Present Illness:   Jagjit Arias is a 78 y.o. female seen in the Pain Clinic initially on 11/16/20 upon request from Ana Chan DO (orthopedic surgery) for her history of chronic left groin and posterior thigh pain. She states that her pain started after a fall on ice back in February 2020. She describes her pain as a constant ache in the left buttocks with radiation down the posterior thigh and in to the right groin down the front of the thigh. She states that the pain is typically 4/10 on a day-to-day basis. She states she saw a chiropractor when initially happened which did give her some relief but then her pain returned at the end of April. She denies any focal leg weakness, paresthesias, saddle anesthesia, bowel/bladder incontinence. Her pain comes and goes and she is not sure what exacerbates her pain. She does get relief with the occasional use of Tylenol and tramadol. She states that she did actually suffer a meniscus tear about 2 weeks ago and is slated to have an MRI next week for evaluation. She states she has completed physical therapy for her low back and continues with home core strengthening exercises. Today, 2/10/2021, patient presents for follow-up for left groin and posterior thigh pain. She underwent a left SI joint injection on 12/1/2020. She notes some relief from the injection but this was confounded by an ACL repair on January 13 with Dr. Dwayne Barry at [facility].   She continues to endorse left groin pain and was recently started on gabapentin by Dr. Renita Brown which she feels like is helping with the burning pain going down both the legs. She feels like the groin pain is worse when she is ambulating upright. She does note that the left knee will give out from time to time since her surgery. She denies any new focal leg weakness, new paresthesias, saddle anesthesia, bowel/bladder incontinence. History of Intervention:   Surgery: No previous low back surgeries  Injections: Left SI joint injection (12/1/20) - minimal relief    Current Treatment Medications:   Tylenol PRN  Tramadol 25 mg (every 3rd day)  Gabapentin 300 mg BID    Historical Treatment Medications:   None    Imaging:  MRI Lspine (9/10/20)    PROCEDURE: MRI LUMBAR SPINE WO CONTRAST         CLINICAL INFORMATION: Lumbar radiculopathy. Low back pain radiating into left hip and groin for 7 months after fall.         COMPARISON: Lumbar spine radiographs dated 6/22/2020.         TECHNIQUE: Sagittal and axial T1 and T2-weighted images were obtained through the lumbar spine.         FINDINGS:    There is a dextrocurvature of the lumbar spine, similar to prior radiographs. There is grade 1 anterolisthesis of L4 relative to L5 and grade 2 anterolisthesis of L5 relative to S1 on the basis of degenerative change, also stable compared to prior    radiographs. There is otherwise anatomic vertebral body height and alignment. There is disc space narrowing and mild osteophytosis at T11-12 with hyperintense T1 and T2 signal at the opposing endplates as evidence for Modic 2 degenerative change. Marrow    signal is otherwise within normal limits. The conus terminates in a normal fashion at the L1-2 level. Paraspinal soft tissues are unremarkable. At T12-L1 there is no significant spinal canal or neuroforaminal stenosis. At L1-2 there is no significant spinal canal or neuroforaminal stenosis.     At L2-3 there is a shallow disc bulge without significant spinal canal or neuroforaminal stenosis. At L3-4 there is a shallow disc bulge without significant spinal canal or neuroforaminal stenosis. At L4-5 there is partial uncovering of the disc with superimposed shallow disc bulge which contributes to mild spinal canal stenosis in association with facet hypertrophy and ligament flavum thickening. There is mild to moderate bilateral neural    foraminal stenosis. At L5-S1 there is partial uncovering of the disc with superimposed shallow disc bulge which contributes to moderate spinal canal stenosis in association with facet hypertrophy and ligamentum flavum thickening. There is flattening of the neural foramina    with moderate bilateral neural foraminal stenosis.           Impression     Multilevel degenerative changes most pronounced at L5-S1 where prominent facet hypertrophy and ligamentum flavum thickening cause grade 2 anterolisthesis of L5 relative to S1 with partial uncovering the disc with superimposed disc bulge contributing to    moderate spinal canal stenosis and moderate bilateral neural foraminal stenosis.                  XR Left Hip (6/2/20)    PROCEDURE: XR HIP LEFT (2-3 VIEWS)       CLINICAL INFORMATION: Left hip pain.       COMPARISON: No prior study.       TECHNIQUE: AP and frog-leg lateral views of the left hip performed.           FINDINGS:    POST OPERATIVE: None.       ALIGNMENT: Anatomic.       MINERALIZATION: Normal.       FRACTURE: None.       HIP JOINT: Unremarkable.       LEFT SACROILIAC JOINT:    1. Mild sclerosis with associated erosive changes along the sacral and iliac margins of the visualized portions of the left sacroiliac joint consistent with a chronic sacroiliitis.       SYMPHYSIS:    1. Mild degenerative sclerosis at the symphysis.        Past Medical History:   Diagnosis Date    Angiomyolipoma     left kidney    Arthritis     hands    Blood transfusion reaction     Cancer (HCC)     left breast    Cancer of skin     Chronic kidney disease     Stage III    GERD (gastroesophageal reflux disease)     Hyperlipidemia     Hypertension     Osteopenia     Osteoporosis     Prolonged emergence from general anesthesia        Past Surgical History:   Procedure Laterality Date    APPENDECTOMY  1971    BREAST LUMPECTOMY  2009    left    BREAST SURGERY  70's    left biopsy    BREAST SURGERY  2009    Lumpectomy of the Left breast     SECTION  1971 and [id number] [facility]  2006    COLONOSCOPY  2004    [facility]. INJECTION PROCEDURE FOR SACROILIAC JOINT Left 2020    left SI joint injection performed by Antoine Landry DO at [address] [facility]     [address] Left     SKIN BIOPSY Right 2013    Squemous cell cancer Right leg    SKIN BIOPSY Left 2013    Squemous cell cancer Left leg    SKIN CANCER EXCISION Left 13    Exc SCC Left Lower Leg with flap    TOTAL NEPHRECTOMY  2006    right       Family History   Problem Relation Age of Onset   [facility] Cancer Mother         melanoma, tongue    Hypertension Mother     High Cholesterol Mother     Heart Failure Father        Social History     Socioeconomic History    Marital status:      Spouse name: Not on file    Number of children: Not on file    Years of education: Not on file    Highest education level: Not on file   Occupational History     Employer: [employer] resource strain: Not on file    Food insecurity     Worry: Not on file     Inability: Not on file    Transportation needs     Medical: Not on file     Non-medical: Not on file   Tobacco Use    Smoking status: Never Smoker    Smokeless tobacco: Never Used   Substance and Sexual Activity    Alcohol use: No    Drug use: No    Sexual activity: Not Currently   Lifestyle    Physical activity     Days per week: Not on file     Minutes per session: Not on file    Stress: Not on file   Relationships    Social connections     Talks on phone: Not on file     Gets together: Not on file     Attends Religion service: Not on file     Active member of club or organization: Not on file     Attends meetings of clubs or organizations: Not on file     Relationship status: Not on file    Intimate partner violence     Fear of current or ex partner: Not on file     Emotionally abused: Not on file     Physically abused: Not on file     Forced sexual activity: Not on file   Other Topics Concern    Not on file   Social History Narrative    Not on file       Medications & Allergies:   Current Outpatient Medications   Medication Instructions    acetaminophen (TYLENOL) 1,000 mg, PRN    ALPRAZolam (XANAX) 0.25 mg, Oral, NIGHTLY PRN    aspirin 81 mg, DAILY    atorvastatin (LIPITOR) 10 MG tablet TAKE 1 TABLET BY MOUTH EVERY DAY    calcitonin (MIACALCIN) 200 UNIT/ACT nasal spray USE 1 SPRAY IN ONE NOSTRIL ONCE A DAY, ALTERNATING NOSTRILS DAILY    Calcium + D (CALTRATE) 600-200 MG-UNIT tablet 2 tablets, DAILY    gabapentin (NEURONTIN) 300 mg, Oral, 2 TIMES DAILY    lansoprazole (PREVACID SOLUTAB) 15 mg, Oral, DAILY    lidocaine (XYLOCAINE) 5 % ointment Apply topically as needed.  losartan (COZAAR) 25 MG tablet TAKE 1 TABLET BY MOUTH EVERY DAY    therapeutic multivitamin-minerals (THERAGRAN-M) tablet 1 tablet, DAILY    traMADol (ULTRAM) 25 mg, Oral, EVERY 6 HOURS PRN       Allergies   Allergen Reactions    Cipro Xr Swelling and Rash    Dye [Iodides]      Patient only has one kidney       Review of Systems:   Constitutional: negative for weight changes or fevers  Genitourinary: negative for bowel/bladder incontinence   Musculoskeletal: left groin pain  Neurological: Negative for focal leg weakness or numbness/tingling  Behavioral/Psych: positive for anxiety/depression   All other systems reviewed and are negative    Objective:      There were no vitals filed for this visit. Constitutional: Pleasant, no acute distress   Head: Normocephalic, atraumatic   Eyes: Conjunctivae normal   Neck: Supple, symmetrical   Lungs: Normal respiratory effort, non-labored breathing   Cardiovascular: Limbs warm and well perfused   Abdomen: Non-protruded   Musculoskeletal: Muscle bulk symmetric, no atrophy, no gross deformities   · Lumbar Spine: Leg length discrepancy appreciated (left leg 1 inch longer than right). Lumbar paraspinals non-tender bilaterally. SLR neg bilaterally. JOSÉ LUIS equivocal on left. Equivocal FADIR on left. Negative facet loading bilaterally. Neurological: Cranial nerves II-XII grossly intact. · Gait - Antalgic gait. Ambulates with assist device. · Motor: 5/5 muscle strength in bilateral hip flexion, knee flexion, knee extension, dorsiflexion, and plantar flexion   · Sensory: LT sensation intact in lower limbs   Skin: No rashes or lesions visible in low back  Psychological: Cooperative, no exaggerated pain behaviors       Assessment:    Diagnosis Orders   1. Spinal stenosis of lumbar region, unspecified whether neurogenic claudication present  CHG FLUOR NEEDLE/CATH SPINE/PARASPINAL DX/THER ADDON    NH NJX DX/THER SBST INTRLMNR LMBR/SAC W/IMG GDN   2. Radicular leg pain  CHG FLUOR NEEDLE/CATH SPINE/PARASPINAL DX/THER ADDON    NH NJX DX/THER SBST INTRLMNR LMBR/SAC W/IMG GDN   3. Other chronic pain     4. Chronic left SI joint pain         Carina Moore is a 78 y. o.female presenting to the pain clinic for evaluation of chronic left buttocks and anterior groin pain. Her clinical history and physical examination show a mixed pattern of pain that could be potentially coming from the lumbar spine from spinal stenosis versus SI joint dysfunction versus left intrinsic hip pathology. She failed to respond to a left SI joint injection and has continued anterior groin pain.   I have set her up for a caudal epidural steroid injection to see if this is potentially coming from the

## 2021-03-30 ENCOUNTER — APPOINTMENT (OUTPATIENT)
Dept: GENERAL RADIOLOGY | Age: 76
End: 2021-03-30
Attending: ANESTHESIOLOGY
Payer: MEDICARE

## 2021-03-30 ENCOUNTER — HOSPITAL ENCOUNTER (OUTPATIENT)
Age: 76
Setting detail: OUTPATIENT SURGERY
Discharge: HOME | End: 2021-03-30
Attending: ANESTHESIOLOGY | Admitting: ANESTHESIOLOGY
Payer: MEDICARE

## 2021-03-30 VITALS
RESPIRATION RATE: 16 BRPM | OXYGEN SATURATION: 94 % | BODY MASS INDEX: 26.24 KG/M2 | DIASTOLIC BLOOD PRESSURE: 79 MMHG | HEART RATE: 98 BPM | SYSTOLIC BLOOD PRESSURE: 139 MMHG | WEIGHT: 125 LBS | HEIGHT: 58 IN | TEMPERATURE: 96.9 F

## 2021-03-30 PROCEDURE — 2709999900 HC NON-CHARGEABLE SUPPLY: Performed by: ANESTHESIOLOGY

## 2021-03-30 PROCEDURE — 7100000011 HC PHASE II RECOVERY - ADDTL 15 MIN: Performed by: ANESTHESIOLOGY

## 2021-03-30 PROCEDURE — 3600000054 HC PAIN LEVEL 3 BASE: Performed by: ANESTHESIOLOGY

## 2021-03-30 PROCEDURE — 62323 NJX INTERLAMINAR LMBR/SAC: CPT | Performed by: ANESTHESIOLOGY

## 2021-03-30 PROCEDURE — 99152 MOD SED SAME PHYS/QHP 5/>YRS: CPT | Performed by: ANESTHESIOLOGY

## 2021-03-30 PROCEDURE — 6360000002 HC RX W HCPCS: Performed by: ANESTHESIOLOGY

## 2021-03-30 PROCEDURE — 7100000010 HC PHASE II RECOVERY - FIRST 15 MIN: Performed by: ANESTHESIOLOGY

## 2021-03-30 PROCEDURE — 2500000003 HC RX 250 WO HCPCS: Performed by: ANESTHESIOLOGY

## 2021-03-30 PROCEDURE — 3209999900 FLUORO FOR SURGICAL PROCEDURES

## 2021-03-30 PROCEDURE — 6360000004 HC RX CONTRAST MEDICATION: Performed by: ANESTHESIOLOGY

## 2021-03-30 RX ORDER — DEXAMETHASONE SODIUM PHOSPHATE 4 MG/ML
INJECTION, SOLUTION INTRA-ARTICULAR; INTRALESIONAL; INTRAMUSCULAR; INTRAVENOUS; SOFT TISSUE PRN
Status: DISCONTINUED | OUTPATIENT
Start: 2021-03-30 | End: 2021-03-30 | Stop reason: ALTCHOICE

## 2021-03-30 RX ORDER — MIDAZOLAM HYDROCHLORIDE 1 MG/ML
INJECTION INTRAMUSCULAR; INTRAVENOUS PRN
Status: DISCONTINUED | OUTPATIENT
Start: 2021-03-30 | End: 2021-03-30 | Stop reason: ALTCHOICE

## 2021-03-30 RX ORDER — FENTANYL CITRATE 50 UG/ML
INJECTION, SOLUTION INTRAMUSCULAR; INTRAVENOUS PRN
Status: DISCONTINUED | OUTPATIENT
Start: 2021-03-30 | End: 2021-03-30 | Stop reason: ALTCHOICE

## 2021-03-30 RX ORDER — LOSARTAN POTASSIUM 25 MG/1
TABLET ORAL
Qty: 90 TABLET | Refills: 1 | Status: SHIPPED | OUTPATIENT
Start: 2021-03-30 | End: 2021-09-27

## 2021-03-30 RX ORDER — LIDOCAINE HYDROCHLORIDE 10 MG/ML
INJECTION, SOLUTION EPIDURAL; INFILTRATION; INTRACAUDAL; PERINEURAL PRN
Status: DISCONTINUED | OUTPATIENT
Start: 2021-03-30 | End: 2021-03-30 | Stop reason: ALTCHOICE

## 2021-03-30 RX ORDER — BACTERIOSTATIC SODIUM CHLORIDE 0.9 %
VIAL (ML) INJECTION PRN
Status: DISCONTINUED | OUTPATIENT
Start: 2021-03-30 | End: 2021-03-30 | Stop reason: ALTCHOICE

## 2021-03-30 RX ORDER — BUPIVACAINE HYDROCHLORIDE 5 MG/ML
INJECTION, SOLUTION PERINEURAL PRN
Status: DISCONTINUED | OUTPATIENT
Start: 2021-03-30 | End: 2021-03-30 | Stop reason: ALTCHOICE

## 2021-03-30 NOTE — PROGRESS NOTES
0616: Patient arrived to Phase II recovery via cart. Awake and alert. Report received from Brady, [facility] [address]: VSS, patient denies pain. HOB elevated and pepsi provided. Call light placed within reach. [id number]: IV removed without complications. Band aid applied to site. Sat edge of bed without difficulty and dressed independently at bedside. 0945: Patient discharged home in stable condition with .

## 2021-03-31 RX ORDER — ATORVASTATIN CALCIUM 10 MG/1
TABLET, FILM COATED ORAL
Qty: 90 TABLET | Refills: 3 | Status: SHIPPED | OUTPATIENT
Start: 2021-03-31 | End: 2022-03-24 | Stop reason: SDUPTHER

## 2021-04-05 ENCOUNTER — TELEPHONE (OUTPATIENT)
Dept: FAMILY MEDICINE CLINIC | Age: 76
End: 2021-04-05

## 2021-04-05 DIAGNOSIS — I10 ESSENTIAL HYPERTENSION: ICD-10-CM

## 2021-04-05 DIAGNOSIS — E78.5 HYPERLIPIDEMIA, UNSPECIFIED HYPERLIPIDEMIA TYPE: Primary | Chronic | ICD-10-CM

## 2021-04-05 NOTE — TELEPHONE ENCOUNTER
1. Has appt with you 4-8-21, no labs pending to be done by you. Would you like some ordered? 2. While she is here, she would like to discuss her mom. Asking if she will need a separate appointment or how that will work? She is willing to pay for your time. Going to [facility] on .    CP[facility]

## 2021-04-06 RX ORDER — CALCITONIN SALMON 200 [IU]/.09ML
SPRAY, METERED NASAL
Qty: 11.1 ML | Refills: 3 | Status: SHIPPED | OUTPATIENT
Start: 2021-04-06 | End: 2021-12-29 | Stop reason: SDUPTHER

## 2021-04-07 LAB
ALBUMIN SERPL-MCNC: 3.8 G/DL (ref 3.2–5.3)
ALK PHOSPHATASE: 82 U/L (ref 39–130)
ALT SERPL-CCNC: 20 U/L (ref 0–31)
AST SERPL-CCNC: 24 U/L (ref 0–41)
BILIRUB SERPL-MCNC: 0.5 MG/DL (ref 0.3–1.2)
BILIRUBIN DIRECT: 0.2 MG/DL (ref 0–0.4)
CHOLESTEROL/HDL RATIO: 2.4 (ref 1–5)
CHOLESTEROL: 153 MG/DL (ref 150–200)
HDLC SERPL-MCNC: 65 MG/DL
LDL CHOLESTEROL CALCULATED: 70 MG/DL
LDL/HDL RATIO: 1.1
TOTAL PROTEIN: 6.7 G/DL (ref 6–8)
TRIGL SERPL-MCNC: 89 MG/DL (ref 27–150)
VLDLC SERPL CALC-MCNC: 18 MG/DL (ref 0–30)

## 2021-04-08 ENCOUNTER — OFFICE VISIT (OUTPATIENT)
Dept: FAMILY MEDICINE CLINIC | Age: 76
End: 2021-04-08
Payer: MEDICARE

## 2021-04-08 ENCOUNTER — TELEPHONE (OUTPATIENT)
Dept: FAMILY MEDICINE CLINIC | Age: 76
End: 2021-04-08

## 2021-04-08 VITALS
HEIGHT: 59 IN | HEART RATE: 84 BPM | DIASTOLIC BLOOD PRESSURE: 70 MMHG | WEIGHT: 123 LBS | SYSTOLIC BLOOD PRESSURE: 110 MMHG | BODY MASS INDEX: 24.8 KG/M2 | RESPIRATION RATE: 16 BRPM | TEMPERATURE: 97.6 F

## 2021-04-08 DIAGNOSIS — I10 ESSENTIAL HYPERTENSION: ICD-10-CM

## 2021-04-08 DIAGNOSIS — F41.9 ANXIETY: ICD-10-CM

## 2021-04-08 DIAGNOSIS — M48.061 SPINAL STENOSIS OF LUMBAR REGION, UNSPECIFIED WHETHER NEUROGENIC CLAUDICATION PRESENT: Primary | ICD-10-CM

## 2021-04-08 PROBLEM — M46.1 SACROILIITIS (HCC): Status: ACTIVE | Noted: 2021-04-08

## 2021-04-08 PROBLEM — M46.1 SACROILIITIS (HCC): Status: RESOLVED | Noted: 2021-04-08 | Resolved: 2021-04-08

## 2021-04-08 PROCEDURE — 3017F COLORECTAL CA SCREEN DOC REV: CPT | Performed by: FAMILY MEDICINE

## 2021-04-08 PROCEDURE — G8417 CALC BMI ABV UP PARAM F/U: HCPCS | Performed by: FAMILY MEDICINE

## 2021-04-08 PROCEDURE — G8427 DOCREV CUR MEDS BY ELIG CLIN: HCPCS | Performed by: FAMILY MEDICINE

## 2021-04-08 PROCEDURE — G8399 PT W/DXA RESULTS DOCUMENT: HCPCS | Performed by: FAMILY MEDICINE

## 2021-04-08 PROCEDURE — 1036F TOBACCO NON-USER: CPT | Performed by: FAMILY MEDICINE

## 2021-04-08 PROCEDURE — 1123F ACP DISCUSS/DSCN MKR DOCD: CPT | Performed by: FAMILY MEDICINE

## 2021-04-08 PROCEDURE — 99214 OFFICE O/P EST MOD 30 MIN: CPT | Performed by: FAMILY MEDICINE

## 2021-04-08 PROCEDURE — 1090F PRES/ABSN URINE INCON ASSESS: CPT | Performed by: FAMILY MEDICINE

## 2021-04-08 PROCEDURE — 4040F PNEUMOC VAC/ADMIN/RCVD: CPT | Performed by: FAMILY MEDICINE

## 2021-04-08 RX ORDER — GABAPENTIN 300 MG/1
300 CAPSULE ORAL 2 TIMES DAILY
Qty: 90 CAPSULE | Refills: 1 | Status: SHIPPED | OUTPATIENT
Start: 2021-04-08 | End: 2021-04-08 | Stop reason: SDUPTHER

## 2021-04-08 RX ORDER — ALPRAZOLAM 0.25 MG/1
0.25 TABLET ORAL NIGHTLY PRN
Qty: 90 TABLET | Refills: 0 | Status: SHIPPED | OUTPATIENT
Start: 2021-04-08 | End: 2021-07-07

## 2021-04-08 RX ORDER — GABAPENTIN 300 MG/1
300 CAPSULE ORAL 2 TIMES DAILY
Qty: 180 CAPSULE | Refills: 1 | Status: SHIPPED | OUTPATIENT
Start: 2021-04-08 | End: 2021-08-20

## 2021-04-08 ASSESSMENT — PATIENT HEALTH QUESTIONNAIRE - PHQ9
1. LITTLE INTEREST OR PLEASURE IN DOING THINGS: 0
SUM OF ALL RESPONSES TO PHQ QUESTIONS 1-9: 0
SUM OF ALL RESPONSES TO PHQ QUESTIONS 1-9: 0
SUM OF ALL RESPONSES TO PHQ9 QUESTIONS 1 & 2: 0

## 2021-04-10 ASSESSMENT — ENCOUNTER SYMPTOMS
SHORTNESS OF BREATH: 0
DIARRHEA: 0
NAUSEA: 0
SORE THROAT: 0
ABDOMINAL PAIN: 0
EYES NEGATIVE: 1
VOMITING: 0
RHINORRHEA: 0
BACK PAIN: 1
CHEST TIGHTNESS: 0
COUGH: 0

## 2021-04-10 NOTE — PROGRESS NOTES
[address] [place] [facility] [address]  Dept: [phone]  Dept Fax: [phone]  Loc: [phone]  PROGRESS NOTE      VisitDate: 4/8/2021    Mateo Parson is a 78 y.o. female who presents today for:     Chief Complaint   Patient presents with    Check-Up     HTN, Hyperlipidemia, Anxiety    Discuss Labs    Discuss Medications     prescribe gabapentin (originally prescribed )         Subjective:  HPI  Patient history of hypertension and anxiety comes in follow-up regarding her spinal stenosis. Blood pressure has been stable and well-controlled. Anxiety stable with infrequent use of alprazolam.  She does have back problems taking gabapentin. Discussed potential of furthering intervention surgery versus injections. Review of Systems   Constitutional: Negative for activity change, appetite change, fatigue and fever. HENT: Negative for congestion, rhinorrhea and sore throat. Eyes: Negative. Respiratory: Negative for cough, chest tightness and shortness of breath. Cardiovascular: Negative for chest pain and palpitations. Gastrointestinal: Negative for abdominal pain, diarrhea, nausea and vomiting. Genitourinary: Negative for dysuria and urgency. Musculoskeletal: Positive for back pain. Negative for arthralgias. Neurological: Negative for dizziness and headaches. Psychiatric/Behavioral: Negative for dysphoric mood. The patient is not nervous/anxious.       Past Medical History:   Diagnosis Date    Angiomyolipoma     left kidney    Arthritis     hands    Blood transfusion reaction     Cancer (HCC)     left breast    Cancer of skin     Chronic kidney disease     Stage III    GERD (gastroesophageal reflux disease)     Hyperlipidemia     Hypertension     Osteopenia     Osteoporosis     Prolonged emergence from general anesthesia       Past Surgical History:   Procedure Laterality Date    APPENDECTOMY 1971    BREAST LUMPECTOMY  2009    left    BREAST SURGERY  70's    left biopsy    BREAST SURGERY  2009    Lumpectomy of the Left breast   [address]  2006    COLONOSCOPY  [address] [place] [address]. INJECTION PROCEDURE FOR SACROILIAC JOINT Left 12/1/2020    left SI joint injection performed by Krystina Savage DO at [address]  2004   [address] Left 2021    PAIN MANAGEMENT PROCEDURE N/A 3/30/2021    caudal epidural steroid injection performed by Krystina Savage DO at [facility] Right 06/2013    Squemous cell cancer Right leg    SKIN BIOPSY Left 12/02/2013    Squemous cell cancer Left leg    SKIN CANCER EXCISION Left 12/2/13    Exc SCC Left Lower Leg with flap    TOTAL NEPHRECTOMY  2006    right     Family History   Problem Relation Age of Onset   [employer] Cancer Mother         melanoma, tongue    Hypertension Mother     High Cholesterol Mother     Heart Failure Father      Social History     Tobacco Use    Smoking status: Never Smoker    Smokeless tobacco: Never Used   Substance Use Topics    Alcohol use: No      Current Outpatient Medications   Medication Sig Dispense Refill    ALPRAZolam (XANAX) 0.25 MG tablet Take 1 tablet by mouth nightly as needed for Sleep or Anxiety for up to 90 days. 90 tablet 0    gabapentin (NEURONTIN) 300 MG capsule Take 1 capsule by mouth 2 times daily for 180 days. 180 capsule 1    calcitonin (MIACALCIN) 200 UNIT/ACT nasal spray PLACE 1 SPRAY IN ONE NOSTRIL ONCE DAILY ALTERNATING NOSTRILS DAILY 11.1 mL 3    atorvastatin (LIPITOR) 10 MG tablet TAKE 1 TABLET BY MOUTH EVERY DAY 90 tablet 3    losartan (COZAAR) 25 MG tablet TAKE 1 TABLET BY MOUTH EVERY DAY 90 tablet 1    traMADol (ULTRAM) 50 MG tablet Take 25 mg by mouth every 6 hours as needed for Pain.       lansoprazole (PREVACID SOLUTAB) 15 MG disintegrating tablet Take 15 mg by mouth daily  acetaminophen (TYLENOL) 500 MG tablet Take 500 mg by mouth as needed       Calcium + D (CALTRATE) 600-200 MG-UNIT tablet Take 2 tablets by mouth daily.  aspirin 81 MG EC tablet Take 81 mg by mouth daily.  therapeutic multivitamin-minerals (THERAGRAN-M) tablet Take 1 tablet by mouth daily. No current facility-administered medications for this visit. Allergies   Allergen Reactions    Cipro Xr Swelling and Rash    Dye [Iodides]      Patient only has one kidney     Health Maintenance   Topic Date Due    DTaP/Tdap/Td vaccine (1 - Tdap) 03/20/1966    Shingles Vaccine (2 of 3) 06/12/2015    Annual Wellness Visit (AWV)  08/28/2021    Potassium monitoring  03/04/2022    Creatinine monitoring  03/04/2022    Lipid screen  04/06/2022    Breast cancer screen  05/12/2022    Colon cancer screen colonoscopy  04/24/2025    DEXA (modify frequency per FRAX score)  Completed    Flu vaccine  Completed    Pneumococcal 65+ years Vaccine  Completed    COVID-19 Vaccine  Completed    Hepatitis C screen  Completed    Hepatitis A vaccine  Aged Out    Hepatitis B vaccine  Aged Out    Hib vaccine  Aged Out    Meningococcal (ACWY) vaccine  Aged Out         Objective:     Physical Exam  Constitutional:       General: She is not in acute distress. Appearance: She is well-developed. She is not diaphoretic. HENT:      Head: Normocephalic and atraumatic. Eyes:      General: No scleral icterus. Conjunctiva/sclera: Conjunctivae normal.   Neck:      Thyroid: No thyromegaly. Vascular: No JVD. Comments: No bruits  Cardiovascular:      Rate and Rhythm: Normal rate and regular rhythm. Heart sounds: Normal heart sounds. Pulmonary:      Effort: Pulmonary effort is normal. No respiratory distress. Breath sounds: Normal breath sounds. No wheezing or rales. Abdominal:      Palpations: Abdomen is soft. There is no mass. Tenderness: There is no abdominal tenderness.  There is no guarding. Musculoskeletal:         General: No tenderness. Skin:     General: Skin is warm and dry. Findings: No rash. Neurological:      Mental Status: She is alert and oriented to person, place, and time. Cranial Nerves: No cranial nerve deficit. /70   Pulse 84   Temp 97.6 °F (36.4 °C) (Oral)   Resp 16   Ht 4' 10.5\" (1.486 m)   Wt 123 lb (55.8 kg)   BMI 25.27 kg/m²       Impression/Plan:  1. Spinal stenosis of lumbar region, unspecified whether neurogenic claudication present    2. Anxiety    3. Essential hypertension      Requested Prescriptions     Signed Prescriptions Disp Refills    ALPRAZolam (XANAX) 0.25 MG tablet 90 tablet 0     Sig: Take 1 tablet by mouth nightly as needed for Sleep or Anxiety for up to 90 days.  gabapentin (NEURONTIN) 300 MG capsule 180 capsule 1     Sig: Take 1 capsule by mouth 2 times daily for 180 days. No orders of the defined types were placed in this encounter. Over 35 minutes face-to-face time attention the time was spent on counseling regarding treatment options for her back issues as well as ongoing issues with her mother who is in the nursing home and is felt to have a rectovaginal fistula. She is over 80years old and she is concerned about them sending her for surgery for this and what may happen if they do not do surgery. She also reports that her dementia has been worsening. We discussed quality versus quantity regarding her medical care. Patient giveneducational materials - see patient instructions. Discussed use, benefit, and side effects of prescribed medications. All patient questions answered. Pt voiced understanding. Reviewed health maintenance. Patient agreedwith treatment plan. Follow up as directed. **This report has been created using voice recognition software. It may contain minor errorswhich are inherent in voice recognition technology. **       Electronically signed by Huang Tate MD on 4/10/2021 at 11:05 AM

## 2021-05-17 ENCOUNTER — OFFICE VISIT (OUTPATIENT)
Dept: PHYSICAL MEDICINE AND REHAB | Age: 76
End: 2021-05-17
Payer: MEDICARE

## 2021-05-17 VITALS
WEIGHT: 124.6 LBS | SYSTOLIC BLOOD PRESSURE: 128 MMHG | DIASTOLIC BLOOD PRESSURE: 74 MMHG | BODY MASS INDEX: 25.12 KG/M2 | HEIGHT: 59 IN

## 2021-05-17 DIAGNOSIS — M54.10 RADICULAR LEG PAIN: ICD-10-CM

## 2021-05-17 DIAGNOSIS — M48.061 SPINAL STENOSIS OF LUMBAR REGION, UNSPECIFIED WHETHER NEUROGENIC CLAUDICATION PRESENT: ICD-10-CM

## 2021-05-17 DIAGNOSIS — G89.29 OTHER CHRONIC PAIN: ICD-10-CM

## 2021-05-17 DIAGNOSIS — M25.552 LEFT HIP PAIN: Primary | ICD-10-CM

## 2021-05-17 PROCEDURE — 1036F TOBACCO NON-USER: CPT | Performed by: ANESTHESIOLOGY

## 2021-05-17 PROCEDURE — 99214 OFFICE O/P EST MOD 30 MIN: CPT | Performed by: ANESTHESIOLOGY

## 2021-05-17 PROCEDURE — 4040F PNEUMOC VAC/ADMIN/RCVD: CPT | Performed by: ANESTHESIOLOGY

## 2021-05-17 PROCEDURE — 1123F ACP DISCUSS/DSCN MKR DOCD: CPT | Performed by: ANESTHESIOLOGY

## 2021-05-17 PROCEDURE — G8417 CALC BMI ABV UP PARAM F/U: HCPCS | Performed by: ANESTHESIOLOGY

## 2021-05-17 PROCEDURE — G8427 DOCREV CUR MEDS BY ELIG CLIN: HCPCS | Performed by: ANESTHESIOLOGY

## 2021-05-17 PROCEDURE — 1090F PRES/ABSN URINE INCON ASSESS: CPT | Performed by: ANESTHESIOLOGY

## 2021-05-17 PROCEDURE — 3017F COLORECTAL CA SCREEN DOC REV: CPT | Performed by: ANESTHESIOLOGY

## 2021-05-17 PROCEDURE — G8399 PT W/DXA RESULTS DOCUMENT: HCPCS | Performed by: ANESTHESIOLOGY

## 2021-05-24 ENCOUNTER — TELEPHONE (OUTPATIENT)
Dept: PHYSICAL MEDICINE AND REHAB | Age: 76
End: 2021-05-24

## 2021-05-24 NOTE — H&P
Note     Encounter Date: 5/17/21    Subjective:   Chief Complaint:   No chief complaint on file. History of Present Illness:   Olayinka Duran is a 78 y.o. female seen in the clinic initially on 11/16/20 upon request from Yosef Gusman DO (orthopedic surgery) for her history of chronic left groin and posterior thigh pain. She states that her pain started after a fall on ice back in February 2020. She describes her pain as a constant ache in the left buttocks with radiation down the posterior thigh and in to the right groin down the front of the thigh. She states that the pain is typically 4/10 on a day-to-day basis. She states she saw a chiropractor when initially happened which did give her some relief but then her pain returned at the end of April. She denies any focal leg weakness, paresthesias, saddle anesthesia, bowel/bladder incontinence. Her pain comes and goes and she is not sure what exacerbates her pain. She does get relief with the occasional use of Tylenol and tramadol. She states that she did actually suffer a meniscus tear about 2 weeks ago and is slated to have an MRI next week for evaluation. She states she has completed physical therapy for her low back and continues with home core strengthening exercises. Today, 5/17/2021, patient presents for planned follow-up for left groin/hip pain. She underwent a caudal epidural steroid injection on 3/30/2021. She feels like she obtained significant relief in her left groin and leg pain after her injection. She feels like her left hip is still bothersome. She feels like the pain is still on the posterior aspects of the buttocks with radiation around her lateral hip and into the groin at times. She is any new symptoms of focal leg weakness, leg paresthesias, saddle anesthesia, bowel/bladder incontinence. She continues to take her gabapentin and tramadol intermittently to help with her pain.   She is wondering if there is something wrong with the hip itself. History of Intervention:   Surgery: No previous low back surgeries  Injections: Left SI joint injection (12/1/20) - minimal relief  Caudal YAA (3/30/2021)-greater than 50% pain relief x 6 weeks    Current Treatment Medications:   Tylenol PRN  Tramadol 25 mg (every 3rd day)  Gabapentin 300 mg BID    Historical Treatment Medications:   None    Imaging:  MRI L-spine (9/10/20)    PROCEDURE: MRI LUMBAR SPINE WO CONTRAST         CLINICAL INFORMATION: Lumbar radiculopathy. Low back pain radiating into left hip and groin for 7 months after fall.         COMPARISON: Lumbar spine radiographs dated 6/22/2020.         TECHNIQUE: Sagittal and axial T1 and T2-weighted images were obtained through the lumbar spine.         FINDINGS:    There is a dextrocurvature of the lumbar spine, similar to prior radiographs. There is grade 1 anterolisthesis of L4 relative to L5 and grade 2 anterolisthesis of L5 relative to S1 on the basis of degenerative change, also stable compared to prior    radiographs. There is otherwise anatomic vertebral body height and alignment. There is disc space narrowing and mild osteophytosis at T11-12 with hyperintense T1 and T2 signal at the opposing endplates as evidence for Modic 2 degenerative change. Marrow    signal is otherwise within normal limits. The conus terminates in a normal fashion at the L1-2 level. Paraspinal soft tissues are unremarkable. At T12-L1 there is no significant spinal canal or neuroforaminal stenosis. At L1-2 there is no significant spinal canal or neuroforaminal stenosis. At L2-3 there is a shallow disc bulge without significant spinal canal or neuroforaminal stenosis. At L3-4 there is a shallow disc bulge without significant spinal canal or neuroforaminal stenosis.     At L4-5 there is partial uncovering of the disc with superimposed shallow disc bulge which contributes to mild spinal canal stenosis in association with facet hypertrophy and ligament flavum thickening. There is mild to moderate bilateral neural    foraminal stenosis. At L5-S1 there is partial uncovering of the disc with superimposed shallow disc bulge which contributes to moderate spinal canal stenosis in association with facet hypertrophy and ligamentum flavum thickening. There is flattening of the neural foramina    with moderate bilateral neural foraminal stenosis.           Impression     Multilevel degenerative changes most pronounced at L5-S1 where prominent facet hypertrophy and ligamentum flavum thickening cause grade 2 anterolisthesis of L5 relative to S1 with partial uncovering the disc with superimposed disc bulge contributing to    moderate spinal canal stenosis and moderate bilateral neural foraminal stenosis.                  XR Left Hip (6/2/20)    PROCEDURE: XR HIP LEFT (2-3 VIEWS)       CLINICAL INFORMATION: Left hip pain.       COMPARISON: No prior study.       TECHNIQUE: AP and frog-leg lateral views of the left hip performed.           FINDINGS:    POST OPERATIVE: None.       ALIGNMENT: Anatomic.       MINERALIZATION: Normal.       FRACTURE: None.       HIP JOINT: Unremarkable.       LEFT SACROILIAC JOINT:    1. Mild sclerosis with associated erosive changes along the sacral and iliac margins of the visualized portions of the left sacroiliac joint consistent with a chronic sacroiliitis.       SYMPHYSIS:    1. Mild degenerative sclerosis at the symphysis.        Past Medical History:   Diagnosis Date    Angiomyolipoma     left kidney    Arthritis     hands    Blood transfusion reaction     Cancer (HCC)     left breast    Cancer of skin     Chronic kidney disease     Stage III    GERD (gastroesophageal reflux disease)     Hyperlipidemia     Hypertension     Osteopenia     Osteoporosis     Prolonged emergence from general anesthesia        Past Surgical History:   Procedure Laterality Date    APPENDECTOMY  1971    BREAST LUMPECTOMY  2009    left  BREAST SURGERY  70's    left biopsy    BREAST SURGERY  2009    Lumpectomy of the Left breast     SECTION  1971 and [id number] [facility]  2006    COLONOSCOPY  2004    [facility]. INJECTION PROCEDURE FOR SACROILIAC JOINT Left 2020    left SI joint injection performed by Saba Thomas DO at [address] [facility] [address]  2004   [address] Left     PAIN MANAGEMENT PROCEDURE N/A 3/30/2021    caudal epidural steroid injection performed by Saba Thomas DO at [facility] Right 2013    Squemous cell cancer Right leg    SKIN BIOPSY Left 2013    Squemous cell cancer Left leg    SKIN CANCER EXCISION Left 13    Exc SCC Left Lower Leg with flap    TOTAL NEPHRECTOMY  2006    right       Family History   Problem Relation Age of Onset    Cancer Mother         melanoma, tongue    Hypertension Mother     High Cholesterol Mother     Heart Failure Father        Social History     Socioeconomic History    Marital status:      Spouse name: Not on file    Number of children: Not on file    Years of education: Not on file    Highest education level: Not on file   Occupational History     Employer: [employer]   Tobacco Use    Smoking status: Never Smoker    Smokeless tobacco: Never Used   Vaping Use    Vaping Use: Never used   Substance and Sexual Activity    Alcohol use: No    Drug use: No    Sexual activity: Not Currently   Other Topics Concern    Not on file   Social History Narrative    Not on file     Social Determinants of Health     Financial Resource Strain:     Difficulty of Paying Living Expenses:    Food Insecurity:     Worried About Running Out of Food in the Last Year:     Ran Out of Food in the Last Year:    Transportation Needs:     Lack of Transportation (Medical):      Lack of Transportation (Non-Medical):    Physical Activity:     Days of Exercise per Week:  Minutes of Exercise per Session:    Stress:     Feeling of Stress :    Social Connections:     Frequency of Communication with Friends and Family:     Frequency of Social Gatherings with Friends and Family:     Attends Orthodoxy Services:     Active Member of Clubs or Organizations:     Attends Club or Organization Meetings:     Marital Status:    Intimate Partner Violence:     Fear of Current or Ex-Partner:     Emotionally Abused:     Physically Abused:     Sexually Abused:        Medications & Allergies:   Current Outpatient Medications   Medication Instructions    acetaminophen (TYLENOL) 500 mg, Oral, PRN    ALPRAZolam (XANAX) 0.25 mg, Oral, NIGHTLY PRN    aspirin 81 mg, DAILY    atorvastatin (LIPITOR) 10 MG tablet TAKE 1 TABLET BY MOUTH EVERY DAY    calcitonin (MIACALCIN) 200 UNIT/ACT nasal spray PLACE 1 SPRAY IN ONE NOSTRIL ONCE DAILY ALTERNATING NOSTRILS DAILY    Calcium + D (CALTRATE) 600-200 MG-UNIT tablet 2 tablets, DAILY    gabapentin (NEURONTIN) 300 mg, Oral, 2 TIMES DAILY    lansoprazole (PREVACID SOLUTAB) 15 mg, Oral, DAILY    losartan (COZAAR) 25 MG tablet TAKE 1 TABLET BY MOUTH EVERY DAY    therapeutic multivitamin-minerals (THERAGRAN-M) tablet 1 tablet, DAILY    traMADol (ULTRAM) 25 mg, Oral, EVERY 6 HOURS PRN       Allergies   Allergen Reactions    Cipro Xr Swelling and Rash    Dye [Iodides]      Patient only has one kidney       Review of Systems:   Constitutional: negative for weight changes or fevers  Genitourinary: negative for bowel/bladder incontinence   Musculoskeletal: left groin pain/hip pain  Neurological: Negative for focal leg weakness or numbness/tingling  Behavioral/Psych: positive for anxiety/depression   All other systems reviewed and are negative    Objective: There were no vitals filed for this visit.   Constitutional: Pleasant, no acute distress   Head: Normocephalic, atraumatic   Eyes: Conjunctivae normal   Neck: Supple, symmetrical   Lungs: Normal respiratory effort, non-labored breathing   Cardiovascular: Limbs warm and well perfused   Abdomen: Non-protruded   Musculoskeletal: Muscle bulk symmetric, no atrophy, no gross deformities   · Lumbar Spine: Leg length discrepancy appreciated (left leg 1 inch longer than right). Lumbar paraspinals non-tender bilaterally. SLR neg bilaterally. JOSÉ LUIS equivocal on left. Positive FADIR on left. Negative facet loading bilaterally. Neurological: Cranial nerves II-XII grossly intact. · Gait - Antalgic gait. Ambulates with assist device. · Motor: No focal motor deficit appreciated lower limbs  · Sensory: LT sensation intact in lower limbs   Skin: No rashes or lesions visible in low back  Psychological: Cooperative, no exaggerated pain behaviors       Assessment:    Diagnosis Orders   1. Left hip pain  CHG FLUOR NEEDLE/CATH SPINE/PARASPINAL DX/THER ADDON    WA ARTHROCENTESIS ASPIR&/INJ MAJOR JT/BURSA W/O US   2. Spinal stenosis of lumbar region, unspecified whether neurogenic claudication present     3. Other chronic pain     4. Radicular leg pain         Angela Bauer is a 78 y. o.female presenting to the pain clinic for evaluation of chronic left buttocks and anterior groin pain. Her clinical history and physical examination show a mixed pattern of pain that could be potentially coming from the lumbar spine from spinal stenosis versus SI joint dysfunction versus left intrinsic hip pathology. She failed to respond to a left SI joint injection and has continued anterior groin pain. I have set her up for a caudal epidural steroid injection to see if this is potentially coming from the spine particularly from the L4/L5 and L5/S1 level as any interlaminar or transforaminal injection at these levels would be very difficult to access the epidural space. She responded somewhat to a caudal epidural steroid injection however has continued left hip pain.   I have set her up for a left intra-articular hip injection under fluoroscopy. She may potentially have a labral tear contributing to her pain. Plan: The following treatment recommendations and plan were discussed in detail with Xavier Ambrose. Imaging:   I have reviewed patients imaging of MRI L-spine and left hip XR and results were discussed with patient today. Analgesics:   Patient is taking Acetaminophen. Patient informed that the maximum amount of acetaminophen taken on a regular basis should only be 4000 mg per day. Adjuvants:   None    Interventions: With clinical history and physical examination consistent with left intra-articular hip pathology, I have set the patient up for a left intra-articular hip injection under fluoroscopy. Patient is agreement wants to proceed with this procedure. Anticoagulation/NPO Recommendations:   Patient does not need to hold any medications prior to the procedure. Patient will need to be NPO x 8 hours prior to the procedure. We will start an IV prior to the procedure. Multidisciplinary Pain Management:   In the presence of complex, chronic, and multi-factorial pain, the importance of a multidisciplinary approach to pain management in the patients management regimen was emphasized and discussed in great detail.    PHYSICAL THERAPY: I encouraged the patient to continue her core strengthening exercise program    Referrals:  None    Prescriptions Written This Visit:   None    Follow-up: For left intra-articular hip injection      Eden Hooper,   Interventional Pain Management/PM&R   [place]

## 2021-05-25 ENCOUNTER — HOSPITAL ENCOUNTER (OUTPATIENT)
Age: 76
Setting detail: OUTPATIENT SURGERY
Discharge: HOME | End: 2021-05-25
Attending: ANESTHESIOLOGY | Admitting: ANESTHESIOLOGY
Payer: MEDICARE

## 2021-05-25 ENCOUNTER — APPOINTMENT (OUTPATIENT)
Dept: GENERAL RADIOLOGY | Age: 76
End: 2021-05-25
Attending: ANESTHESIOLOGY
Payer: MEDICARE

## 2021-05-25 VITALS
SYSTOLIC BLOOD PRESSURE: 130 MMHG | DIASTOLIC BLOOD PRESSURE: 79 MMHG | WEIGHT: 121.4 LBS | OXYGEN SATURATION: 91 % | HEIGHT: 58 IN | TEMPERATURE: 97 F | RESPIRATION RATE: 14 BRPM | BODY MASS INDEX: 25.48 KG/M2 | HEART RATE: 98 BPM

## 2021-05-25 PROCEDURE — 7100000010 HC PHASE II RECOVERY - FIRST 15 MIN: Performed by: ANESTHESIOLOGY

## 2021-05-25 PROCEDURE — 6360000002 HC RX W HCPCS: Performed by: ANESTHESIOLOGY

## 2021-05-25 PROCEDURE — 3600000054 HC PAIN LEVEL 3 BASE: Performed by: ANESTHESIOLOGY

## 2021-05-25 PROCEDURE — 7100000011 HC PHASE II RECOVERY - ADDTL 15 MIN: Performed by: ANESTHESIOLOGY

## 2021-05-25 PROCEDURE — 20610 DRAIN/INJ JOINT/BURSA W/O US: CPT | Performed by: ANESTHESIOLOGY

## 2021-05-25 PROCEDURE — 99152 MOD SED SAME PHYS/QHP 5/>YRS: CPT | Performed by: ANESTHESIOLOGY

## 2021-05-25 PROCEDURE — 3209999900 FLUORO FOR SURGICAL PROCEDURES

## 2021-05-25 PROCEDURE — 2500000003 HC RX 250 WO HCPCS: Performed by: ANESTHESIOLOGY

## 2021-05-25 PROCEDURE — 6360000004 HC RX CONTRAST MEDICATION: Performed by: ANESTHESIOLOGY

## 2021-05-25 RX ORDER — BUPIVACAINE HYDROCHLORIDE 5 MG/ML
INJECTION, SOLUTION PERINEURAL PRN
Status: DISCONTINUED | OUTPATIENT
Start: 2021-05-25 | End: 2021-05-25 | Stop reason: ALTCHOICE

## 2021-05-25 RX ORDER — METHYLPREDNISOLONE ACETATE 40 MG/ML
INJECTION, SUSPENSION INTRA-ARTICULAR; INTRALESIONAL; INTRAMUSCULAR; SOFT TISSUE PRN
Status: DISCONTINUED | OUTPATIENT
Start: 2021-05-25 | End: 2021-05-25 | Stop reason: ALTCHOICE

## 2021-05-25 RX ORDER — MIDAZOLAM HYDROCHLORIDE 1 MG/ML
INJECTION INTRAMUSCULAR; INTRAVENOUS PRN
Status: DISCONTINUED | OUTPATIENT
Start: 2021-05-25 | End: 2021-05-25 | Stop reason: ALTCHOICE

## 2021-05-25 RX ORDER — LIDOCAINE HYDROCHLORIDE 10 MG/ML
INJECTION, SOLUTION EPIDURAL; INFILTRATION; INTRACAUDAL; PERINEURAL PRN
Status: DISCONTINUED | OUTPATIENT
Start: 2021-05-25 | End: 2021-05-25 | Stop reason: ALTCHOICE

## 2021-05-25 RX ORDER — FENTANYL CITRATE 50 UG/ML
INJECTION, SOLUTION INTRAMUSCULAR; INTRAVENOUS PRN
Status: DISCONTINUED | OUTPATIENT
Start: 2021-05-25 | End: 2021-05-25 | Stop reason: ALTCHOICE

## 2021-05-25 ASSESSMENT — PAIN SCALES - GENERAL: PAINLEVEL_OUTOF10: 0

## 2021-05-25 NOTE — PROGRESS NOTES
0839-Patient to Phase II via cart. Report received from [facility]. Patient drowsy but responsive. Vitals obtained and stable. Respirations even and unlabored on room air. Patient denies pain, nausea, numbness and tingling. Patient able to move all extremities. No drainage noted at injection sites. Patient instructed to stay in bed. Instructed on call light use. 0842-Patient provided with snack and drink. Denies needs. Call light in reach. 0859-IV removed with no complications. Patient getting dressed at bedside. 0907-Patient discharged in stable condition with responsible . All belongings given to patient. Patient ambulated to car with assistance from RN. Patient tolerated well.

## 2021-05-25 NOTE — PROCEDURES
Pre-operative Diagnosis:  Left hip pain     Post-operative Diagnosis: Left hip pain     Procedure:  Leftintra-articular hip injection     Procedure Description:  After having obtained a signed informed consent, the patient was taken to the fluoroscopy suite where he was placed in the supine position.  The patient's left hip was prepped with chloraprep, and was draped in a sterile fashion.  A total of 2 cc of  1 % lidocaine was used to anesthetize the skin and underlying tissues at the desired levels.  Under fluoroscopic guidance in an AP view, a 22-gauge 3-1/2 inch spinal needle was advanced under fluoroscopic guidance in a coaxial view to lie in the middle of the neck of the femur. There were no paresthesias, heme, or CSF aspiration.  0.25 cc of Omnipaque 300 was injected showing appropriate spread into the hip joint.  Then, 40 mg of Depo-Medrol mixed with 4 cc of 0.5% bupivacaine was injected into the joint. The needle was removed without any complication.  The patient tolerated the procedure well and was transported to the recovery room and observed for 15 minutes prior to being discharged in ambulatory fashion.     Procedural Complications: None      IV sedation was used during the procedure:  - Moderate intravenous conscious sedation was supervised by Dr. Heather Khalil  - The patient was independently monitored by a Registered Nurse assigned to the procedure room  - Monitoring included automated blood pressure, continuous EKG, and continuous pulse oximetry  - The detailed conscious record is permanently stored in the [place] [id number]  - The following is the conscious sedation record:  Start Time: 08:30  End Time : 08:45  Duration: 15 minutes   Medications Administered: 1 mg Versed, 50 mcg Fentanyl       Teofilo Rosa DO  Interventional Pain Management/PM&R   [facility] [place]

## 2021-07-16 ENCOUNTER — OFFICE VISIT (OUTPATIENT)
Dept: PHYSICAL MEDICINE AND REHAB | Age: 76
End: 2021-07-16
Payer: MEDICARE

## 2021-07-16 VITALS
DIASTOLIC BLOOD PRESSURE: 74 MMHG | SYSTOLIC BLOOD PRESSURE: 116 MMHG | BODY MASS INDEX: 25.4 KG/M2 | WEIGHT: 121 LBS | HEIGHT: 58 IN

## 2021-07-16 DIAGNOSIS — G89.29 OTHER CHRONIC PAIN: ICD-10-CM

## 2021-07-16 DIAGNOSIS — M25.552 LEFT HIP PAIN: ICD-10-CM

## 2021-07-16 DIAGNOSIS — M54.17 RADICULOPATHY, LUMBOSACRAL REGION: Primary | ICD-10-CM

## 2021-07-16 DIAGNOSIS — M79.2 NEUROPATHIC PAIN: ICD-10-CM

## 2021-07-16 PROCEDURE — 99214 OFFICE O/P EST MOD 30 MIN: CPT | Performed by: ANESTHESIOLOGY

## 2021-07-16 PROCEDURE — 1123F ACP DISCUSS/DSCN MKR DOCD: CPT | Performed by: ANESTHESIOLOGY

## 2021-07-16 PROCEDURE — G8417 CALC BMI ABV UP PARAM F/U: HCPCS | Performed by: ANESTHESIOLOGY

## 2021-07-16 PROCEDURE — 4040F PNEUMOC VAC/ADMIN/RCVD: CPT | Performed by: ANESTHESIOLOGY

## 2021-07-16 PROCEDURE — 3017F COLORECTAL CA SCREEN DOC REV: CPT | Performed by: ANESTHESIOLOGY

## 2021-07-16 PROCEDURE — G8427 DOCREV CUR MEDS BY ELIG CLIN: HCPCS | Performed by: ANESTHESIOLOGY

## 2021-07-16 PROCEDURE — G8399 PT W/DXA RESULTS DOCUMENT: HCPCS | Performed by: ANESTHESIOLOGY

## 2021-07-16 PROCEDURE — 1090F PRES/ABSN URINE INCON ASSESS: CPT | Performed by: ANESTHESIOLOGY

## 2021-07-16 PROCEDURE — 1036F TOBACCO NON-USER: CPT | Performed by: ANESTHESIOLOGY

## 2021-07-16 NOTE — PROGRESS NOTES
Chronic Pain/PM&R Clinic Note     Encounter Date: 7/16/21    Subjective:   Chief Complaint:   Chief Complaint   Patient presents with    Follow Up After Procedure     patient did not get any relief from procedure       History of Present Illness:   Randall Canas is a 78 y.o. female seen in the clinic initially on 11/16/20 upon request from Ruby Velásquez DO (orthopedic surgery) for her history of chronic left groin and posterior thigh pain. She states that her pain started after a fall on ice back in February 2020. She describes her pain as a constant ache in the left buttocks with radiation down the posterior thigh and in to the right groin down the front of the thigh. She states that the pain is typically 4/10 on a day-to-day basis. She states she saw a chiropractor when initially happened which did give her some relief but then her pain returned at the end of April. She denies any focal leg weakness, paresthesias, saddle anesthesia, bowel/bladder incontinence. Her pain comes and goes and she is not sure what exacerbates her pain. She does get relief with the occasional use of Tylenol and tramadol. She states that she did actually suffer a meniscus tear about 2 weeks ago and is slated to have an MRI next week for evaluation. She states she has completed physical therapy for her low back and continues with home core strengthening exercises. Today, 7/16/2021, patient presents for planned follow-up for left hip and leg pain. She underwent a left intra-articular hip injection on 5/25/2021. She reports no relief from this injection. She continues to have pain radiating down the left leg particular in the lateral aspect of the thigh somewhat past the knee. She has some associated numbness/tingling in this distribution as well. She states this has definitely worse over the last couple days if she had cancer removed on her right foot.   She denies any true leg weakness, saddle anesthesia, bowel/bladder incontinence. She states that she has met with Dr. Cyndie Jacques for discussion of back surgery but does not want to pursue back surgery and so she is exhausted all other measures. She continues to take her gabapentin and tramadol which does help take the edge off her pain. History of Intervention:   Surgery: No previous low back surgeries  Injections: Left SI joint injection (12/1/20) - minimal relief  Caudal YAA (3/30/2021)greater than 50% pain relief x 6 weeks    Current Treatment Medications:   Tylenol PRN  Tramadol 25 mg (every 3rd day)  Gabapentin 300 mg BID    Historical Treatment Medications:   None    Imaging:  MRI Lspine (9/10/20)    PROCEDURE: MRI LUMBAR SPINE WO CONTRAST         CLINICAL INFORMATION: Lumbar radiculopathy. Low back pain radiating into left hip and groin for 7 months after fall.         COMPARISON: Lumbar spine radiographs dated 6/22/2020.         TECHNIQUE: Sagittal and axial T1 and T2-weighted images were obtained through the lumbar spine.         FINDINGS:    There is a dextrocurvature of the lumbar spine, similar to prior radiographs. There is grade 1 anterolisthesis of L4 relative to L5 and grade 2 anterolisthesis of L5 relative to S1 on the basis of degenerative change, also stable compared to prior    radiographs. There is otherwise anatomic vertebral body height and alignment. There is disc space narrowing and mild osteophytosis at T11-12 with hyperintense T1 and T2 signal at the opposing endplates as evidence for Modic 2 degenerative change. Marrow    signal is otherwise within normal limits. The conus terminates in a normal fashion at the L1-2 level. Paraspinal soft tissues are unremarkable. At T12-L1 there is no significant spinal canal or neuroforaminal stenosis. At L1-2 there is no significant spinal canal or neuroforaminal stenosis. At L2-3 there is a shallow disc bulge without significant spinal canal or neuroforaminal stenosis.     At L3-4 there is a shallow disc bulge without significant spinal canal or neuroforaminal stenosis. At L4-5 there is partial uncovering of the disc with superimposed shallow disc bulge which contributes to mild spinal canal stenosis in association with facet hypertrophy and ligament flavum thickening. There is mild to moderate bilateral neural    foraminal stenosis. At L5-S1 there is partial uncovering of the disc with superimposed shallow disc bulge which contributes to moderate spinal canal stenosis in association with facet hypertrophy and ligamentum flavum thickening. There is flattening of the neural foramina    with moderate bilateral neural foraminal stenosis.           Impression     Multilevel degenerative changes most pronounced at L5-S1 where prominent facet hypertrophy and ligamentum flavum thickening cause grade 2 anterolisthesis of L5 relative to S1 with partial uncovering the disc with superimposed disc bulge contributing to    moderate spinal canal stenosis and moderate bilateral neural foraminal stenosis.                  XR Left Hip (6/2/20)    PROCEDURE: XR HIP LEFT (2-3 VIEWS)       CLINICAL INFORMATION: Left hip pain.       COMPARISON: No prior study.       TECHNIQUE: AP and frog-leg lateral views of the left hip performed.           FINDINGS:    POST OPERATIVE: None.       ALIGNMENT: Anatomic.       MINERALIZATION: Normal.       FRACTURE: None.       HIP JOINT: Unremarkable.       LEFT SACROILIAC JOINT:    1. Mild sclerosis with associated erosive changes along the sacral and iliac margins of the visualized portions of the left sacroiliac joint consistent with a chronic sacroiliitis.       SYMPHYSIS:    1. Mild degenerative sclerosis at the symphysis.        Past Medical History:   Diagnosis Date    Angiomyolipoma     left kidney    Arthritis     hands    Blood transfusion reaction     Cancer (HCC)     left breast    Cancer of skin     Chronic kidney disease     Stage III    GERD (gastroesophageal reflux disease)     Hyperlipidemia     Hypertension     Osteopenia     Osteoporosis     Prolonged emergence from general anesthesia        Past Surgical History:   Procedure Laterality Date    APPENDECTOMY  1971    BREAST LUMPECTOMY  2009    left    BREAST SURGERY  70's    left biopsy    BREAST SURGERY  2009    Lumpectomy of the Left breast     SECTION  1971 and [address]  2006    COLONOSCOPY  2004    [facility] [employer] INJECTION PROCEDURE FOR SACROILIAC JOINT Left 2020    left SI joint injection performed by Marquis Lu DO at [address]  2004    HIP SURGERY Left 2021    left intra-articular hip injection performed by Marquis Lu DO at [facility] Left     LEG SURGERY Right 2021    cancer removed from right leg    PAIN MANAGEMENT PROCEDURE N/A 3/30/2021    caudal epidural steroid injection performed by Marquis Lu DO at [facility] Right 2013    Squemous cell cancer Right leg    SKIN BIOPSY Left 2013    Squemous cell cancer Left leg    SKIN CANCER EXCISION Left 13    Exc SCC Left Lower Leg with flap    TOTAL NEPHRECTOMY  2006    right       Family History   Problem Relation Age of Onset    Cancer Mother         melanoma, tongue    Hypertension Mother     High Cholesterol Mother     Heart Failure Father        Social History     Socioeconomic History    Marital status:      Spouse name: Not on file    Number of children: Not on file    Years of education: Not on file    Highest education level: Not on file   Occupational History     Employer: [employer]   Tobacco Use    Smoking status: Never Smoker    Smokeless tobacco: Never Used   Vaping Use    Vaping Use: Never used   Substance and Sexual Activity    Alcohol use: No    Drug use: No    Sexual activity: Not Currently   Other Topics Concern    Not on file   Social History Narrative    Not on file     Social Determinants of Health     Financial Resource Strain:     Difficulty of Paying Living Expenses:    Food Insecurity:     Worried About Running Out of Food in the Last Year:     [address] in the Last Year:    Transportation Needs:     Lack of Transportation (Medical):      Lack of Transportation (Non-Medical):    Physical Activity:     Days of Exercise per Week:     Minutes of Exercise per Session:    Stress:     Feeling of Stress :    Social Connections:     Frequency of Communication with Friends and Family:     Frequency of Social Gatherings with Friends and Family:     Attends Orthodoxy Services:     Active Member of Clubs or Organizations:     Attends Club or Organization Meetings:     Marital Status:    Intimate Partner Violence:     Fear of Current or Ex-Partner:     Emotionally Abused:     Physically Abused:     Sexually Abused:        Medications & Allergies:   Current Outpatient Medications   Medication Instructions    acetaminophen (TYLENOL) 500 mg, Oral, PRN    aspirin 81 mg, DAILY    atorvastatin (LIPITOR) 10 MG tablet TAKE 1 TABLET BY MOUTH EVERY DAY    calcitonin (MIACALCIN) 200 UNIT/ACT nasal spray PLACE 1 SPRAY IN ONE NOSTRIL ONCE DAILY ALTERNATING NOSTRILS DAILY    Calcium + D (CALTRATE) 600-200 MG-UNIT tablet 2 tablets, DAILY    gabapentin (NEURONTIN) 300 mg, Oral, 2 TIMES DAILY    lansoprazole (PREVACID SOLUTAB) 15 mg, Oral, DAILY    losartan (COZAAR) 25 MG tablet TAKE 1 TABLET BY MOUTH EVERY DAY    therapeutic multivitamin-minerals (THERAGRAN-M) tablet 1 tablet, DAILY    traMADol (ULTRAM) 25 mg, Oral, EVERY 6 HOURS PRN       Allergies   Allergen Reactions    Cipro Xr Swelling and Rash    Dye [Iodides]      Patient only has one kidney       Review of Systems:   Constitutional: negative for weight changes or fevers  Genitourinary: negative for bowel/bladder incontinence   Musculoskeletal: Positive for left hip and left leg pain  Neurological: Negative for focal leg weakness or numbness/tingling  Behavioral/Psych: positive for anxiety/depression   All other systems reviewed and are negative    Objective:     Vitals:    07/16/21 1106   BP: 116/74     Constitutional: Pleasant, no acute distress   Head: Normocephalic, atraumatic   Eyes: Conjunctivae normal   Neck: Supple, symmetrical   Lungs: Normal respiratory effort, non-labored breathing   Cardiovascular: Limbs warm and well perfused   Abdomen: Non-protruded   Musculoskeletal: Muscle bulk symmetric, no atrophy, no gross deformities   · Lumbar Spine: Leg length discrepancy appreciated (left leg 1 inch longer than right). Lumbar paraspinals non-tender bilaterally. SLR positive left. JOSÉ LUIS equivocal on left. Negative facet loading bilaterally. Neurological: Cranial nerves II-XII grossly intact. · Gait - Antalgic gait. Ambulates with assist device. · Motor: No focal motor deficit appreciated lower limbs  · Sensory: LT sensation intact in lower limbs   Skin: No rashes or lesions visible in low back  Psychological: Cooperative, no exaggerated pain behaviors       Assessment:    Diagnosis Orders   1. Radiculopathy, lumbosacral region  CHG FLUOR NEEDLE/CATH SPINE/PARASPINAL DX/THER ADDON    MO NJX AA&/STRD TFRML EPI LUMBAR/SACRAL 1 LEVEL    MO NJX AA&/STRD TFRML EPI LUMBAR/SACRAL EA ADDL   2. Left hip pain     3. Other chronic pain     4. Neuropathic pain         Renae Gaxiola is a 78 y. o.female presenting to the pain clinic for evaluation of chronic left buttocks and anterior groin pain. Her clinical history and physical examination show a mixed pattern of pain that could be potentially coming from the lumbar spine from spinal stenosis versus SI joint dysfunction versus left intrinsic hip pathology. She failed to respond to a left SI joint injection and has continued anterior groin pain.   I have set her up for a caudal epidural steroid injection to see if this is potentially coming from the spine particularly from the L4/L5 and L5/S1 level as any interlaminar or transforaminal injection at these levels would be very difficult to access the epidural space. She responded somewhat to a caudal epidural steroid injection but failed to respond to a left intra-articular hip injection. At this point it appears her pain generator is coming from her spine. I have set her up for a left L4 and L5 transforaminal epidural steroid injection. If patient fails to respond this will likely look into medication management including Lyrica or a stronger dose opioid. Plan: The following treatment recommendations and plan were discussed in detail with Lesli Renyoso. Imaging:   I have reviewed patients imaging of MRI L-spine and left hip XR and results were discussed with patient today. Analgesics:   Patient is taking Acetaminophen. Patient informed that the maximum amount of acetaminophen taken on a regular basis should only be 4000 mg per day. Adjuvants:   None    Interventions: In presence of lumbosacral radiating pain, the option of LEFT L4 and L5 transforaminal epidural steroid injection was chosen. The risks and benefits were discussed in detail with the patient. Patient wants to proceed with the injection. Anticoagulation/NPO Recommendations:   Patient does not need to hold any medications prior to the procedure. Patient will need to be NPO x 8 hours prior to the procedure. We will start an IV prior to the procedure. Multidisciplinary Pain Management:   In the presence of complex, chronic, and multi-factorial pain, the importance of a multidisciplinary approach to pain management in the patients management regimen was emphasized and discussed in great detail.    PHYSICAL THERAPY: I encouraged the patient to continue her core strengthening exercise program    Referrals:  None    Prescriptions Written This Visit:   None    Follow-up: For LEFT L4

## 2021-07-16 NOTE — PATIENT INSTRUCTIONS
The following treatment recommendations and plan were discussed in detail with Иван Rodrigez. Imaging:   I have reviewed patients imaging of MRI L-spine and left hip XR and results were discussed with patient today. Analgesics:   Patient is taking Acetaminophen. Patient informed that the maximum amount of acetaminophen taken on a regular basis should only be 4000 mg per day. Adjuvants:   None    Interventions: In presence of lumbosacral radiating pain, the option of LEFT L4 and L5 transforaminal epidural steroid injection was chosen. The risks and benefits were discussed in detail with the patient. Patient wants to proceed with the injection. Anticoagulation/NPO Recommendations:   Patient does not need to hold any medications prior to the procedure. Patient will need to be NPO x 8 hours prior to the procedure. We will start an IV prior to the procedure. Multidisciplinary Pain Management:   In the presence of complex, chronic, and multi-factorial pain, the importance of a multidisciplinary approach to pain management in the patients management regimen was emphasized and discussed in great detail.    PHYSICAL THERAPY: I encouraged the patient to continue her core strengthening exercise program    Referrals:  None    Prescriptions Written This Visit:   None    Follow-up: For LEFT L4 and L5 TFESI

## 2021-07-26 ENCOUNTER — TELEPHONE (OUTPATIENT)
Dept: PHYSICAL MEDICINE AND REHAB | Age: 76
End: 2021-07-26

## 2021-08-02 NOTE — H&P
Today, patient presents for planned LEFT L4 and L5 transforaminal epidural steroid injection. This note is reflective of the patient's previous visit for evaluation. We will proceed with today's planned procedure. Since patient's last visit for evaluation, there have been no interval changes in medical history. Patient has no new numbness, weakness, or focal neurological deficit since evaluation. Patient has no contraindications to injection (no anticoagulation or recent antibiotic intake for active infections), and has a  present or is able to drive themselves (as discussed and cleared by physician). Allergies to latex, contrast dye, and steroid medications have been confirmed with the patient prior to the procedure. NPO necessity has been assessed and accepted based on procedure complexity. The risks and benefits of the procedure have been explained including but are not limited to infection, bleeding, paralysis, immediate post procedure weakness, and dizziness; the patient acknowledges understanding and desires to proceed with the procedure. Patient has signed consent for same procedure as discussed in previous clinic encounter. All other questions and concerns were addressed at bedside. See procedure note for full details. Follow up: 9/2/21    Post procedure Instructions: The patient was advised not to drive during the day of the procedure and not to engage in any significant decision making (unless otherwise states by physician). The patient was also advised to be cautious with walking/activity for 24 hours following today's visit and asked not to engage in over-exertion (unless otherwise states by physician). After this time, it is ok to resume pre-procedure level of activity. Patient advised to apply ice to site of injection in situations of pain and discomfort. Patient advised to not submerge site of injection during bath or pool activities for approximately 24 hours post-procedure.  Patient foot.  She denies any true leg weakness, saddle anesthesia, bowel/bladder incontinence. She states that she has met with Dr. Delfina Gowers for discussion of back surgery but does not want to pursue back surgery and so she is exhausted all other measures. She continues to take her gabapentin and tramadol which does help take the edge off her pain. History of Intervention:   Surgery: No previous low back surgeries  Injections: Left SI joint injection (12/1/20) - minimal relief  Caudal YAA (3/30/2021)-greater than 50% pain relief x 6 weeks    Current Treatment Medications:   Tylenol PRN  Tramadol 25 mg (every 3rd day)  Gabapentin 300 mg BID    Historical Treatment Medications:   None    Imaging:  MRI L-spine (9/10/20)    PROCEDURE: MRI LUMBAR SPINE WO CONTRAST         CLINICAL INFORMATION: Lumbar radiculopathy. Low back pain radiating into left hip and groin for 7 months after fall.         COMPARISON: Lumbar spine radiographs dated 6/22/2020.         TECHNIQUE: Sagittal and axial T1 and T2-weighted images were obtained through the lumbar spine.         FINDINGS:    There is a dextrocurvature of the lumbar spine, similar to prior radiographs. There is grade 1 anterolisthesis of L4 relative to L5 and grade 2 anterolisthesis of L5 relative to S1 on the basis of degenerative change, also stable compared to prior    radiographs. There is otherwise anatomic vertebral body height and alignment. There is disc space narrowing and mild osteophytosis at T11-12 with hyperintense T1 and T2 signal at the opposing endplates as evidence for Modic 2 degenerative change. Marrow    signal is otherwise within normal limits. The conus terminates in a normal fashion at the L1-2 level. Paraspinal soft tissues are unremarkable. At T12-L1 there is no significant spinal canal or neuroforaminal stenosis. At L1-2 there is no significant spinal canal or neuroforaminal stenosis.     At L2-3 there is a shallow disc bulge without significant spinal canal or neuroforaminal stenosis. At L3-4 there is a shallow disc bulge without significant spinal canal or neuroforaminal stenosis. At L4-5 there is partial uncovering of the disc with superimposed shallow disc bulge which contributes to mild spinal canal stenosis in association with facet hypertrophy and ligament flavum thickening. There is mild to moderate bilateral neural    foraminal stenosis. At L5-S1 there is partial uncovering of the disc with superimposed shallow disc bulge which contributes to moderate spinal canal stenosis in association with facet hypertrophy and ligamentum flavum thickening. There is flattening of the neural foramina    with moderate bilateral neural foraminal stenosis.           Impression     Multilevel degenerative changes most pronounced at L5-S1 where prominent facet hypertrophy and ligamentum flavum thickening cause grade 2 anterolisthesis of L5 relative to S1 with partial uncovering the disc with superimposed disc bulge contributing to    moderate spinal canal stenosis and moderate bilateral neural foraminal stenosis.                  XR Left Hip (6/2/20)    PROCEDURE: XR HIP LEFT (2-3 VIEWS)       CLINICAL INFORMATION: Left hip pain.       COMPARISON: No prior study.       TECHNIQUE: AP and frog-leg lateral views of the left hip performed.           FINDINGS:    POST OPERATIVE: None.       ALIGNMENT: Anatomic.       MINERALIZATION: Normal.       FRACTURE: None.       HIP JOINT: Unremarkable.       LEFT SACROILIAC JOINT:    1. Mild sclerosis with associated erosive changes along the sacral and iliac margins of the visualized portions of the left sacroiliac joint consistent with a chronic sacroiliitis.       SYMPHYSIS:    1. Mild degenerative sclerosis at the symphysis.        Past Medical History:   Diagnosis Date    Angiomyolipoma     left kidney    Arthritis     hands    Blood transfusion reaction     Cancer (HCC)     left breast    Cancer of skin     Chronic kidney disease     Stage III    GERD (gastroesophageal reflux disease)     Hyperlipidemia     Hypertension     Osteopenia     Osteoporosis     Prolonged emergence from general anesthesia        Past Surgical History:   Procedure Laterality Date    APPENDECTOMY  1971    BREAST LUMPECTOMY  2009    left    BREAST SURGERY  70's    left biopsy    BREAST SURGERY  2009    Lumpectomy of the Left breast     SECTION  1971 and [id number] [facility]  2006    COLONOSCOPY  2004    [facility]. INJECTION PROCEDURE FOR SACROILIAC JOINT Left 2020    left SI joint injection performed by Laura Sethi DO at [facility]  2004    HIP SURGERY Left 2021    left intra-articular hip injection performed by Laura Sethi DO at [facility] Left     LEG SURGERY Right 2021    cancer removed from right leg    PAIN MANAGEMENT PROCEDURE N/A 3/30/2021    caudal epidural steroid injection performed by Laura Sethi DO at [facility] Right 2013    Squemous cell cancer Right leg    SKIN BIOPSY Left 2013    Squemous cell cancer Left leg    SKIN CANCER EXCISION Left 13    Exc SCC Left Lower Leg with flap    TOTAL NEPHRECTOMY  2006    right       Family History   Problem Relation Age of Onset    Cancer Mother         melanoma, tongue    Hypertension Mother     High Cholesterol Mother     Heart Failure Father        Social History     Socioeconomic History    Marital status:      Spouse name: Not on file    Number of children: Not on file    Years of education: Not on file    Highest education level: Not on file   Occupational History     Employer: [employer]   Tobacco Use    Smoking status: Never Smoker    Smokeless tobacco: Never Used   Vaping Use    Vaping Use: Never used   Substance and Sexual Activity    Alcohol use: No    Drug use: No    Sexual activity: Not Currently   Other Topics Concern    Not on file   Social History Narrative    Not on file     Social Determinants of Health     Financial Resource Strain:     Difficulty of Paying Living Expenses:    Food Insecurity:     Worried About Running Out of Food in the Last Year:     [address] in the Last Year:    Transportation Needs:     Lack of Transportation (Medical):      Lack of Transportation (Non-Medical):    Physical Activity:     Days of Exercise per Week:     Minutes of Exercise per Session:    Stress:     Feeling of Stress :    Social Connections:     Frequency of Communication with Friends and Family:     Frequency of Social Gatherings with Friends and Family:     Attends Synagogue Services:     Active Member of Clubs or Organizations:     Attends Club or Organization Meetings:     Marital Status:    Intimate Partner Violence:     Fear of Current or Ex-Partner:     Emotionally Abused:     Physically Abused:     Sexually Abused:        Medications & Allergies:   Current Outpatient Medications   Medication Instructions    acetaminophen (TYLENOL) 500 mg, Oral, PRN    aspirin 81 mg, DAILY    atorvastatin (LIPITOR) 10 MG tablet TAKE 1 TABLET BY MOUTH EVERY DAY    calcitonin (MIACALCIN) 200 UNIT/ACT nasal spray PLACE 1 SPRAY IN ONE NOSTRIL ONCE DAILY ALTERNATING NOSTRILS DAILY    Calcium + D (CALTRATE) 600-200 MG-UNIT tablet 2 tablets, DAILY    gabapentin (NEURONTIN) 300 mg, Oral, 2 TIMES DAILY    lansoprazole (PREVACID SOLUTAB) 15 mg, Oral, DAILY    losartan (COZAAR) 25 MG tablet TAKE 1 TABLET BY MOUTH EVERY DAY    therapeutic multivitamin-minerals (THERAGRAN-M) tablet 1 tablet, DAILY    traMADol (ULTRAM) 25 mg, Oral, EVERY 6 HOURS PRN       Allergies   Allergen Reactions    Cipro Xr Swelling and Rash    Dye [Iodides]      Patient only has one kidney       Review of Systems:   Constitutional: negative for weight changes or fevers  Genitourinary: negative for bowel/bladder incontinence   Musculoskeletal: Positive for left hip and left leg pain  Neurological: Negative for focal leg weakness or numbness/tingling  Behavioral/Psych: positive for anxiety/depression   All other systems reviewed and are negative    Objective: There were no vitals filed for this visit. Constitutional: Pleasant, no acute distress   Head: Normocephalic, atraumatic   Eyes: Conjunctivae normal   Neck: Supple, symmetrical   Lungs: Normal respiratory effort, non-labored breathing   Cardiovascular: Limbs warm and well perfused   Abdomen: Non-protruded   Musculoskeletal: Muscle bulk symmetric, no atrophy, no gross deformities   · Lumbar Spine: Leg length discrepancy appreciated (left leg 1 inch longer than right). Lumbar paraspinals non-tender bilaterally. SLR positive left. JOSÉ LUIS equivocal on left. Negative facet loading bilaterally. Neurological: Cranial nerves II-XII grossly intact. · Gait - Antalgic gait. Ambulates with assist device. · Motor: No focal motor deficit appreciated lower limbs  · Sensory: LT sensation intact in lower limbs   Skin: No rashes or lesions visible in low back  Psychological: Cooperative, no exaggerated pain behaviors       Assessment:    Diagnosis Orders   1. Radiculopathy, lumbosacral region  CHG FLUOR NEEDLE/CATH SPINE/PARASPINAL DX/THER ADDON    WV NJX AA&/STRD TFRML EPI LUMBAR/SACRAL 1 LEVEL    [place] NJX AA&/STRD TFRML EPI LUMBAR/SACRAL EA ADDL   2. Left hip pain     3. Other chronic pain     4. Neuropathic pain         Lorrie Belle is a 78 y. o.female presenting to the pain clinic for evaluation of chronic left buttocks and anterior groin pain. Her clinical history and physical examination show a mixed pattern of pain that could be potentially coming from the lumbar spine from spinal stenosis versus SI joint dysfunction versus left intrinsic hip pathology. She failed to respond to a left SI joint injection and has continued anterior groin pain. I have set her up for a caudal epidural steroid injection to see if this is potentially coming from the spine particularly from the L4/L5 and L5/S1 level as any interlaminar or transforaminal injection at these levels would be very difficult to access the epidural space. She responded somewhat to a caudal epidural steroid injection but failed to respond to a left intra-articular hip injection. At this point it appears her pain generator is coming from her spine. I have set her up for a left L4 and L5 transforaminal epidural steroid injection. If patient fails to respond this will likely look into medication management including Lyrica or a stronger dose opioid. Plan: The following treatment recommendations and plan were discussed in detail with Jamar Gong. Imaging:   I have reviewed patients imaging of MRI L-spine and left hip XR and results were discussed with patient today. Analgesics:   Patient is taking Acetaminophen. Patient informed that the maximum amount of acetaminophen taken on a regular basis should only be 4000 mg per day. Adjuvants:   None    Interventions: In presence of lumbosacral radiating pain, the option of LEFT L4 and L5 transforaminal epidural steroid injection was chosen. The risks and benefits were discussed in detail with the patient. Patient wants to proceed with the injection. Anticoagulation/NPO Recommendations:   Patient does not need to hold any medications prior to the procedure. Patient will need to be NPO x 8 hours prior to the procedure. We will start an IV prior to the procedure. Multidisciplinary Pain Management:   In the presence of complex, chronic, and multi-factorial pain, the importance of a multidisciplinary approach to pain management in the patients management regimen was emphasized and discussed in great detail.    PHYSICAL THERAPY: I encouraged the patient to continue her core strengthening exercise program    Referrals:  None    Prescriptions Written This Visit:   None    Follow-up: For LEFT L4 and L5 TFESI      Teofilo Bolden, DO  Interventional Pain Management/PM&R   [place]

## 2021-08-03 ENCOUNTER — APPOINTMENT (OUTPATIENT)
Dept: GENERAL RADIOLOGY | Age: 76
End: 2021-08-03
Attending: ANESTHESIOLOGY
Payer: MEDICARE

## 2021-08-03 ENCOUNTER — HOSPITAL ENCOUNTER (OUTPATIENT)
Age: 76
Setting detail: OUTPATIENT SURGERY
Discharge: HOME | End: 2021-08-03
Attending: ANESTHESIOLOGY | Admitting: ANESTHESIOLOGY
Payer: MEDICARE

## 2021-08-03 VITALS
TEMPERATURE: 96.9 F | OXYGEN SATURATION: 94 % | RESPIRATION RATE: 16 BRPM | HEIGHT: 58 IN | DIASTOLIC BLOOD PRESSURE: 75 MMHG | HEART RATE: 91 BPM | WEIGHT: 120.6 LBS | SYSTOLIC BLOOD PRESSURE: 118 MMHG | BODY MASS INDEX: 25.31 KG/M2

## 2021-08-03 PROCEDURE — 64483 NJX AA&/STRD TFRM EPI L/S 1: CPT | Performed by: ANESTHESIOLOGY

## 2021-08-03 PROCEDURE — 6360000002 HC RX W HCPCS: Performed by: ANESTHESIOLOGY

## 2021-08-03 PROCEDURE — 3600000054 HC PAIN LEVEL 3 BASE: Performed by: ANESTHESIOLOGY

## 2021-08-03 PROCEDURE — 2500000003 HC RX 250 WO HCPCS: Performed by: ANESTHESIOLOGY

## 2021-08-03 PROCEDURE — 99152 MOD SED SAME PHYS/QHP 5/>YRS: CPT | Performed by: ANESTHESIOLOGY

## 2021-08-03 PROCEDURE — 3209999900 FLUORO FOR SURGICAL PROCEDURES

## 2021-08-03 PROCEDURE — 2709999900 HC NON-CHARGEABLE SUPPLY: Performed by: ANESTHESIOLOGY

## 2021-08-03 PROCEDURE — 7100000011 HC PHASE II RECOVERY - ADDTL 15 MIN: Performed by: ANESTHESIOLOGY

## 2021-08-03 PROCEDURE — 7100000010 HC PHASE II RECOVERY - FIRST 15 MIN: Performed by: ANESTHESIOLOGY

## 2021-08-03 PROCEDURE — 3600000055 HC PAIN LEVEL 3 ADDL 15 MIN: Performed by: ANESTHESIOLOGY

## 2021-08-03 PROCEDURE — 64484 NJX AA&/STRD TFRM EPI L/S EA: CPT | Performed by: ANESTHESIOLOGY

## 2021-08-03 PROCEDURE — 6360000004 HC RX CONTRAST MEDICATION: Performed by: ANESTHESIOLOGY

## 2021-08-03 RX ORDER — MIDAZOLAM HYDROCHLORIDE 1 MG/ML
INJECTION INTRAMUSCULAR; INTRAVENOUS PRN
Status: DISCONTINUED | OUTPATIENT
Start: 2021-08-03 | End: 2021-08-03 | Stop reason: ALTCHOICE

## 2021-08-03 RX ORDER — FENTANYL CITRATE 50 UG/ML
INJECTION, SOLUTION INTRAMUSCULAR; INTRAVENOUS PRN
Status: DISCONTINUED | OUTPATIENT
Start: 2021-08-03 | End: 2021-08-03 | Stop reason: ALTCHOICE

## 2021-08-03 RX ORDER — DEXAMETHASONE SODIUM PHOSPHATE 4 MG/ML
INJECTION, SOLUTION INTRA-ARTICULAR; INTRALESIONAL; INTRAMUSCULAR; INTRAVENOUS; SOFT TISSUE PRN
Status: DISCONTINUED | OUTPATIENT
Start: 2021-08-03 | End: 2021-08-03 | Stop reason: ALTCHOICE

## 2021-08-03 RX ORDER — LIDOCAINE HYDROCHLORIDE 10 MG/ML
INJECTION, SOLUTION EPIDURAL; INFILTRATION; INTRACAUDAL; PERINEURAL PRN
Status: DISCONTINUED | OUTPATIENT
Start: 2021-08-03 | End: 2021-08-03 | Stop reason: ALTCHOICE

## 2021-08-03 RX ORDER — BUPIVACAINE HYDROCHLORIDE 5 MG/ML
INJECTION, SOLUTION PERINEURAL PRN
Status: DISCONTINUED | OUTPATIENT
Start: 2021-08-03 | End: 2021-08-03 | Stop reason: ALTCHOICE

## 2021-08-03 ASSESSMENT — PAIN - FUNCTIONAL ASSESSMENT: PAIN_FUNCTIONAL_ASSESSMENT: 0-10

## 2021-08-03 NOTE — PRE SEDATION
[address] [address]  Pre-Sedation/Analgesia History & Physical    Pt Name: Kiana Gillis  MRN: [id number]  YOB: 1945  Provider Performing Procedure: Nadia Huerta DO   Primary Care Physician: Vasile Boogie MD      MEDICAL HISTORY       has a past medical history of Angiomyolipoma, Arthritis, Blood transfusion reaction, Cancer ([address] Cancer of skin, Chronic kidney disease, GERD (gastroesophageal reflux disease), Hyperlipidemia, Hypertension, Osteopenia, Osteoporosis, and Prolonged emergence from general anesthesia. SURGICAL HISTORY   has a past surgical history that includes Appendectomy ();  section ( and ); Hysterectomy (); Hemorrhoid surgery (); total nephrectomy (); Cholecystectomy (); Breast surgery (); Breast surgery (); Colonoscopy (); Breast lumpectomy (); Skin cancer excision (Left, 13); skin biopsy (Right, 2013); skin biopsy (Left, 2013); Injection Procedure For Sacroiliac Joint (Left, 2020); Knee cartilage surgery (Left, ); Pain management procedure (N/A, 3/30/2021); hip surgery (Left, 2021); and Leg Surgery (Right, 2021). ALLERGIES   Allergies as of 2021 - Fully Reviewed 2021   Allergen Reaction Noted    Cipro xr Swelling and Rash 2011    Dye [iodides]  2020       MEDICATIONS   Prior to Admission medications    Medication Sig Start Date End Date Taking? Authorizing Provider   gabapentin (NEURONTIN) 300 MG capsule Take 1 capsule by mouth 2 times daily for 180 days.  4/8/21 10/5/21 Yes Vasile Boogie MD   calcitonin (MIACALCIN) 200 UNIT/ACT nasal spray PLACE 1 SPRAY IN ONE NOSTRIL ONCE DAILY ALTERNATING NOSTRILS DAILY 21  Yes Vasile Boogie MD   atorvastatin (LIPITOR) 10 MG tablet TAKE 1 TABLET BY MOUTH EVERY DAY 3/31/21  Yes Vasile Boogie MD   losartan (COZAAR) 25 MG tablet TAKE 1 TABLET BY MOUTH EVERY DAY 3/30/21  Yes Vasile Boogie MD   traMADol Rosa Lombard) 50 MG tablet Take 25 mg by mouth every 6 hours as needed for Pain. Yes Historical Provider, MD   lansoprazole (PREVACID SOLUTAB) 15 MG disintegrating tablet Take 15 mg by mouth daily   Yes Historical Provider, MD   acetaminophen (TYLENOL) 500 MG tablet Take 500 mg by mouth as needed    Yes Historical Provider, MD   Calcium + D (CALTRATE) 600-200 MG-UNIT tablet Take 2 tablets by mouth daily. Yes Historical Provider, MD   aspirin 81 MG EC tablet Take 81 mg by mouth daily. Yes Historical Provider, MD   therapeutic multivitamin-minerals (THERAGRAN-M) tablet Take 1 tablet by mouth daily. Yes Historical Provider, MD     PHYSICAL:   Vitals:    08/03/21 1401   BP: 118/75   Pulse: 91   Resp: 16   Temp: 96.9 °F (36.1 °C)   SpO2: 94%     PLANNED PROCEDURE   See procedure note  SEDATION  Planned agent: Versed and Fentanyl  ASA Classification: 2  Class 1: A normal healthy patient  Class 2: Pt with mild to moderate systemic disease  Class 3: Severe systemic disease or disturbance  Class 4: Severe systemic disorders that are already life threatening. Class 5: Moribund pt with little chances of survival, for more than 24 hours. Mallampati I Airway Classification: 1    1. Pre-procedure diagnostic studies complete and results available. 2. Previous sedation/anesthesia experiences assessed. 3. The patient is an appropriate candidate to undergo the planned procedure sedation and anesthesia. (Refer to nursing sedation/analgesia documentation record)  4. Formulation and discussion of sedation/procedure plan, risks, and expectations with patient and/or responsible adult completed. 5. Patient examined immediately prior to the procedure.  (Refer to nursing sedation/analgesia documentation record)    Mabel Gatica, DO  Electronically signed 8/3/2021 at 3:24 PM

## 2021-08-03 NOTE — POST SEDATION
[facility]  Sedation/Analgesia Post Sedation Record    Pt Name: Lesli Reynoso  MRN: [id number]  YOB: 1945  Procedure Performed By: Marcos Huizar DO  Primary Care Physician: Regla Villeda MD    POST-PROCEDURE    Physicians/Assistants: Marcos Huizar DO  Procedure Performed: See Procedure Note   Sedation/Anesthesia: Versed and Fentanyl (See procedure note for amount and duration)  Estimated Blood Loss:     0  ml  Specimens Removed: None        Complications: None           Teofilo Avelar DO  Electronically signed 8/3/2021 at 3:24 PM

## 2021-08-03 NOTE — PROGRESS NOTES
1359: Patient arrived to Phase II recovery via cart. Awake and alert. Report received from [employer], [facility].  1401: VSS, patient denies pain at this time. HOB elevated and snack and drink provided. Call light within reach. 1425: IV removed without complications. Band aid applied to site. Patient sat edge of bed without difficulty and dressed independently in room. 1428: Patient discharged home in stable condition with .

## 2021-08-18 ENCOUNTER — OFFICE VISIT (OUTPATIENT)
Dept: PHYSICAL MEDICINE AND REHAB | Age: 76
End: 2021-08-18
Payer: MEDICARE

## 2021-08-18 VITALS
SYSTOLIC BLOOD PRESSURE: 122 MMHG | WEIGHT: 120 LBS | BODY MASS INDEX: 25.19 KG/M2 | HEIGHT: 58 IN | DIASTOLIC BLOOD PRESSURE: 78 MMHG

## 2021-08-18 DIAGNOSIS — M53.3 CHRONIC LEFT SI JOINT PAIN: Primary | ICD-10-CM

## 2021-08-18 DIAGNOSIS — M21.70 LEG LENGTH DISCREPANCY: ICD-10-CM

## 2021-08-18 DIAGNOSIS — G89.29 OTHER CHRONIC PAIN: ICD-10-CM

## 2021-08-18 DIAGNOSIS — G89.29 CHRONIC LEFT-SIDED LOW BACK PAIN WITH LEFT-SIDED SCIATICA: ICD-10-CM

## 2021-08-18 DIAGNOSIS — M54.42 CHRONIC LEFT-SIDED LOW BACK PAIN WITH LEFT-SIDED SCIATICA: ICD-10-CM

## 2021-08-18 DIAGNOSIS — G89.29 CHRONIC LEFT SI JOINT PAIN: Primary | ICD-10-CM

## 2021-08-18 PROCEDURE — 1036F TOBACCO NON-USER: CPT | Performed by: ANESTHESIOLOGY

## 2021-08-18 PROCEDURE — 99214 OFFICE O/P EST MOD 30 MIN: CPT | Performed by: ANESTHESIOLOGY

## 2021-08-18 PROCEDURE — G8417 CALC BMI ABV UP PARAM F/U: HCPCS | Performed by: ANESTHESIOLOGY

## 2021-08-18 PROCEDURE — 1123F ACP DISCUSS/DSCN MKR DOCD: CPT | Performed by: ANESTHESIOLOGY

## 2021-08-18 PROCEDURE — 3017F COLORECTAL CA SCREEN DOC REV: CPT | Performed by: ANESTHESIOLOGY

## 2021-08-18 PROCEDURE — 1090F PRES/ABSN URINE INCON ASSESS: CPT | Performed by: ANESTHESIOLOGY

## 2021-08-18 PROCEDURE — G8427 DOCREV CUR MEDS BY ELIG CLIN: HCPCS | Performed by: ANESTHESIOLOGY

## 2021-08-18 PROCEDURE — G8399 PT W/DXA RESULTS DOCUMENT: HCPCS | Performed by: ANESTHESIOLOGY

## 2021-08-18 PROCEDURE — 4040F PNEUMOC VAC/ADMIN/RCVD: CPT | Performed by: ANESTHESIOLOGY

## 2021-08-18 RX ORDER — HYDROCODONE BITARTRATE AND ACETAMINOPHEN 5; 325 MG/1; MG/1
1 TABLET ORAL 2 TIMES DAILY PRN
Qty: 60 TABLET | Refills: 0 | Status: SHIPPED | OUTPATIENT
Start: 2021-08-18 | End: 2021-09-16 | Stop reason: SDUPTHER

## 2021-08-18 NOTE — PROGRESS NOTES
Chronic Pain/PM&R Clinic Note     Encounter Date: 8/18/21    Subjective:   Chief Complaint:   Chief Complaint   Patient presents with    Follow-up       History of Present Illness:   Jeyson Rankin is a 78 y.o. female seen in the clinic initially on 11/16/20 upon request from Cindy Jain DO (orthopedic surgery) for her history of chronic left groin and posterior thigh pain. She states that her pain started after a fall on ice back in February 2020. She describes her pain as a constant ache in the left buttocks with radiation down the posterior thigh and in to the right groin down the front of the thigh. She states that the pain is typically 4/10 on a day-to-day basis. She states she saw a chiropractor when initially happened which did give her some relief but then her pain returned at the end of April. She denies any focal leg weakness, paresthesias, saddle anesthesia, bowel/bladder incontinence. Her pain comes and goes and she is not sure what exacerbates her pain. She does get relief with the occasional use of Tylenol and tramadol. She states that she did actually suffer a meniscus tear about 2 weeks ago and is slated to have an MRI next week for evaluation. She states she has completed physical therapy for her low back and continues with home core strengthening exercises. Today, 8/18/2021, patient presents for planned follow-up for left hip and leg pain. She underwent a left L4 and L5 transforaminal epidural steroid injection on 8/3/2021. She reports some mild relief for the first couple days but no continued relief thereafter. She continues to have pain as previously described particularly in the left anterior and lateral thigh. She states that this pain has been progressively worse over the last couple days and she feels like she has been having difficulty walking due to the pain. She denies any new true focal leg weakness or leg paresthesias associated with this.   She continues to take bulge without significant spinal canal or neuroforaminal stenosis. At L4-5 there is partial uncovering of the disc with superimposed shallow disc bulge which contributes to mild spinal canal stenosis in association with facet hypertrophy and ligament flavum thickening. There is mild to moderate bilateral neural    foraminal stenosis. At L5-S1 there is partial uncovering of the disc with superimposed shallow disc bulge which contributes to moderate spinal canal stenosis in association with facet hypertrophy and ligamentum flavum thickening. There is flattening of the neural foramina    with moderate bilateral neural foraminal stenosis.           Impression     Multilevel degenerative changes most pronounced at L5-S1 where prominent facet hypertrophy and ligamentum flavum thickening cause grade 2 anterolisthesis of L5 relative to S1 with partial uncovering the disc with superimposed disc bulge contributing to    moderate spinal canal stenosis and moderate bilateral neural foraminal stenosis.                  XR Left Hip (6/2/20)    PROCEDURE: XR HIP LEFT (2-3 VIEWS)       CLINICAL INFORMATION: Left hip pain.       COMPARISON: No prior study.       TECHNIQUE: AP and frog-leg lateral views of the left hip performed.           FINDINGS:    POST OPERATIVE: None.       ALIGNMENT: Anatomic.       MINERALIZATION: Normal.       FRACTURE: None.       HIP JOINT: Unremarkable.       LEFT SACROILIAC JOINT:    1. Mild sclerosis with associated erosive changes along the sacral and iliac margins of the visualized portions of the left sacroiliac joint consistent with a chronic sacroiliitis.       SYMPHYSIS:    1. Mild degenerative sclerosis at the symphysis.        Past Medical History:   Diagnosis Date    Angiomyolipoma     left kidney    Arthritis     hands    Blood transfusion reaction     Cancer (HCC)     left breast    Cancer of skin     Chronic kidney disease     Stage III    GERD (gastroesophageal reflux disease)     Hyperlipidemia     Hypertension     Osteopenia     Osteoporosis     Prolonged emergence from general anesthesia        Past Surgical History:   Procedure Laterality Date    APPENDECTOMY  1971    BREAST LUMPECTOMY  2009    left    BREAST SURGERY  70's    left biopsy    BREAST SURGERY  2009    Lumpectomy of the Left breast   [address]  2006    COLONOSCOPY  2004    [facility]. INJECTION PROCEDURE FOR SACROILIAC JOINT Left 12/1/2020    left SI joint injection performed by Renetta Carr DO at [address] [address]    HIP SURGERY Left 5/25/2021    left intra-articular hip injection performed by Renetta Carr DO at [facility] Left 2021    LEG SURGERY Right 07/07/2021    cancer removed from right leg    PAIN MANAGEMENT PROCEDURE N/A 3/30/2021    caudal epidural steroid injection performed by Renetta Carr DO at [place] [address] Left 8/3/2021    LEFT L4 and L5 transforaminal epidural steroid injection performed by Renetta Carr DO at [facility] Right 06/2013    Squemous cell cancer Right leg    SKIN BIOPSY Left 12/02/2013    Squemous cell cancer Left leg    SKIN CANCER EXCISION Left 12/2/13    Exc SCC Left Lower Leg with flap    TOTAL NEPHRECTOMY  2006    right       Family History   Problem Relation Age of Onset    Cancer Mother         melanoma, tongue    Hypertension Mother     High Cholesterol Mother     Heart Failure Father        Social History     Socioeconomic History    Marital status:      Spouse name: Not on file    Number of children: Not on file    Years of education: Not on file    Highest education level: Not on file   Occupational History     Employer: [employer]   Tobacco Use    Smoking status: Never Smoker    Smokeless tobacco: Never Used Vaping Use    Vaping Use: Never used   Substance and Sexual Activity    Alcohol use: No    Drug use: No    Sexual activity: Not Currently   Other Topics Concern    Not on file   Social History Narrative    Not on file     Social Determinants of Health     Financial Resource Strain:     Difficulty of Paying Living Expenses:    Food Insecurity:     Worried About Running Out of Food in the Last Year:     [address] in the Last Year:    Transportation Needs:     Lack of Transportation (Medical):      Lack of Transportation (Non-Medical):    Physical Activity:     Days of Exercise per Week:     Minutes of Exercise per Session:    Stress:     Feeling of Stress :    Social Connections:     Frequency of Communication with Friends and Family:     Frequency of Social Gatherings with Friends and Family:     Attends [employer] Services:     Active Member of Clubs or Organizations:     Attends Club or Organization Meetings:     Marital Status:    Intimate Partner Violence:     Fear of Current or Ex-Partner:     Emotionally Abused:     Physically Abused:     Sexually Abused:        Medications & Allergies:   Current Outpatient Medications   Medication Instructions    acetaminophen (TYLENOL) 500 mg, Oral, PRN    aspirin 81 mg, DAILY    atorvastatin (LIPITOR) 10 MG tablet TAKE 1 TABLET BY MOUTH EVERY DAY    calcitonin (MIACALCIN) 200 UNIT/ACT nasal spray PLACE 1 SPRAY IN ONE NOSTRIL ONCE DAILY ALTERNATING NOSTRILS DAILY    Calcium + D (CALTRATE) 600-200 MG-UNIT tablet 2 tablets, DAILY    gabapentin (NEURONTIN) 300 mg, Oral, 2 TIMES DAILY    HYDROcodone-acetaminophen (NORCO) 5-325 MG per tablet 1 tablet, Oral, 2 TIMES DAILY PRN    lansoprazole (PREVACID SOLUTAB) 15 mg, Oral, DAILY    losartan (COZAAR) 25 MG tablet TAKE 1 TABLET BY MOUTH EVERY DAY    therapeutic multivitamin-minerals (THERAGRAN-M) tablet 1 tablet, DAILY    traMADol (ULTRAM) 25 mg, Oral, EVERY 6 HOURS PRN       Allergies and physical examination show a mixed pattern of pain that could be potentially coming from the lumbar spine from spinal stenosis versus SI joint dysfunction versus left intrinsic hip pathology. She failed to respond to a left SI joint injection and has continued anterior groin pain. I have set her up for a caudal epidural steroid injection to see if this is potentially coming from the spine particularly from the L4/L5 and L5/S1 level as any interlaminar or transforaminal injection at these levels would be very difficult to access the epidural space. She continues to appear to have severe left SI joint dysfunction/pain. She appears to not respond to steroids. I have set her up for a left SI joint test block. We will determine her response after this injection and then set her up for an SI ablation if she responds well to this. I have provided her with Norco for breakthrough pain as needed. Plan: The following treatment recommendations and plan were discussed in detail with Aan Glover. Imaging:   I have reviewed patients imaging of MRI L-spine and left hip XR and results were discussed with patient today. Analgesics:   Patient is taking Acetaminophen. Patient informed that the maximum amount of acetaminophen taken on a regular basis should only be 4000 mg per day. Adjuvants:   None    Interventions: With examination consistent with left sacroiliac dysfunction/pain, we will proceed with a left sacroiliac joint injection. The risks and benefits were discussed in detail with the patient. Patient wants to proceed with the injection. Anticoagulation/NPO Recommendations:   Patient does not need to hold any medications prior to the procedure. Patient will need to be NPO x 8 hours prior to the procedure. We will start an IV prior to the procedure.      Multidisciplinary Pain Management:   In the presence of complex, chronic, and multi-factorial pain, the importance of a multidisciplinary approach to pain management in the patients management regimen was emphasized and discussed in great detail.    PHYSICAL THERAPY: I encouraged the patient to continue her core strengthening exercise program    Referrals:  None    Prescriptions Written This Visit:   Norco 5 mg (#60, 0 refills)    Follow-up: For left SI test injection      Yvonne Reyes, DO  Interventional Pain Management/PM&R   [place]

## 2021-08-19 ENCOUNTER — TELEPHONE (OUTPATIENT)
Dept: PHYSICAL MEDICINE AND REHAB | Age: 76
End: 2021-08-19

## 2021-08-20 RX ORDER — GABAPENTIN 300 MG/1
CAPSULE ORAL
Qty: 180 CAPSULE | Refills: 1 | Status: SHIPPED | OUTPATIENT
Start: 2021-08-20 | End: 2021-11-10 | Stop reason: SDUPTHER

## 2021-08-25 ENCOUNTER — OFFICE VISIT (OUTPATIENT)
Dept: FAMILY MEDICINE CLINIC | Age: 76
End: 2021-08-25
Payer: MEDICARE

## 2021-08-25 VITALS
WEIGHT: 121 LBS | HEIGHT: 57 IN | DIASTOLIC BLOOD PRESSURE: 84 MMHG | SYSTOLIC BLOOD PRESSURE: 122 MMHG | RESPIRATION RATE: 20 BRPM | HEART RATE: 100 BPM | TEMPERATURE: 98.1 F | BODY MASS INDEX: 26.1 KG/M2

## 2021-08-25 DIAGNOSIS — M46.1 SACROILIITIS (HCC): Primary | ICD-10-CM

## 2021-08-25 DIAGNOSIS — M43.06 LUMBAR SPONDYLOLYSIS: ICD-10-CM

## 2021-08-25 DIAGNOSIS — C44.92 SQUAMOUS CELL SKIN CANCER: ICD-10-CM

## 2021-08-25 DIAGNOSIS — C44.519 BASAL CELL CARCINOMA (BCC) OF SKIN OF OTHER PART OF TORSO: ICD-10-CM

## 2021-08-25 DIAGNOSIS — M51.36 DDD (DEGENERATIVE DISC DISEASE), LUMBAR: ICD-10-CM

## 2021-08-25 DIAGNOSIS — R60.0 LOWER EXTREMITY EDEMA: ICD-10-CM

## 2021-08-25 DIAGNOSIS — F41.9 ANXIETY: ICD-10-CM

## 2021-08-25 PROCEDURE — 3017F COLORECTAL CA SCREEN DOC REV: CPT | Performed by: FAMILY MEDICINE

## 2021-08-25 PROCEDURE — 1123F ACP DISCUSS/DSCN MKR DOCD: CPT | Performed by: FAMILY MEDICINE

## 2021-08-25 PROCEDURE — 1090F PRES/ABSN URINE INCON ASSESS: CPT | Performed by: FAMILY MEDICINE

## 2021-08-25 PROCEDURE — G8427 DOCREV CUR MEDS BY ELIG CLIN: HCPCS | Performed by: FAMILY MEDICINE

## 2021-08-25 PROCEDURE — 1036F TOBACCO NON-USER: CPT | Performed by: FAMILY MEDICINE

## 2021-08-25 PROCEDURE — 99214 OFFICE O/P EST MOD 30 MIN: CPT | Performed by: FAMILY MEDICINE

## 2021-08-25 PROCEDURE — G8417 CALC BMI ABV UP PARAM F/U: HCPCS | Performed by: FAMILY MEDICINE

## 2021-08-25 PROCEDURE — 4040F PNEUMOC VAC/ADMIN/RCVD: CPT | Performed by: FAMILY MEDICINE

## 2021-08-25 PROCEDURE — G8399 PT W/DXA RESULTS DOCUMENT: HCPCS | Performed by: FAMILY MEDICINE

## 2021-08-25 RX ORDER — ALPRAZOLAM 0.25 MG/1
0.25 TABLET ORAL NIGHTLY PRN
COMMUNITY
End: 2021-11-10 | Stop reason: ALTCHOICE

## 2021-08-25 RX ORDER — KETOTIFEN FUMARATE 0.35 MG/ML
1 SOLUTION/ DROPS OPHTHALMIC 2 TIMES DAILY
COMMUNITY

## 2021-08-25 RX ORDER — TRAMADOL HYDROCHLORIDE 50 MG/1
25 TABLET ORAL EVERY 6 HOURS PRN
Qty: 28 TABLET | Refills: 0 | Status: CANCELLED | OUTPATIENT
Start: 2021-08-25 | End: 2021-09-01

## 2021-08-25 ASSESSMENT — ENCOUNTER SYMPTOMS
DIARRHEA: 0
BACK PAIN: 1
VOMITING: 0
SHORTNESS OF BREATH: 0
ABDOMINAL PAIN: 0
CHEST TIGHTNESS: 0
SORE THROAT: 0
NAUSEA: 0
COUGH: 0
EYES NEGATIVE: 1
RHINORRHEA: 0

## 2021-08-25 NOTE — PROGRESS NOTES
[address] [place] [address]  Dept: [phone]  Dept Fax: [phone]  Loc: [phone]  PROGRESS NOTE      VisitDate: 8/25/2021    Karen Betts is a 78 y.o. female who presents today for:     Chief Complaint   Patient presents with    Cellulitis     right calf above ankle- its red and swollen s/p SCC removed 7/7/21 by Dr. Sherly Dallas.  Lower Back Pain     Chronic left sided low back pain, Left SI joint pain-had 4 injections by Dr. Lisa Vines to do another injection and ablation. Wants to update you     Cyst     ? cyst on right chest above bra. Has appt 9/27 with Derm in [facility].  Discuss Medications     states Dr. Vinay Sawyer has her trying Norco for a month, she doesn't want to get addicted and it just takes the edge off. She is requesting Rx for tramadol          Subjective:  HPI  Patient presents with redness and swelling in her right ankle. She had surgery to remove squamous cell skin cancer in this area. She has had some redness and inflammation for several weeks. She was given antibiotics after the procedure. Since then she had continued redness. Area of redness not spreading. Its not hot or warm. Swelling gets worse the day goes on and is better in the morning. She also had questions about her low back pain. Dr. Allyson Rojas had informed her she was a surgical candidate. They did a trial of injection she got no relief. She is planning on having some sacroiliac injections and was offered possible nerve ablation and we discussed that this is an appropriate progression of care. Review of Systems   Constitutional: Negative for activity change, appetite change, fatigue and fever. HENT: Negative for congestion, rhinorrhea and sore throat. Eyes: Negative. Respiratory: Negative for cough, chest tightness and shortness of breath. Cardiovascular: Negative for chest pain and palpitations. Left Lower Leg with flap,    SCC Right lower leg  7/7/21    TOTAL NEPHRECTOMY  2006    right     Family History   Problem Relation Age of Onset   Nannette Moody Cancer Mother         melanoma, tongue    Hypertension Mother     High Cholesterol Mother     Heart Failure Father      Social History     Tobacco Use    Smoking status: Never Smoker    Smokeless tobacco: Never Used   Substance Use Topics    Alcohol use: No      Current Outpatient Medications   Medication Sig Dispense Refill    ketotifen (ZADITOR) 0.025 % ophthalmic solution Place 1 drop into both eyes 2 times daily      Propylene Glycol (SYSTANE COMPLETE OP) Apply 1 drop to eye 2 times daily      ALPRAZolam (XANAX) 0.25 MG tablet Take 0.25 mg by mouth nightly as needed for Sleep.  gabapentin (NEURONTIN) 300 MG capsule TAKE 1 CAPSULE BY MOUTH TWICE DAILY 180 capsule 1    HYDROcodone-acetaminophen (NORCO) 5-325 MG per tablet Take 1 tablet by mouth 2 times daily as needed for Pain for up to 30 days. 60 tablet 0    calcitonin (MIACALCIN) 200 UNIT/ACT nasal spray PLACE 1 SPRAY IN ONE NOSTRIL ONCE DAILY ALTERNATING NOSTRILS DAILY 11.1 mL 3    atorvastatin (LIPITOR) 10 MG tablet TAKE 1 TABLET BY MOUTH EVERY DAY 90 tablet 3    losartan (COZAAR) 25 MG tablet TAKE 1 TABLET BY MOUTH EVERY DAY 90 tablet 1    traMADol (ULTRAM) 50 MG tablet Take 25 mg by mouth every 6 hours as needed for Pain.  lansoprazole (PREVACID SOLUTAB) 15 MG disintegrating tablet Take 15 mg by mouth daily      acetaminophen (TYLENOL) 500 MG tablet Take 500 mg by mouth as needed       Calcium + D (CALTRATE) 600-200 MG-UNIT tablet Take 2 tablets by mouth daily.  aspirin 81 MG EC tablet Take 81 mg by mouth daily.  therapeutic multivitamin-minerals (THERAGRAN-M) tablet Take 1 tablet by mouth daily. No current facility-administered medications for this visit.      Allergies   Allergen Reactions    Cipro Xr Swelling and Rash    Dye [Iodides]      Patient only has one kidney     Health Maintenance   Topic Date Due    DTaP/Tdap/Td vaccine (1 - Tdap) 01/02/1997    Shingles Vaccine (2 of 3) 06/12/2015    Annual Wellness Visit (AWV)  08/28/2021    Flu vaccine (1) 09/01/2021    Potassium monitoring  03/04/2022    Creatinine monitoring  03/04/2022    Lipid screen  04/06/2022    Breast cancer screen  05/13/2023    Colon cancer screen colonoscopy  04/24/2025    DEXA (modify frequency per FRAX score)  Completed    Pneumococcal 65+ years Vaccine  Completed    COVID-19 Vaccine  Completed    Hepatitis C screen  Completed    Hepatitis A vaccine  Aged Out    Hepatitis B vaccine  Aged Out    Hib vaccine  Aged Out    Meningococcal (ACWY) vaccine  Aged Out         Objective:     Physical Exam  Constitutional:       General: She is not in acute distress. Appearance: She is well-developed. She is not diaphoretic. HENT:      Head: Normocephalic and atraumatic. Eyes:      General: No scleral icterus. Conjunctiva/sclera: Conjunctivae normal.   Neck:      Thyroid: No thyromegaly. Vascular: No JVD. Comments: No bruits  Cardiovascular:      Rate and Rhythm: Normal rate and regular rhythm. Heart sounds: Normal heart sounds. Pulmonary:      Effort: Pulmonary effort is normal. No respiratory distress. Breath sounds: Normal breath sounds. No wheezing or rales. Abdominal:      Palpations: Abdomen is soft. There is no mass. Tenderness: There is no abdominal tenderness. There is no guarding. Musculoskeletal:         General: Tenderness present. Skin:     General: Skin is warm and dry. Findings: No rash. Comments: Nodular lesion right anterior chest wall 5 mm in diameter concerning for basal cell   Neurological:      Mental Status: She is alert and oriented to person, place, and time. Cranial Nerves: No cranial nerve deficit.        /84   Pulse 100   Temp 98.1 °F (36.7 °C) (Oral)   Resp 20   Ht 4' 9\" (1.448 m)   Wt 121 lb (54.9 kg)   BMI 26.18 kg/m²       Impression/Plan:  1. Sacroiliitis ([address]    2. Anxiety    3. Squamous cell skin cancer    4. Lower extremity edema    5. Basal cell carcinoma (BCC) of skin of other part of torso    6. DDD (degenerative disc disease), lumbar    7. Lumbar spondylolysis        No orders of the defined types were placed in this encounter. Cont norco.  Has derm appt  Fu with painmgmt  Patient giveneducational materials - see patient instructions. Discussed use, benefit, and side effects of prescribed medications. All patient questions answered. Pt voiced understanding. Reviewed health maintenance. Patient agreedwith treatment plan. Follow up as directed. **This report has been created using voice recognition software. It may contain minor errorswhich are inherent in voice recognition technology. **       Electronically signed by Michael Farr MD on 8/25/2021 at 10:28 AM

## 2021-08-25 NOTE — TELEPHONE ENCOUNTER
[facility] was denied by patients insurance. Scanned into media. Per [facility] patient did  [place] on 8/20/21.

## 2021-08-25 NOTE — TELEPHONE ENCOUNTER
After calling the patient she stated that her prescription drug coverage is through [facility]. She stated that she will bring in the card when she goes for her procedure with you on 9/14/21.

## 2021-09-12 NOTE — H&P
Today, patient presents for planned left sacroiliac joint injection. This note is reflective of the patient's previous visit for evaluation. We will proceed with today's planned procedure. Since patient's last visit for evaluation, there have been no interval changes in medical history. Patient has no new numbness, weakness, or focal neurological deficit since evaluation. Patient has no contraindications to injection (no anticoagulation or recent antibiotic intake for active infections), and has a  present or is able to drive themselves (as discussed and cleared by physician). Allergies to latex, contrast dye, and steroid medications have been confirmed with the patient prior to the procedure. NPO necessity has been assessed and accepted based on procedure complexity. The risks and benefits of the procedure have been explained including but are not limited to infection, bleeding, paralysis, immediate post procedure weakness, and dizziness; the patient acknowledges understanding and desires to proceed with the procedure. Patient has signed consent for same procedure as discussed in previous clinic encounter. All other questions and concerns were addressed at bedside. See procedure note for full details. Post procedure Instructions: The patient was advised not to drive during the day of the procedure and not to engage in any significant decision making (unless otherwise states by physician). The patient was also advised to be cautious with walking/activity for 24 hours following today's visit and asked not to engage in over-exertion (unless otherwise states by physician). After this time, it is ok to resume pre-procedure level of activity. Patient advised to apply ice to site of injection in situations of pain and discomfort. Patient advised to not submerge site of injection during bath or pool activities for approximately 24 hours post-procedure.  Patient attested to understanding post procedure any new true focal leg weakness or leg paresthesias associated with this. She continues to take her medications as prescribed and states that she has been on Norco in the past with good benefit. He states she does not like to take any strong medications due to her sensitivity to medications. History of Intervention:   Surgery: No previous low back surgeries  Injections: Left SI joint injection (12/1/20) - significant relief (>90%) x 2 days  Caudal YAA (3/30/2021)-greater than 50% pain relief x 6 weeks  Left L4 and L5 TFESI (8/3/21) - minimal relief    Current Treatment Medications:   Tylenol PRN  Tramadol 25 mg (every 3rd day)  Gabapentin 300 mg BID    Historical Treatment Medications:   None    Imaging:  MRI L-spine (9/10/20)    PROCEDURE: MRI LUMBAR SPINE WO CONTRAST         CLINICAL INFORMATION: Lumbar radiculopathy. Low back pain radiating into left hip and groin for 7 months after fall.         COMPARISON: Lumbar spine radiographs dated 6/22/2020.         TECHNIQUE: Sagittal and axial T1 and T2-weighted images were obtained through the lumbar spine.         FINDINGS:    There is a dextrocurvature of the lumbar spine, similar to prior radiographs. There is grade 1 anterolisthesis of L4 relative to L5 and grade 2 anterolisthesis of L5 relative to S1 on the basis of degenerative change, also stable compared to prior    radiographs. There is otherwise anatomic vertebral body height and alignment. There is disc space narrowing and mild osteophytosis at T11-12 with hyperintense T1 and T2 signal at the opposing endplates as evidence for Modic 2 degenerative change. Marrow    signal is otherwise within normal limits. The conus terminates in a normal fashion at the L1-2 level. Paraspinal soft tissues are unremarkable. At T12-L1 there is no significant spinal canal or neuroforaminal stenosis. At L1-2 there is no significant spinal canal or neuroforaminal stenosis.     At L2-3 there is a shallow disc bulge without significant spinal canal or neuroforaminal stenosis. At L3-4 there is a shallow disc bulge without significant spinal canal or neuroforaminal stenosis. At L4-5 there is partial uncovering of the disc with superimposed shallow disc bulge which contributes to mild spinal canal stenosis in association with facet hypertrophy and ligament flavum thickening. There is mild to moderate bilateral neural    foraminal stenosis. At L5-S1 there is partial uncovering of the disc with superimposed shallow disc bulge which contributes to moderate spinal canal stenosis in association with facet hypertrophy and ligamentum flavum thickening. There is flattening of the neural foramina    with moderate bilateral neural foraminal stenosis.           Impression     Multilevel degenerative changes most pronounced at L5-S1 where prominent facet hypertrophy and ligamentum flavum thickening cause grade 2 anterolisthesis of L5 relative to S1 with partial uncovering the disc with superimposed disc bulge contributing to    moderate spinal canal stenosis and moderate bilateral neural foraminal stenosis.                  XR Left Hip (6/2/20)    PROCEDURE: XR HIP LEFT (2-3 VIEWS)       CLINICAL INFORMATION: Left hip pain.       COMPARISON: No prior study.       TECHNIQUE: AP and frog-leg lateral views of the left hip performed.           FINDINGS:    POST OPERATIVE: None.       ALIGNMENT: Anatomic.       MINERALIZATION: Normal.       FRACTURE: None.       HIP JOINT: Unremarkable.       LEFT SACROILIAC JOINT:    1. Mild sclerosis with associated erosive changes along the sacral and iliac margins of the visualized portions of the left sacroiliac joint consistent with a chronic sacroiliitis.       SYMPHYSIS:    1. Mild degenerative sclerosis at the symphysis.        Past Medical History:   Diagnosis Date    Angiomyolipoma     left kidney    Arthritis     hands    Blood transfusion reaction     Cancer (HCC)     left breast    Cancer of skin     Chronic kidney disease     Stage III    GERD (gastroesophageal reflux disease)     Hyperlipidemia     Hypertension     Osteopenia     Osteoporosis     Prolonged emergence from general anesthesia        Past Surgical History:   Procedure Laterality Date    APPENDECTOMY  1971    BREAST LUMPECTOMY  2009    left    BREAST SURGERY  70's    left biopsy    BREAST SURGERY  2009    Lumpectomy of the Left breast   [address]  2006    COLONOSCOPY  2004    [place] INJECTION PROCEDURE FOR SACROILIAC JOINT Left 12/1/2020    left SI joint injection performed by Melissa Everett DO at [address] [address]    HIP SURGERY Left 5/25/2021    left intra-articular hip injection performed by Melissa Everett DO at [address] [place] [address] Left 2021    LEG SURGERY Right 07/07/2021    cancer removed from right leg    PAIN MANAGEMENT PROCEDURE N/A 3/30/2021    caudal epidural steroid injection performed by Melissa Everett DO at [place] [address] Left 8/3/2021    LEFT L4 and L5 transforaminal epidural steroid injection performed by Melissa Everett DO at [facility] Right 06/2013    Squemous cell cancer Right leg    SKIN BIOPSY Left 12/02/2013    Squemous cell cancer Left leg    SKIN CANCER EXCISION Left 12/02/2013    Exc SCC Left Lower Leg with flap,    SCC Right lower leg  7/7/21    TOTAL NEPHRECTOMY  2006    right       Family History   Problem Relation Age of Onset    Cancer Mother         melanoma, tongue    Hypertension Mother     High Cholesterol Mother     Heart Failure Father        Social History     Socioeconomic History    Marital status:      Spouse name: Not on file    Number of children: Not on file    Years of education: Not on file    Highest education level: Not on file   Occupational History Employer: [employer]   Tobacco Use    Smoking status: Never Smoker    Smokeless tobacco: Never Used   Vaping Use    Vaping Use: Never used   Substance and Sexual Activity    Alcohol use: No    Drug use: No    Sexual activity: Not Currently   Other Topics Concern    Not on file   Social History Narrative    Not on file     Social Determinants of Health     Financial Resource Strain:     Difficulty of Paying Living Expenses:    Food Insecurity:     Worried About Running Out of Food in the Last Year:     Ran Out of Food in the Last Year:    Transportation Needs:     Lack of Transportation (Medical):      Lack of Transportation (Non-Medical):    Physical Activity:     Days of Exercise per Week:     Minutes of Exercise per Session:    Stress:     Feeling of Stress :    Social Connections:     Frequency of Communication with Friends and Family:     Frequency of Social Gatherings with Friends and Family:     Attends Alevism Services:     Active Member of Clubs or Organizations:     Attends Club or Organization Meetings:     Marital Status:    Intimate Partner Violence:     Fear of Current or Ex-Partner:     Emotionally Abused:     Physically Abused:     Sexually Abused:        Medications & Allergies:   Current Outpatient Medications   Medication Instructions    acetaminophen (TYLENOL) 500 mg, Oral, PRN    ALPRAZolam (XANAX) 0.25 mg, Oral, NIGHTLY PRN    aspirin 81 mg, DAILY    atorvastatin (LIPITOR) 10 MG tablet TAKE 1 TABLET BY MOUTH EVERY DAY    calcitonin (MIACALCIN) 200 UNIT/ACT nasal spray PLACE 1 SPRAY IN ONE NOSTRIL ONCE DAILY ALTERNATING NOSTRILS DAILY    Calcium + D (CALTRATE) 600-200 MG-UNIT tablet 2 tablets, DAILY    gabapentin (NEURONTIN) 300 MG capsule TAKE 1 CAPSULE BY MOUTH TWICE DAILY    HYDROcodone-acetaminophen (NORCO) 5-325 MG per tablet 1 tablet, Oral, 2 TIMES DAILY PRN    ketotifen (ZADITOR) 0.025 % ophthalmic solution 1 drop, Both Eyes, 2 TIMES ARTHRGRPHY&/ANES/STEROID W/IMAGE   2. Other chronic pain  HYDROcodone-acetaminophen (NORCO) 5-325 MG per tablet   3. Leg length discrepancy     4. Chronic left-sided low back pain with left-sided sciatica         Noah Rodriguez is a 78 y. o.female presenting to the pain clinic for evaluation of chronic left buttocks and anterior groin pain. Her clinical history and physical examination show a mixed pattern of pain that could be potentially coming from the lumbar spine from spinal stenosis versus SI joint dysfunction versus left intrinsic hip pathology. She failed to respond to a left SI joint injection and has continued anterior groin pain. I have set her up for a caudal epidural steroid injection to see if this is potentially coming from the spine particularly from the L4/L5 and L5/S1 level as any interlaminar or transforaminal injection at these levels would be very difficult to access the epidural space. She continues to appear to have severe left SI joint dysfunction/pain. She appears to not respond to steroids. I have set her up for a left SI joint test block. We will determine her response after this injection and then set her up for an SI ablation if she responds well to this. I have provided her with Norco for breakthrough pain as needed. Plan: The following treatment recommendations and plan were discussed in detail with Noah Rodriguez. Imaging:   I have reviewed patients imaging of MRI L-spine and left hip XR and results were discussed with patient today. Analgesics:   Patient is taking Acetaminophen. Patient informed that the maximum amount of acetaminophen taken on a regular basis should only be 4000 mg per day. Adjuvants:   None    Interventions: With examination consistent with left sacroiliac dysfunction/pain, we will proceed with a left sacroiliac joint injection. The risks and benefits were discussed in detail with the patient.   Patient wants to proceed with the injection. Anticoagulation/NPO Recommendations:   Patient does not need to hold any medications prior to the procedure. Patient will need to be NPO x 8 hours prior to the procedure. We will start an IV prior to the procedure. Multidisciplinary Pain Management:   In the presence of complex, chronic, and multi-factorial pain, the importance of a multidisciplinary approach to pain management in the patients management regimen was emphasized and discussed in great detail.    PHYSICAL THERAPY: I encouraged the patient to continue her core strengthening exercise program    Referrals:  None    Prescriptions Written This Visit:   Norco 5 mg (#60, 0 refills)    Follow-up: For left SI test injection      Katie Callejas,   Interventional Pain Management/PM&R   [place]

## 2021-09-14 ENCOUNTER — HOSPITAL ENCOUNTER (OUTPATIENT)
Age: 76
Setting detail: OUTPATIENT SURGERY
Discharge: HOME | End: 2021-09-14
Attending: ANESTHESIOLOGY | Admitting: ANESTHESIOLOGY
Payer: MEDICARE

## 2021-09-14 ENCOUNTER — APPOINTMENT (OUTPATIENT)
Dept: GENERAL RADIOLOGY | Age: 76
End: 2021-09-14
Attending: ANESTHESIOLOGY
Payer: MEDICARE

## 2021-09-14 VITALS
TEMPERATURE: 98 F | SYSTOLIC BLOOD PRESSURE: 120 MMHG | WEIGHT: 119 LBS | HEART RATE: 88 BPM | BODY MASS INDEX: 25.67 KG/M2 | OXYGEN SATURATION: 97 % | DIASTOLIC BLOOD PRESSURE: 76 MMHG | HEIGHT: 57 IN | RESPIRATION RATE: 16 BRPM

## 2021-09-14 PROCEDURE — 6360000002 HC RX W HCPCS: Performed by: ANESTHESIOLOGY

## 2021-09-14 PROCEDURE — 2500000003 HC RX 250 WO HCPCS: Performed by: ANESTHESIOLOGY

## 2021-09-14 PROCEDURE — 27096 INJECT SACROILIAC JOINT: CPT | Performed by: ANESTHESIOLOGY

## 2021-09-14 PROCEDURE — 7100000010 HC PHASE II RECOVERY - FIRST 15 MIN: Performed by: ANESTHESIOLOGY

## 2021-09-14 PROCEDURE — 3600000054 HC PAIN LEVEL 3 BASE: Performed by: ANESTHESIOLOGY

## 2021-09-14 PROCEDURE — 3600000055 HC PAIN LEVEL 3 ADDL 15 MIN: Performed by: ANESTHESIOLOGY

## 2021-09-14 PROCEDURE — 99152 MOD SED SAME PHYS/QHP 5/>YRS: CPT | Performed by: ANESTHESIOLOGY

## 2021-09-14 PROCEDURE — 6360000004 HC RX CONTRAST MEDICATION: Performed by: ANESTHESIOLOGY

## 2021-09-14 PROCEDURE — 2709999900 HC NON-CHARGEABLE SUPPLY: Performed by: ANESTHESIOLOGY

## 2021-09-14 PROCEDURE — 7100000011 HC PHASE II RECOVERY - ADDTL 15 MIN: Performed by: ANESTHESIOLOGY

## 2021-09-14 PROCEDURE — 3209999900 FLUORO FOR SURGICAL PROCEDURES

## 2021-09-14 RX ORDER — DEXAMETHASONE SODIUM PHOSPHATE 4 MG/ML
INJECTION, SOLUTION INTRA-ARTICULAR; INTRALESIONAL; INTRAMUSCULAR; INTRAVENOUS; SOFT TISSUE PRN
Status: DISCONTINUED | OUTPATIENT
Start: 2021-09-14 | End: 2021-09-14 | Stop reason: ALTCHOICE

## 2021-09-14 RX ORDER — MIDAZOLAM HYDROCHLORIDE 1 MG/ML
INJECTION INTRAMUSCULAR; INTRAVENOUS PRN
Status: DISCONTINUED | OUTPATIENT
Start: 2021-09-14 | End: 2021-09-14 | Stop reason: ALTCHOICE

## 2021-09-14 RX ORDER — BUPIVACAINE HYDROCHLORIDE 5 MG/ML
INJECTION, SOLUTION PERINEURAL PRN
Status: DISCONTINUED | OUTPATIENT
Start: 2021-09-14 | End: 2021-09-14 | Stop reason: ALTCHOICE

## 2021-09-14 RX ORDER — FENTANYL CITRATE 50 UG/ML
INJECTION, SOLUTION INTRAMUSCULAR; INTRAVENOUS PRN
Status: DISCONTINUED | OUTPATIENT
Start: 2021-09-14 | End: 2021-09-14 | Stop reason: ALTCHOICE

## 2021-09-14 RX ORDER — LIDOCAINE HYDROCHLORIDE 10 MG/ML
INJECTION, SOLUTION EPIDURAL; INFILTRATION; INTRACAUDAL; PERINEURAL PRN
Status: DISCONTINUED | OUTPATIENT
Start: 2021-09-14 | End: 2021-09-14 | Stop reason: ALTCHOICE

## 2021-09-14 ASSESSMENT — PAIN - FUNCTIONAL ASSESSMENT: PAIN_FUNCTIONAL_ASSESSMENT: 0-10

## 2021-09-14 ASSESSMENT — PAIN DESCRIPTION - DESCRIPTORS: DESCRIPTORS: CONSTANT;RADIATING

## 2021-09-14 ASSESSMENT — PAIN SCALES - GENERAL: PAINLEVEL_OUTOF10: 0

## 2021-09-14 NOTE — PROCEDURES
Pre-operative Diagnosis:  Left SI joint pain     Post-operative Diagnosis: Left SI joint pain     Procedure: Left SI joint injection/block     Procedure Description:  After having signed the informed consent, the patient was placed in the prone position. The patient's back was prepped with chloraprep solution, and draped in a sterile fashion. A total of 2 ml of 1% lidocaine were used to anesthetize the skin and underlying tissues. Under fluoroscopic guidance a single 22-gauge, 3.5 inch spinal needle was advanced to lie within the inferior pole of the left sacroiliac joint. There were no paresthesias or heme aspiration. Needle placement was confirmed in the AP view. After negative aspiration, 0.5 ml of Omnipaque 300 contrast was injected with appropriate spread observed. A total of 5 ml of 0.5% bupivacaine were injected into the sacroiliac joint. The needle was withdrawn without any complications. The patient tolerated the procedure well, was transported to the recovery room and observed for 15 minutes and discharged in an ambulatory fashion. No immediate reported complications.     Procedural Complications: None  Estimated Blood Loss: 0 mL    IV sedation was used during the procedure:  - Moderate intravenous conscious sedation was supervised by Dr. Giovana Lind  - The patient was independently monitored by a Registered Nurse assigned to the procedure room  - Monitoring included automated blood pressure, continuous EKG, and continuous pulse oximetry  - The detailed conscious record is permanently stored in the [place] [address]  - The following is the conscious sedation record:  Start Time: 09:15  End Time : 09:30  Duration: 15 minutes   Medications Administered: 1 mg Versed, 50 mcg Fentanyl    Teofilo Rosa DO  Interventional Pain Management/PM&R   [place]

## 2021-09-14 NOTE — PRE SEDATION
[address] [address]  Pre-Sedation/Analgesia History & Physical    Pt Name: Teri King  MRN: [id number]  YOB: 1945  Provider Performing Procedure: Laura Sethi DO   Primary Care Physician: Zofia Prado MD      MEDICAL HISTORY       has a past medical history of Angiomyolipoma, Arthritis, Blood transfusion reaction, Cancer ([address] Cancer of skin, Chronic kidney disease, GERD (gastroesophageal reflux disease), Hyperlipidemia, Hypertension, Osteopenia, Osteoporosis, and Prolonged emergence from general anesthesia. SURGICAL HISTORY   has a past surgical history that includes Appendectomy ();  section ( and ); Hysterectomy (); Hemorrhoid surgery (); total nephrectomy (); Cholecystectomy (); Breast surgery (); Breast surgery (); Colonoscopy (); Breast lumpectomy (); Skin cancer excision (Left, 2013); skin biopsy (Right, 2013); skin biopsy (Left, 2013); Injection Procedure For Sacroiliac Joint (Left, 2020); Knee cartilage surgery (Left, ); Pain management procedure (N/A, 3/30/2021); hip surgery (Left, 2021); Leg Surgery (Right, 2021); and Pain management procedure (Left, 8/3/2021). ALLERGIES   Allergies as of 2021 - Fully Reviewed 2021   Allergen Reaction Noted    Cipro xr Swelling and Rash 2011    Dye [iodides]  2020       MEDICATIONS   Prior to Admission medications    Medication Sig Start Date End Date Taking? Authorizing Provider   ketotifen (ZADITOR) 0.025 % ophthalmic solution Place 1 drop into both eyes 2 times daily   Yes Historical Provider, MD   Propylene Glycol (SYSTANE COMPLETE OP) Apply 1 drop to eye 2 times daily   Yes Historical Provider, MD   ALPRAZolam (XANAX) 0.25 MG tablet Take 0.25 mg by mouth nightly as needed for Sleep.    Yes Historical Provider, MD   gabapentin (NEURONTIN) 300 MG capsule TAKE 1 CAPSULE BY MOUTH TWICE DAILY 21 Yes Manisha DAVIS Netta Murray MD   HYDROcodone-acetaminophen (NORCO) 5-325 MG per tablet Take 1 tablet by mouth 2 times daily as needed for Pain for up to 30 days. 8/18/21 9/17/21 Yes Teofilo Olguin DO   calcitonin (MIACALCIN) 200 UNIT/ACT nasal spray PLACE 1 SPRAY IN ONE NOSTRIL ONCE DAILY ALTERNATING NOSTRILS DAILY 4/6/21  Yes Jasbir Martinez MD   atorvastatin (LIPITOR) 10 MG tablet TAKE 1 TABLET BY MOUTH EVERY DAY 3/31/21  Yes Jasbir Martinez MD   losartan (COZAAR) 25 MG tablet TAKE 1 TABLET BY MOUTH EVERY DAY 3/30/21  Yes Jasbir Martinez MD   lansoprazole (PREVACID SOLUTAB) 15 MG disintegrating tablet Take 15 mg by mouth daily   Yes Historical Provider, MD   acetaminophen (TYLENOL) 500 MG tablet Take 500 mg by mouth as needed    Yes Historical Provider, MD   Calcium + D (CALTRATE) 600-200 MG-UNIT tablet Take 2 tablets by mouth daily. Yes Historical Provider, MD   aspirin 81 MG EC tablet Take 81 mg by mouth daily. Yes Historical Provider, MD   therapeutic multivitamin-minerals (THERAGRAN-M) tablet Take 1 tablet by mouth daily. Yes Historical Provider, MD   traMADol (ULTRAM) 50 MG tablet Take 25 mg by mouth every 6 hours as needed for Pain. Historical Provider, MD     PHYSICAL:   Vitals:    09/14/21 0924   BP: 120/76   Pulse: 88   Resp: 16   Temp: 98 °F (36.7 °C)   SpO2: 97%     PLANNED PROCEDURE   See procedure note  SEDATION  Planned agent: Versed and Fentanyl  ASA Classification: 2  Class 1: A normal healthy patient  Class 2: Pt with mild to moderate systemic disease  Class 3: Severe systemic disease or disturbance  Class 4: Severe systemic disorders that are already life threatening. Class 5: Moribund pt with little chances of survival, for more than 24 hours. Mallampati I Airway Classification: 2    1. Pre-procedure diagnostic studies complete and results available. 2. Previous sedation/anesthesia experiences assessed.   3. The patient is an appropriate candidate to undergo the planned procedure sedation and anesthesia. (Refer to nursing sedation/analgesia documentation record)  4. Formulation and discussion of sedation/procedure plan, risks, and expectations with patient and/or responsible adult completed. 5. Patient examined immediately prior to the procedure.  (Refer to nursing sedation/analgesia documentation record)    Melissa Everett DO  Electronically signed 9/14/2021 at 10:38 AM

## 2021-09-14 NOTE — POST SEDATION
[address] [address]  Sedation/Analgesia Post Sedation Record    Pt Name: Cj Lerner  MRN: [id number]  YOB: 1945  Procedure Performed By: Hannah Jonas DO  Primary Care Physician: Mona Richey MD    POST-PROCEDURE    Physicians/Assistants: Hannah Jonas DO  Procedure Performed: See Procedure Note   Sedation/Anesthesia: Versed and Fentanyl (See procedure note for amount and duration)  Estimated Blood Loss:     0  ml  Specimens Removed: None        Complications: None           Teofilo Browne DO  Electronically signed 9/14/2021 at 10:38 AM

## 2021-09-14 NOTE — PROGRESS NOTES
[address] [facility] PHASE 2 . AWAKE ALERT AND ORIENTED . PULSE OX 97%  DENIES PAIN A THIS TIME . INJECTION SITE CLEAN AND DRY. [address]  ERIN IN TO SEE  0940 UP AT SIDE OF BED TO GET DRESSED  46 DISCHARGED HOME VIA PRIVATE CAR WITHOUT COMPLAINT

## 2021-09-16 ENCOUNTER — OFFICE VISIT (OUTPATIENT)
Dept: PHYSICAL MEDICINE AND REHAB | Age: 76
End: 2021-09-16
Payer: MEDICARE

## 2021-09-16 VITALS
WEIGHT: 119 LBS | SYSTOLIC BLOOD PRESSURE: 126 MMHG | BODY MASS INDEX: 25.67 KG/M2 | DIASTOLIC BLOOD PRESSURE: 78 MMHG | HEIGHT: 57 IN

## 2021-09-16 DIAGNOSIS — M21.70 LEG LENGTH DISCREPANCY: ICD-10-CM

## 2021-09-16 DIAGNOSIS — M25.552 LEFT HIP PAIN: Primary | ICD-10-CM

## 2021-09-16 DIAGNOSIS — G89.29 OTHER CHRONIC PAIN: ICD-10-CM

## 2021-09-16 DIAGNOSIS — G89.29 CHRONIC LEFT SI JOINT PAIN: ICD-10-CM

## 2021-09-16 DIAGNOSIS — M53.3 CHRONIC LEFT SI JOINT PAIN: ICD-10-CM

## 2021-09-16 PROCEDURE — G8427 DOCREV CUR MEDS BY ELIG CLIN: HCPCS | Performed by: ANESTHESIOLOGY

## 2021-09-16 PROCEDURE — G8399 PT W/DXA RESULTS DOCUMENT: HCPCS | Performed by: ANESTHESIOLOGY

## 2021-09-16 PROCEDURE — 99214 OFFICE O/P EST MOD 30 MIN: CPT | Performed by: ANESTHESIOLOGY

## 2021-09-16 PROCEDURE — 1090F PRES/ABSN URINE INCON ASSESS: CPT | Performed by: ANESTHESIOLOGY

## 2021-09-16 PROCEDURE — 1123F ACP DISCUSS/DSCN MKR DOCD: CPT | Performed by: ANESTHESIOLOGY

## 2021-09-16 PROCEDURE — 1036F TOBACCO NON-USER: CPT | Performed by: ANESTHESIOLOGY

## 2021-09-16 PROCEDURE — 3017F COLORECTAL CA SCREEN DOC REV: CPT | Performed by: ANESTHESIOLOGY

## 2021-09-16 PROCEDURE — G8417 CALC BMI ABV UP PARAM F/U: HCPCS | Performed by: ANESTHESIOLOGY

## 2021-09-16 PROCEDURE — 4040F PNEUMOC VAC/ADMIN/RCVD: CPT | Performed by: ANESTHESIOLOGY

## 2021-09-16 RX ORDER — HYDROCODONE BITARTRATE AND ACETAMINOPHEN 5; 325 MG/1; MG/1
1 TABLET ORAL 2 TIMES DAILY PRN
Qty: 60 TABLET | Refills: 0 | Status: SHIPPED | OUTPATIENT
Start: 2021-09-17 | End: 2021-10-08 | Stop reason: SDUPTHER

## 2021-09-16 NOTE — PROGRESS NOTES
Chronic Pain/PM&R Clinic Note     Encounter Date: 9/16/21    Subjective:   Chief Complaint:   Chief Complaint   Patient presents with    Follow-up       History of Present Illness:   Cornelia Cloud is a 78 y.o. female seen in the clinic initially on 11/16/20 upon request from Breana Miller DO (orthopedic surgery) for her history of chronic left groin and posterior thigh pain. She states that her pain started after a fall on ice back in February 2020. She describes her pain as a constant ache in the left buttocks with radiation down the posterior thigh and in to the right groin down the front of the thigh. She states that the pain is typically 4/10 on a day-to-day basis. She states she saw a chiropractor when initially happened which did give her some relief but then her pain returned at the end of April. She denies any focal leg weakness, paresthesias, saddle anesthesia, bowel/bladder incontinence. Her pain comes and goes and she is not sure what exacerbates her pain. She does get relief with the occasional use of Tylenol and tramadol. She states that she did actually suffer a meniscus tear about 2 weeks ago and is slated to have an MRI next week for evaluation. She states she has completed physical therapy for her low back and continues with home core strengthening exercises. Today, 9/16/2021, patient presents for planned follow-up for chronic left hip pain. She underwent a left SI joint injection block on 9/14/2021. She reports significant relief (greater than 90%) in her left posterior hip pain for 2 days. She continues to have less pain in that area but still has been dealing with severe anterior groin pain. She feels like these 2 pain generators are separate issues. She states that she would like to have an MRI of the left hip as previously discussed to delineate what is going on with her intrinsic hip.   She would like to hold off on any SI joint ablation at this point till we see the MRI results of the hip. She continues to obtain good benefit on her Norco.  She denies any side effects on this medication. She denies any new radicular leg pain, new leg paresthesias, saddle anesthesia, bowel/bladder incontinence. History of Intervention:   Surgery: No previous low back surgeries  Injections: Left SI joint injection (12/1/20) - significant relief (>90%) x 2 days  Caudal YAA (3/30/2021)greater than 50% pain relief x 6 weeks  Left intra-articular hip inj (5/25/21) - minimal relief  Left L4 and L5 TFESI (8/3/21) - minimal relief  Left SI joint block (9/14/21) - >90% relief in posterior hip pain    Current Treatment Medications:   Norco 5 mg BID PRN -prescribed by me  Tylenol PRN  Gabapentin 300 mg BID    Historical Treatment Medications:   Tramadol - stopped by me    Imaging:  MRI Lspine (9/10/20)    PROCEDURE: MRI LUMBAR SPINE WO CONTRAST         CLINICAL INFORMATION: Lumbar radiculopathy. Low back pain radiating into left hip and groin for 7 months after fall.         COMPARISON: Lumbar spine radiographs dated 6/22/2020.         TECHNIQUE: Sagittal and axial T1 and T2-weighted images were obtained through the lumbar spine.         FINDINGS:    There is a dextrocurvature of the lumbar spine, similar to prior radiographs. There is grade 1 anterolisthesis of L4 relative to L5 and grade 2 anterolisthesis of L5 relative to S1 on the basis of degenerative change, also stable compared to prior    radiographs. There is otherwise anatomic vertebral body height and alignment. There is disc space narrowing and mild osteophytosis at T11-12 with hyperintense T1 and T2 signal at the opposing endplates as evidence for Modic 2 degenerative change. Marrow    signal is otherwise within normal limits. The conus terminates in a normal fashion at the L1-2 level. Paraspinal soft tissues are unremarkable. At T12-L1 there is no significant spinal canal or neuroforaminal stenosis.     At L1-2 there is no significant spinal canal or neuroforaminal stenosis. At L2-3 there is a shallow disc bulge without significant spinal canal or neuroforaminal stenosis. At L3-4 there is a shallow disc bulge without significant spinal canal or neuroforaminal stenosis. At L4-5 there is partial uncovering of the disc with superimposed shallow disc bulge which contributes to mild spinal canal stenosis in association with facet hypertrophy and ligament flavum thickening. There is mild to moderate bilateral neural    foraminal stenosis. At L5-S1 there is partial uncovering of the disc with superimposed shallow disc bulge which contributes to moderate spinal canal stenosis in association with facet hypertrophy and ligamentum flavum thickening. There is flattening of the neural foramina    with moderate bilateral neural foraminal stenosis.           Impression     Multilevel degenerative changes most pronounced at L5-S1 where prominent facet hypertrophy and ligamentum flavum thickening cause grade 2 anterolisthesis of L5 relative to S1 with partial uncovering the disc with superimposed disc bulge contributing to    moderate spinal canal stenosis and moderate bilateral neural foraminal stenosis.                  XR Left Hip (6/2/20)    PROCEDURE: XR HIP LEFT (2-3 VIEWS)       CLINICAL INFORMATION: Left hip pain.       COMPARISON: No prior study.       TECHNIQUE: AP and frog-leg lateral views of the left hip performed.           FINDINGS:    POST OPERATIVE: None.       ALIGNMENT: Anatomic.       MINERALIZATION: Normal.       FRACTURE: None.       HIP JOINT: Unremarkable.       LEFT SACROILIAC JOINT:    1. Mild sclerosis with associated erosive changes along the sacral and iliac margins of the visualized portions of the left sacroiliac joint consistent with a chronic sacroiliitis.       SYMPHYSIS:    1. Mild degenerative sclerosis at the symphysis.        Past Medical History:   Diagnosis Date    Angiomyolipoma     left kidney    Arthritis     hands    Blood transfusion reaction     Cancer (HCC)     left breast    Cancer of skin     Chronic kidney disease     Stage III    GERD (gastroesophageal reflux disease)     Hyperlipidemia     Hypertension     Osteopenia     Osteoporosis     Prolonged emergence from general anesthesia        Past Surgical History:   Procedure Laterality Date    APPENDECTOMY  1971    BACK INJECTION Left 9/14/2021    left sacroiliac joint injection performed by Kai Jones DO at [facility] LUMPECTOMY  2009    left    BREAST SURGERY  70's    left biopsy    BREAST SURGERY  2009    Lumpectomy of the Left breast   [address]  2006    COLONOSCOPY  2004    [facility]. INJECTION PROCEDURE FOR SACROILIAC JOINT Left 12/1/2020    left SI joint injection performed by Kai Jones DO at [facility]  2004    HIP SURGERY Left 5/25/2021    left intra-articular hip injection performed by Kai Jones DO at [facility] Left 2021    LEG SURGERY Right 07/07/2021    cancer removed from right leg    PAIN MANAGEMENT PROCEDURE N/A 3/30/2021    caudal epidural steroid injection [facility] Left 8/3/2021    LEFT L4 and L5 transforaminal epidural steroid injection performed by Kai Jones DO at [facility] Right 06/2013    Squemous cell cancer Right leg    SKIN BIOPSY Left 12/02/2013    Squemous cell cancer Left leg    SKIN CANCER EXCISION Left 12/02/2013    Exc SCC Left Lower Leg with flap,    SCC Right lower leg  7/7/21    TOTAL NEPHRECTOMY  2006    right       Family History   Problem Relation Age of Onset    Cancer Mother         melanoma, tongue    Hypertension Mother     High Cholesterol Mother     Heart Failure Father        Social History Socioeconomic History    Marital status:      Spouse name: Not on file    Number of children: Not on file    Years of education: Not on file    Highest education level: Not on file   Occupational History     Employer: [employer]   Tobacco Use    Smoking status: Never Smoker    Smokeless tobacco: Never Used   Vaping Use    Vaping Use: Never used   Substance and Sexual Activity    Alcohol use: No    Drug use: No    Sexual activity: Not Currently   Other Topics Concern    Not on file   Social History Narrative    Not on file     Social Determinants of Health     Financial Resource Strain:     Difficulty of Paying Living Expenses:    Food Insecurity:     Worried About Running Out of Food in the Last Year:     [address] in the Last Year:    Transportation Needs:     Lack of Transportation (Medical):      Lack of Transportation (Non-Medical):    Physical Activity:     Days of Exercise per Week:     Minutes of Exercise per Session:    Stress:     Feeling of Stress :    Social Connections:     Frequency of Communication with Friends and Family:     Frequency of Social Gatherings with Friends and Family:     Attends [employer] Services:     Active Member of Clubs or Organizations:     Attends Club or Organization Meetings:     Marital Status:    Intimate Partner Violence:     Fear of Current or Ex-Partner:     Emotionally Abused:     Physically Abused:     Sexually Abused:        Medications & Allergies:   Current Outpatient Medications   Medication Instructions    acetaminophen (TYLENOL) 500 mg, Oral, PRN    ALPRAZolam (XANAX) 0.25 mg, Oral, NIGHTLY PRN    aspirin 81 mg, DAILY    atorvastatin (LIPITOR) 10 MG tablet TAKE 1 TABLET BY MOUTH EVERY DAY    calcitonin (MIACALCIN) 200 UNIT/ACT nasal spray PLACE 1 SPRAY IN ONE NOSTRIL ONCE DAILY ALTERNATING NOSTRILS DAILY    Calcium + D (CALTRATE) 600-200 MG-UNIT tablet 2 tablets, DAILY    gabapentin (NEURONTIN) 300 MG capsule TAKE 1 CAPSULE BY MOUTH TWICE DAILY    [START ON 9/17/2021] HYDROcodone-acetaminophen (NORCO) 5-325 MG per tablet 1 tablet, Oral, 2 TIMES DAILY PRN    ketotifen (ZADITOR) 0.025 % ophthalmic solution 1 drop, Both Eyes, 2 TIMES DAILY    lansoprazole (PREVACID SOLUTAB) 15 mg, Oral, DAILY    losartan (COZAAR) 25 MG tablet TAKE 1 TABLET BY MOUTH EVERY DAY    Propylene Glycol (SYSTANE COMPLETE OP) 1 drop, Ophthalmic, 2 TIMES DAILY    therapeutic multivitamin-minerals (THERAGRAN-M) tablet 1 tablet, DAILY    traMADol (ULTRAM) 25 mg, Oral, EVERY 6 HOURS PRN       Allergies   Allergen Reactions    Cipro Xr Swelling and Rash    Dye [Iodides]      Patient only has one kidney       Review of Systems:   Constitutional: negative for weight changes or fevers  Genitourinary: negative for bowel/bladder incontinence   Musculoskeletal: Positive for left hip and anterior groin pain  Neurological: Negative for focal leg weakness or numbness/tingling  Behavioral/Psych: positive for anxiety/depression   All other systems reviewed and are negative    Objective:     Vitals:    09/16/21 0830   BP: 126/78     Constitutional: Pleasant, no acute distress   Head: Normocephalic, atraumatic   Eyes: Conjunctivae normal   Neck: Supple, symmetrical   Lungs: Normal respiratory effort, non-labored breathing   Cardiovascular: Limbs warm and well perfused   Abdomen: Non-protruded   Musculoskeletal: Muscle bulk symmetric, no atrophy, no gross deformities   · Lumbar Spine: Leg length discrepancy appreciated (left leg 1 inch longer than right). Negative seated SLR bilaterally. Lumbar paraspinals non-tender bilaterally. Left SI joint tender to palpation. Positive Mayra left. Positive Gaenslen's left. Neurological: Cranial nerves II-XII grossly intact. · Gait - Antalgic gait. Ambulates with assist device.    · Motor: No focal motor deficit appreciated lower limbs  · Sensory: LT sensation intact in lower limbs   Skin: No rashes or lesions visible in low back  Psychological: Cooperative, no exaggerated pain behaviors       Assessment:    Diagnosis Orders   1. Left hip pain  MRI HIP LEFT WO CONTRAST   2. Other chronic pain  HYDROcodone-acetaminophen (NORCO) 5-325 MG per tablet   3. Chronic left SI joint pain     4. Leg length discrepancy         Amrita Leon is a 78 y. o.female presenting to the pain clinic for evaluation of chronic left buttocks and anterior groin pain. Her clinical history and physical examination show a mixed pattern of pain that could be potentially coming from the lumbar spine from spinal stenosis versus SI joint dysfunction versus left intrinsic hip pathology. She failed to respond to a left SI joint injection and has continued anterior groin pain. I have set her up for a caudal epidural steroid injection to see if this is potentially coming from the spine particularly from the L4/L5 and L5/S1 level as any interlaminar or transforaminal injection at these levels would be very difficult to access the epidural space. She appears to have too significant pain generators at this point. She has left SI joint dysfunction proven by 2 SI joint injections. She also appears to have an intrinsic hip pathology contributing to her pain  I have ordered an MRI of the left hip for further evaluation. We will hold off on SI joint ablation until we obtain her MRI. We may make a referral down to Ortho neuro for evaluation after completion of the MRI. I have continued the patient's Fredrica Phalen as she has obtained benefit with this medication. Plan: The following treatment recommendations and plan were discussed in detail with Amrita Leon. Imaging:   I have reviewed patients imaging of MRI L-spine and left hip XR and results were discussed with patient today. Analgesics:   Patient is taking Acetaminophen. Patient informed that the maximum amount of acetaminophen taken on a regular basis should only be 4000 mg per day. Adjuvants:   None    Interventions:   None    Anticoagulation/NPO Recommendations:   None    Multidisciplinary Pain Management:   In the presence of complex, chronic, and multi-factorial pain, the importance of a multidisciplinary approach to pain management in the patients management regimen was emphasized and discussed in great detail.    PHYSICAL THERAPY: I encouraged the patient to continue her core strengthening exercise program    Referrals:  None    Prescriptions Written This Visit:   Norco 5 mg (#60, 0 refills)    Follow-up: Pending MRI hip results      Deion Davidson, DO  Interventional Pain Management/PM&R   [place]

## 2021-09-27 RX ORDER — LOSARTAN POTASSIUM 25 MG/1
TABLET ORAL
Qty: 90 TABLET | Refills: 1 | Status: SHIPPED | OUTPATIENT
Start: 2021-09-27 | End: 2022-03-24 | Stop reason: SDUPTHER

## 2021-09-28 ENCOUNTER — TELEPHONE (OUTPATIENT)
Dept: PHYSICAL MEDICINE AND REHAB | Age: 76
End: 2021-09-28

## 2021-09-28 LAB
ABSOLUTE BASO #: 0.1 X10E9/L (ref 0–0.2)
ABSOLUTE EOS #: 0.4 X10E9/L (ref 0–0.4)
ABSOLUTE LYMPH #: 1.5 X10E9/L (ref 1–3.5)
ABSOLUTE MONO #: 0.5 X10E9/L (ref 0–0.9)
ABSOLUTE NEUT #: 2.7 X10E9/L (ref 1.5–6.6)
ANION GAP SERPL CALCULATED.3IONS-SCNC: 9 MMOL/L (ref 5–15)
BASOPHILS RELATIVE PERCENT: 2.4 %
BUN BLDV-MCNC: 15 MG/DL (ref 5–27)
CALCIUM SERPL-MCNC: 10.1 MG/DL (ref 8.5–10.5)
CHLORIDE BLD-SCNC: 102 MMOL/L (ref 98–109)
CO2: 29 MMOL/L (ref 22–32)
CREAT SERPL-MCNC: 1.18 MG/DL (ref 0.4–1)
EGFR AFRICAN AMERICAN: 54 ML/MIN/1.73SQ.M
EGFR IF NONAFRICAN AMERICAN: 45 ML/MIN/1.73SQ.M
EOSINOPHILS RELATIVE PERCENT: 8.3 %
GLUCOSE: 94 MG/DL (ref 65–99)
HCT VFR BLD CALC: 37.8 % (ref 35–47)
HEMOGLOBIN: 12.6 G/DL (ref 11.7–15.5)
LYMPHOCYTE %: 28.7 %
MCH RBC QN AUTO: 28.8 PG (ref 27–34)
MCHC RBC AUTO-ENTMCNC: 33.3 G/DL (ref 32–36)
MCV RBC AUTO: 87 FL (ref 80–100)
MONOCYTES # BLD: 9.1 %
NEUTROPHILS RELATIVE PERCENT: 51.5 %
PDW BLD-RTO: 13.3 % (ref 11.5–15)
PLATELETS: 349 X10E9/L (ref 150–450)
PMV BLD AUTO: 7.7 FL (ref 7–12)
POTASSIUM SERPL-SCNC: 4.9 MMOL/L (ref 3.5–5)
RBC: 4.38 X10E12/L (ref 3.8–5.2)
SODIUM BLD-SCNC: 140 MMOL/L (ref 134–146)
WBC: 5.3 X10E9/L (ref 4–11)

## 2021-09-29 NOTE — TELEPHONE ENCOUNTER
I called and left Parisa a message regarding patients MRI. I spoke with Dr. John Sanford earlier and he states he would like the MRI to be a regular MRI.

## 2021-09-30 ENCOUNTER — HOSPITAL ENCOUNTER (OUTPATIENT)
Dept: MRI IMAGING | Age: 76
Discharge: HOME | End: 2021-09-30
Payer: MEDICARE

## 2021-09-30 DIAGNOSIS — M25.552 LEFT HIP PAIN: ICD-10-CM

## 2021-09-30 PROCEDURE — 73721 MRI JNT OF LWR EXTRE W/O DYE: CPT

## 2021-10-04 ENCOUNTER — OFFICE VISIT (OUTPATIENT)
Dept: NEPHROLOGY | Age: 76
End: 2021-10-04
Payer: MEDICARE

## 2021-10-04 VITALS
HEIGHT: 57 IN | WEIGHT: 121 LBS | OXYGEN SATURATION: 97 % | RESPIRATION RATE: 18 BRPM | DIASTOLIC BLOOD PRESSURE: 84 MMHG | HEART RATE: 108 BPM | BODY MASS INDEX: 26.1 KG/M2 | TEMPERATURE: 97.4 F | SYSTOLIC BLOOD PRESSURE: 137 MMHG

## 2021-10-04 DIAGNOSIS — N18.32 STAGE 3B CHRONIC KIDNEY DISEASE (HCC): Primary | ICD-10-CM

## 2021-10-04 PROCEDURE — 1123F ACP DISCUSS/DSCN MKR DOCD: CPT | Performed by: INTERNAL MEDICINE

## 2021-10-04 PROCEDURE — 1036F TOBACCO NON-USER: CPT | Performed by: INTERNAL MEDICINE

## 2021-10-04 PROCEDURE — G8484 FLU IMMUNIZE NO ADMIN: HCPCS | Performed by: INTERNAL MEDICINE

## 2021-10-04 PROCEDURE — G8427 DOCREV CUR MEDS BY ELIG CLIN: HCPCS | Performed by: INTERNAL MEDICINE

## 2021-10-04 PROCEDURE — 1090F PRES/ABSN URINE INCON ASSESS: CPT | Performed by: INTERNAL MEDICINE

## 2021-10-04 PROCEDURE — 99214 OFFICE O/P EST MOD 30 MIN: CPT | Performed by: INTERNAL MEDICINE

## 2021-10-04 PROCEDURE — G8399 PT W/DXA RESULTS DOCUMENT: HCPCS | Performed by: INTERNAL MEDICINE

## 2021-10-04 PROCEDURE — 3017F COLORECTAL CA SCREEN DOC REV: CPT | Performed by: INTERNAL MEDICINE

## 2021-10-04 PROCEDURE — G8417 CALC BMI ABV UP PARAM F/U: HCPCS | Performed by: INTERNAL MEDICINE

## 2021-10-04 PROCEDURE — 4040F PNEUMOC VAC/ADMIN/RCVD: CPT | Performed by: INTERNAL MEDICINE

## 2021-10-04 NOTE — PATIENT INSTRUCTIONS
KNOW YOUR KIDNEY NUMBERS    Your kidney speed (eGFR) was 45 ml/min this visit (normal is  ml/min)(Ml/min=milliliters of blood filtered per minute). The higher this number is, the better your kidney function is. Your serum creatinine was 1.18 (normal 0.8-1.2 mg/dl at most labs). The higher this number is, the worse your kidney function is. You are in stage G-IIIB-A1 of chronic kidney disease. Your kidney function has declined as compared to your last visit. Your last eGFR was  48 Ml/Min. Stages of kidney disease    EGFR (estimated glomerular filtration rate)    G-I > 90 ml/min Kidney damage with normal kidney function (blood or protein in the urine)  G-II 60-89 ml/min Normal kidney function with mild damage with or without blood or protein in the urine  G-IIIA 45-59 ml/min Mild to moderate loss of kidney function. G-IIIB 30-44 ml/min Moderate to severe loss of kidney function  G-IV 15-29 ml/min Severe loss of kidney function  G-V < 15 mlmin     May need dialysis or kidney transplant    ACR (urine albumin/creatinine ratio) (Mg/Gm)    A-1      ACR<30 Normal to mildly increased protein in the urine. A-2 ACR  Moderate increase in urine protein loss. A-3 ACR >300 Severe increase in urine protein loss    Our goal is to keep your eGFR going as fast as possible ( ml/min is normal). If your eGFR declines to 15-24 ml/min and stays there without recovery,  we will begin to educate you about dialysis or kidney transplant. We also want to keep the protein in your urine as low as possible. The leading cause of kidney disease in the world is diabetes mellitus. Keep your sugar  as much as possible. The second leading cause is hypertension. Keep Your blood pressure 120-140/70-80 as much as possible. If you need refills, call the office or your drug store. You may call the office any time with any questions or concerns.     We use Epic software to manage your private patient data securely. Any health care provider or hospital in the world that uses Epic software can see your data if they have the appropriate credentials. DUE TO THE CORONAVIRUS CONCERN, PLEASE LIMIT YOUR TIME IN PUBLIC. [address] YOUR HANDS COMPLETELY AND FREQUENTLY. Juan Lacey

## 2021-10-04 NOTE — PROGRESS NOTES
[facility]    [address]  [address], [address]  [phone]       Progress Note    10/4/2021 10:53 AM    Pt Name:    Zahra Mendoza  YOB: 1945  Primary Care Physician:  Shaina Montaño MD       Chief Complaint:   Chief Complaint   Patient presents with    Chronic Kidney Disease    Hypertension    Other     S/P right nephrectomy for lipoma        History of Chief Complaint:  CKD stage G-IIIB-A1 from right nephrectomy done for angiolipoma.        Subjective:  I last saw the patient in clinic 03/10/2021. I follow the patient for Chronic Kidney disease stage G-IIIB-A1. Since our last visit the patient has not been hospitalized. The patient is sleeping well at night with 1-2 times per night nocturia. The patient has a good appetite and is remaining active. The patient denied N/V/C/D/SOB/CP. She has no trouble at bladder emptying. She feel earlier in the year and now has hip and back pain. She was using tylenol and tramadol. She has received steroid injections with very little IV contrast media used. COVID-19 screening  Fever: none  Cough: none  Exposure: none  Shortness of breath: none    Micro albumin/creatinine ratio:   eGFR: 45 Ml/min (it was 48 Ml/Min last visit)  SCr: 1.18 Mg/Dl (It was 1.16 Mg/Dl last visit)      Last six eGFR readings:  Lab Results   Component Value Date    LABGLOM 44 04/17/2019    LABGLOM 49 04/18/2018    LABGLOM 37 08/11/2014    LABGLOM 41 05/09/2014    LABGLOM 35 05/08/2014          Objective:  VITALS:  /84 (Site: Right Upper Arm, Position: Sitting, Cuff Size: Small Adult)   Pulse 108   Temp 97.4 °F (36.3 °C)   Resp 18   Ht 4' 9\" (1.448 m)   Wt 121 lb (54.9 kg)   SpO2 97%   BMI 26.18 kg/m²   Weight:   Wt Readings from Last 3 Encounters:   10/04/21 121 lb (54.9 kg)   09/16/21 119 lb (54 kg)   09/14/21 119 lb (54 kg)     Body mass index is 26.18 kg/m².      Physical examination    General:  Alert and cooperative with exam  HEENT:  Normocephalic. No lesions nor rashes. VINCENT. EOMI  Neck:   No JVD and no bruits. Thyroid gland is normal  Lungs:  Breathing easily. No rales nor rhonchi. No cough nor sputum production. Heart[de-identified]            RRR. No murmurs nor rubs. PMI is not enlarged nor displaced. Abdomen:  Soft and non tender. Bowel sounds are active in all four quadrants. Extremities:  no edema  Neurologic:  Neurology  Skin:                Warm and dry with no rashes. Muscles:         Hand  and leg strength are equal and strong bilaterally.      Lab Data      CBC:   Lab Results   Component Value Date    WBC 5.3 09/28/2021    HGB 12.6 09/28/2021    HCT 37.8 09/28/2021    MCV 87 09/28/2021     09/28/2021     BMP:    Lab Results   Component Value Date     09/28/2021     03/04/2021     11/03/2020    K 4.9 09/28/2021    K 5.7 (H) 03/04/2021    K 5.2 (H) 11/03/2020     09/28/2021     03/04/2021     11/03/2020    CO2 29 09/28/2021    CO2 27 03/04/2021    CO2 26 11/03/2020    BUN 15 09/28/2021    BUN 12 03/04/2021    BUN 19 11/03/2020    CREATININE 1.18 (H) 09/28/2021    CREATININE 1.12 (H) 03/04/2021    CREATININE 1.33 (H) 11/03/2020    GLUCOSE 94 09/28/2021    GLUCOSE 91 03/04/2021    GLUCOSE 80 11/03/2020      Hepatic:   Lab Results   Component Value Date    AST 24 04/06/2021    AST 27 05/13/2020    AST 22 04/17/2019    ALT 20 04/06/2021    ALT 31 05/13/2020    ALT 16 04/17/2019    BILITOT 0.5 04/06/2021    BILITOT Negative 08/13/2020    BILITOT 0.5 05/13/2020    ALKPHOS 82 04/06/2021    ALKPHOS 71 05/13/2020    ALKPHOS 83 04/17/2019     BNP: No results found for: BNP  Lipids:   Lab Results   Component Value Date    CHOL 153 04/06/2021    HDL 65 04/06/2021     INR: No results found for: INR  URINE:   Lab Results   Component Value Date    PROTUR Trace 08/13/2020     Lab Results   Component Value Date    NITRU Negative 08/13/2020    COLORU YELLOW 08/13/2020    PHUR 6.5 09/03/2015    RBCUA 0 04/16/2014    YEAST 0 04/16/2014    BACTERIA 0 04/16/2014    CLARITYU Clear 04/16/2014    LEUKOCYTESUR Negative 08/13/2020    UROBILINOGEN <1.1 08/13/2020    BILIRUBINUR NEGATIVE 05/08/2014    BLOODU Negative 09/03/2015    GLUCOSEU neg 09/03/2015    KETUA Negative 08/13/2020      Microalbumen/Creatinine ratio:  No components found for: RUCREAT        Medications:    Current Outpatient Medications   Medication Sig Dispense Refill    losartan (COZAAR) 25 MG tablet TAKE 1 TABLET BY MOUTH EVERY DAY 90 tablet 1    HYDROcodone-acetaminophen (NORCO) 5-325 MG per tablet Take 1 tablet by mouth 2 times daily as needed for Pain for up to 30 days. 60 tablet 0    ketotifen (ZADITOR) 0.025 % ophthalmic solution Place 1 drop into both eyes 2 times daily      Propylene Glycol (SYSTANE COMPLETE OP) Apply 1 drop to eye 2 times daily      ALPRAZolam (XANAX) 0.25 MG tablet Take 0.25 mg by mouth nightly as needed for Sleep.  gabapentin (NEURONTIN) 300 MG capsule TAKE 1 CAPSULE BY MOUTH TWICE DAILY 180 capsule 1    calcitonin (MIACALCIN) 200 UNIT/ACT nasal spray PLACE 1 SPRAY IN ONE NOSTRIL ONCE DAILY ALTERNATING NOSTRILS DAILY 11.1 mL 3    atorvastatin (LIPITOR) 10 MG tablet TAKE 1 TABLET BY MOUTH EVERY DAY 90 tablet 3    traMADol (ULTRAM) 50 MG tablet Take 25 mg by mouth every 6 hours as needed for Pain.  lansoprazole (PREVACID SOLUTAB) 15 MG disintegrating tablet Take 15 mg by mouth daily      acetaminophen (TYLENOL) 500 MG tablet Take 500 mg by mouth as needed       Calcium + D (CALTRATE) 600-200 MG-UNIT tablet Take 2 tablets by mouth daily.  aspirin 81 MG EC tablet Take 81 mg by mouth daily.  therapeutic multivitamin-minerals (THERAGRAN-M) tablet Take 1 tablet by mouth daily. No current facility-administered medications for this visit. IMPRESSIONS:        Kidney disease: CKD stage G-IIIB-A1  Anemia: Anemia remains stable. No need for an erythrocyte stimulating agent (LUPIS).   Bone and mineral metabolism: There is no complaint of bone pain. PLAN:  1. We discussed the eGFR today. 2. We will continue all current medications without changes. 3. Checking urine microalbumin/creatinine ratio. 4. We will see the patient back in 4 months.       _________________________________  Kathleen Evans.  Michelle Vanegas DO  [facility]      Jessica Toledo MD

## 2021-10-07 ENCOUNTER — OFFICE VISIT (OUTPATIENT)
Dept: PHYSICAL MEDICINE AND REHAB | Age: 76
End: 2021-10-07
Payer: MEDICARE

## 2021-10-07 VITALS
SYSTOLIC BLOOD PRESSURE: 140 MMHG | DIASTOLIC BLOOD PRESSURE: 70 MMHG | WEIGHT: 121 LBS | HEIGHT: 57 IN | BODY MASS INDEX: 26.1 KG/M2

## 2021-10-07 DIAGNOSIS — G89.29 OTHER CHRONIC PAIN: ICD-10-CM

## 2021-10-07 DIAGNOSIS — G89.29 CHRONIC LEFT SI JOINT PAIN: ICD-10-CM

## 2021-10-07 DIAGNOSIS — M53.3 CHRONIC LEFT SI JOINT PAIN: ICD-10-CM

## 2021-10-07 DIAGNOSIS — M25.552 LEFT HIP PAIN: Primary | ICD-10-CM

## 2021-10-07 DIAGNOSIS — M21.70 LEG LENGTH DISCREPANCY: ICD-10-CM

## 2021-10-07 PROCEDURE — G8427 DOCREV CUR MEDS BY ELIG CLIN: HCPCS | Performed by: ANESTHESIOLOGY

## 2021-10-07 PROCEDURE — 3017F COLORECTAL CA SCREEN DOC REV: CPT | Performed by: ANESTHESIOLOGY

## 2021-10-07 PROCEDURE — 1123F ACP DISCUSS/DSCN MKR DOCD: CPT | Performed by: ANESTHESIOLOGY

## 2021-10-07 PROCEDURE — G8484 FLU IMMUNIZE NO ADMIN: HCPCS | Performed by: ANESTHESIOLOGY

## 2021-10-07 PROCEDURE — 99214 OFFICE O/P EST MOD 30 MIN: CPT | Performed by: ANESTHESIOLOGY

## 2021-10-07 PROCEDURE — G8399 PT W/DXA RESULTS DOCUMENT: HCPCS | Performed by: ANESTHESIOLOGY

## 2021-10-07 PROCEDURE — 4040F PNEUMOC VAC/ADMIN/RCVD: CPT | Performed by: ANESTHESIOLOGY

## 2021-10-07 PROCEDURE — G8417 CALC BMI ABV UP PARAM F/U: HCPCS | Performed by: ANESTHESIOLOGY

## 2021-10-07 PROCEDURE — 1090F PRES/ABSN URINE INCON ASSESS: CPT | Performed by: ANESTHESIOLOGY

## 2021-10-07 PROCEDURE — 1036F TOBACCO NON-USER: CPT | Performed by: ANESTHESIOLOGY

## 2021-10-07 NOTE — PROGRESS NOTES
Chronic Pain/PM&R Clinic Note     Encounter Date: 10/7/21    Subjective:   Chief Complaint:   Chief Complaint   Patient presents with    Follow-up     discuss MRI        History of Present Illness:   Timothy Pierre is a 78 y.o. female seen in the clinic initially on 11/16/20 upon request from Layo Nava DO (orthopedic surgery) for her history of chronic left groin and posterior thigh pain. She states that her pain started after a fall on ice back in February 2020. She describes her pain as a constant ache in the left buttocks with radiation down the posterior thigh and in to the right groin down the front of the thigh. She states that the pain is typically 4/10 on a day-to-day basis. She states she saw a chiropractor when initially happened which did give her some relief but then her pain returned at the end of April. She denies any focal leg weakness, paresthesias, saddle anesthesia, bowel/bladder incontinence. Her pain comes and goes and she is not sure what exacerbates her pain. She does get relief with the occasional use of Tylenol and tramadol. She states that she did actually suffer a meniscus tear about 2 weeks ago and is slated to have an MRI next week for evaluation. She states she has completed physical therapy for her low back and continues with home core strengthening exercises. Today, 10/7/2021, patient presents for planned follow-up for chronic left hip pain. Overall, she reports doing much worse since her last visit. She states that her pain in her anterior groin region has become much more consistent and more painful over the last couple weeks. She states that she has been really limping around the last couple days and requiring extra use of her assistive cane to ambulate.   She states the medications are not even touching her pain at this point including her Norco.  She is wondering about the results of her MRI of her left hip and wondering what else can be done to help her.    History of Intervention:   Surgery: No previous low back surgeries  Injections: Left SI joint injection (12/1/20) - significant relief (>90%) x 2 days  Caudal YAA (3/30/2021)greater than 50% pain relief x 6 weeks  Left intra-articular hip inj (5/25/21) - minimal relief  Left L4 and L5 TFESI (8/3/21) - minimal relief  Left SI joint block (9/14/21) - >90% relief in posterior hip pain    Current Treatment Medications:   Norco 5 mg BID PRN -prescribed by me  Tylenol PRN  Gabapentin 300 mg BID    Historical Treatment Medications:   Tramadol - stopped by me    Imaging:  MRI L-hip (9/30/21)    PROCEDURE: MRI HIP LEFT WO CONTRAST       CLINICAL INFORMATION: Left hip pain. Several attempts were done to get ahold of office and have them order a hip arthrogram for labral tear. Office did not change order       COMPARISON: No prior study.       TECHNIQUE: Routine MRI hip without contrast.           FINDINGS:    ALIGNMENT: Anatomic.       MARROW SIGNAL/MARROW EDEMA/FRACTURE:    1. Mildly increased marrow signal within the acetabulum.       LEFT HIP:   1. Diffuse cartilage loss with bone-on-bone articulation of the upper compartment. 2. There is no avascular necrosis. 3. Nondedicated views of the labrum. There is high signal at the anterior labrum with prior labral cystic changes. Additional contour irregularity of the articular surface of the anterior labrum (8/10)   4. There is no evidence of femoroacetabular impingement. 5. Moderate effusion with fluid extending into the iliacus musculature. Mild muscular edema of the hip flexors. Fluid signal about the psoas bursa (8/11)       RIGHT HIP:   Limited nondedicated views are grossly unremarkable.       TENDONS: Unremarkable.       MUSCULATURE: Unremarkable.       SACRUM/SACROILIAC JOINTS: Unremarkable.       SCIATIC NOTCH/SCIATIC NERVE: Unremarkable.       OTHER: Hysterectomy. Diverticulosis. .               Impression   1.   Nondistended views of the labrum, suspected labral tear given paralabral cystic changes and high signal within the visualized anterior aspect of the labrum. There is also a contour deformity seen on axial views, also suggestive of underlying labral    tear. Distended views may be helpful. 2.  Osteoarthritis with reactive changes in the marrow and femoral head remodeling   3.  Moderate to severe hip joint effusion with edema of the adjacent musculature. 4.  Additional distention of the psoas bursa may correlate with iliopsoas bursitis. 5.  Hysterectomy    6.  Colonic diverticulosis. MRI Lspine (9/10/20)    PROCEDURE: MRI LUMBAR SPINE WO CONTRAST         CLINICAL INFORMATION: Lumbar radiculopathy. Low back pain radiating into left hip and groin for 7 months after fall.         COMPARISON: Lumbar spine radiographs dated 6/22/2020.         TECHNIQUE: Sagittal and axial T1 and T2-weighted images were obtained through the lumbar spine.         FINDINGS:    There is a dextrocurvature of the lumbar spine, similar to prior radiographs. There is grade 1 anterolisthesis of L4 relative to L5 and grade 2 anterolisthesis of L5 relative to S1 on the basis of degenerative change, also stable compared to prior    radiographs. There is otherwise anatomic vertebral body height and alignment. There is disc space narrowing and mild osteophytosis at T11-12 with hyperintense T1 and T2 signal at the opposing endplates as evidence for Modic 2 degenerative change. Marrow    signal is otherwise within normal limits. The conus terminates in a normal fashion at the L1-2 level. Paraspinal soft tissues are unremarkable. At T12-L1 there is no significant spinal canal or neuroforaminal stenosis. At L1-2 there is no significant spinal canal or neuroforaminal stenosis. At L2-3 there is a shallow disc bulge without significant spinal canal or neuroforaminal stenosis.     At L3-4 there is a shallow disc bulge without significant spinal canal or neuroforaminal stenosis. At L4-5 there is partial uncovering of the disc with superimposed shallow disc bulge which contributes to mild spinal canal stenosis in association with facet hypertrophy and ligament flavum thickening. There is mild to moderate bilateral neural    foraminal stenosis. At L5-S1 there is partial uncovering of the disc with superimposed shallow disc bulge which contributes to moderate spinal canal stenosis in association with facet hypertrophy and ligamentum flavum thickening. There is flattening of the neural foramina    with moderate bilateral neural foraminal stenosis.           Impression     Multilevel degenerative changes most pronounced at L5-S1 where prominent facet hypertrophy and ligamentum flavum thickening cause grade 2 anterolisthesis of L5 relative to S1 with partial uncovering the disc with superimposed disc bulge contributing to    moderate spinal canal stenosis and moderate bilateral neural foraminal stenosis.                  XR Left Hip (6/2/20)    PROCEDURE: XR HIP LEFT (2-3 VIEWS)       CLINICAL INFORMATION: Left hip pain.       COMPARISON: No prior study.       TECHNIQUE: AP and frog-leg lateral views of the left hip performed.           FINDINGS:    POST OPERATIVE: None.       ALIGNMENT: Anatomic.       MINERALIZATION: Normal.       FRACTURE: None.       HIP JOINT: Unremarkable.       LEFT SACROILIAC JOINT:    1. Mild sclerosis with associated erosive changes along the sacral and iliac margins of the visualized portions of the left sacroiliac joint consistent with a chronic sacroiliitis.       SYMPHYSIS:    1. Mild degenerative sclerosis at the symphysis.        Past Medical History:   Diagnosis Date    Angiomyolipoma     left kidney    Arthritis     hands    Blood transfusion reaction     Cancer (HCC)     left breast    Cancer of skin     Chronic kidney disease     Stage III    GERD (gastroesophageal reflux disease)     Hyperlipidemia     Hypertension     Osteopenia     Osteoporosis     Prolonged emergence from general anesthesia        Past Surgical History:   Procedure Laterality Date    APPENDECTOMY  1971    BACK INJECTION Left 9/14/2021    left sacroiliac joint injection performed by Marie Ann DO at [facility] LUMPECTOMY  2009    left    BREAST SURGERY  70's    left biopsy    BREAST SURGERY  2009    Lumpectomy of the Left breast   [address]  2006    COLONOSCOPY  2004    [facility]. INJECTION PROCEDURE FOR SACROILIAC JOINT Left 12/1/2020    left SI joint injection performed by Marie Ann DO at [facility]  2004    HIP SURGERY Left 5/25/2021    left intra-articular hip injection performed by Marie Ann DO at [facility] 2021    LEG SURGERY Right 07/07/2021    cancer removed from right leg    PAIN MANAGEMENT PROCEDURE N/A 3/30/2021    caudal epidural steroid injection [place] [id number] Left 8/3/2021    LEFT L4 and L5 transforaminal epidural steroid injection performed by Marie Ann DO at [facility] Right 06/2013    Squemous cell cancer Right leg    SKIN BIOPSY Left 12/02/2013    Squemous cell cancer Left leg    SKIN CANCER EXCISION Left 12/02/2013    Exc SCC Left Lower Leg with flap,    SCC Right lower leg  7/7/21    TOTAL NEPHRECTOMY  2006    right       Family History   Problem Relation Age of Onset    Cancer Mother         melanoma, tongue    Hypertension Mother     High Cholesterol Mother     Heart Failure Father        Social History     Socioeconomic History    Marital status:      Spouse name: Not on file    Number of children: Not on file    Years of education: Not on file    Highest education level: Not on file   Occupational History     Employer: [employer]   Tobacco Use    Smoking status: Never Smoker    Smokeless tobacco: Never Used   Vaping Use    Vaping Use: Never used   Substance and Sexual Activity    Alcohol use: No    Drug use: No    Sexual activity: Not Currently   Other Topics Concern    Not on file   Social History Narrative    Not on file     Social Determinants of Health     Financial Resource Strain:     Difficulty of Paying Living Expenses:    Food Insecurity:     Worried About Running Out of Food in the Last Year:     Ran Out of Food in the Last Year:    Transportation Needs:     Lack of Transportation (Medical):      Lack of Transportation (Non-Medical):    Physical Activity:     Days of Exercise per Week:     Minutes of Exercise per Session:    Stress:     Feeling of Stress :    Social Connections:     Frequency of Communication with Friends and Family:     Frequency of Social Gatherings with Friends and Family:     Attends Zoroastrian Services:     Active Member of Clubs or Organizations:     Attends Club or Organization Meetings:     Marital Status:    Intimate Partner Violence:     Fear of Current or Ex-Partner:     Emotionally Abused:     Physically Abused:     Sexually Abused:        Medications & Allergies:   Current Outpatient Medications   Medication Instructions    acetaminophen (TYLENOL) 500 mg, Oral, PRN    ALPRAZolam (XANAX) 0.25 mg, Oral, NIGHTLY PRN    aspirin 81 mg, DAILY    atorvastatin (LIPITOR) 10 MG tablet TAKE 1 TABLET BY MOUTH EVERY DAY    calcitonin (MIACALCIN) 200 UNIT/ACT nasal spray PLACE 1 SPRAY IN ONE NOSTRIL ONCE DAILY ALTERNATING NOSTRILS DAILY    Calcium + D (CALTRATE) 600-200 MG-UNIT tablet 2 tablets, DAILY    gabapentin (NEURONTIN) 300 MG capsule TAKE 1 CAPSULE BY MOUTH TWICE DAILY    [START ON 10/17/2021] HYDROcodone-acetaminophen (NORCO) 5-325 MG per tablet 1 tablet, Oral, 2 TIMES DAILY PRN    ketotifen (ZADITOR) 0.025 % ophthalmic solution 1 drop, Both Eyes, 2 TIMES DAILY    lansoprazole (PREVACID SOLUTAB) 15 mg, Oral, DAILY    losartan (COZAAR) 25 MG tablet TAKE 1 TABLET BY MOUTH EVERY DAY    Propylene Glycol (SYSTANE COMPLETE OP) 1 drop, Ophthalmic, 2 TIMES DAILY    therapeutic multivitamin-minerals (THERAGRAN-M) tablet 1 tablet, DAILY    traMADol (ULTRAM) 25 mg, Oral, EVERY 6 HOURS PRN       Allergies   Allergen Reactions    Cipro Xr Swelling and Rash    Dye [Iodides]      Patient only has one kidney       Review of Systems:   Constitutional: negative for weight changes or fevers  Genitourinary: negative for bowel/bladder incontinence   Musculoskeletal: Positive for left hip and anterior groin pain  Neurological: Negative for focal leg weakness or numbness/tingling  Behavioral/Psych: positive for anxiety/depression   All other systems reviewed and are negative    Objective:     Vitals:    10/07/21 0917   BP: (!) 140/70     Constitutional: Pleasant, no acute distress   Head: Normocephalic, atraumatic   Eyes: Conjunctivae normal   Neck: Supple, symmetrical   Lungs: Normal respiratory effort, non-labored breathing   Cardiovascular: Limbs warm and well perfused   Abdomen: Non-protruded   Musculoskeletal: Muscle bulk symmetric, no atrophy, no gross deformities   · Lumbar Spine: Leg length discrepancy appreciated (left leg 1 inch longer than right). Negative seated SLR bilaterally. Lumbar paraspinals non-tender bilaterally. Left SI joint tender to palpation. Positive Mayra left. Positive Gaenslen's left. Neurological: Cranial nerves II-XII grossly intact. · Gait - Severely antalgic gait. Ambulates with assist device. · Motor: No focal motor deficits appreciated in lower limbs  · Sensory: LT sensation intact in lower limbs   Skin: No rashes or lesions visible in low back  Psychological: Cooperative, no exaggerated pain behaviors       Assessment:    Diagnosis Orders   1. Left hip pain  External Referral To Orthopedic Surgery   2.  Other chronic pain HYDROcodone-acetaminophen (NORCO) 5-325 MG per tablet   3. Chronic left SI joint pain     4. Leg length discrepancy         Jason Mcclain is a 78 y. o.female presenting to the pain clinic for evaluation of chronic left buttocks and anterior groin pain. Her clinical history and physical examination show a mixed pattern of pain that could be potentially coming from the lumbar spine from spinal stenosis versus SI joint dysfunction versus left intrinsic hip pathology. She failed to respond to a left SI joint injection and has continued anterior groin pain. I have set her up for a caudal epidural steroid injection to see if this is potentially coming from the spine particularly from the L4/L5 and L5/S1 level as any interlaminar or transforaminal injection at these levels would be very difficult to access the epidural space. She appears to have too significant pain generators at this point. She has left SI joint dysfunction proven by 2 SI joint injections. She also appears to have an intrinsic hip pathology contributing to her pain  I have ordered an MRI of the left hip for further evaluation. We will hold off on SI joint ablation until we obtain her MRI. Her MRI of the left hip does show a large labral tear and I have referred her to orthopedic surgery (Dr. Gabriel Jurado) for surgical evaluation. I have refilled her Norco for breakthrough pain and will follow up after surgical consultation. Plan: The following treatment recommendations and plan were discussed in detail with Jason Mcclain. Imaging:   I have reviewed patients imaging of MRI L-hip and results were discussed with patient today. Analgesics: For continued severe pain from her left hip, I can see the patient on Norco 5 mg that she can take up to twice a day for severe pain (pain greater than 7/10).   Patient is advised take this medication as prescribed is take more than prescribed can lead to development of tolerance, physical dependence, addiction. OARRS reviewed and is appropriate  Pain contract signed    Patient is taking Acetaminophen. Patient informed that the maximum amount of acetaminophen taken on a regular basis should only be 4000 mg per day. Adjuvants:   None    Interventions:   None    Anticoagulation/NPO Recommendations:   None    Multidisciplinary Pain Management:   In the presence of complex, chronic, and multi-factorial pain, the importance of a multidisciplinary approach to pain management in the patients management regimen was emphasized and discussed in great detail. PHYSICAL THERAPY: I encouraged the patient to continue her core strengthening exercise program    Referrals:  Gianluca Palacio     Prescriptions Written This Visit:   Bilyl Samantha 5 mg (#60, 0 refills)    Follow-up: 12 weeks (pending hip surgery)    I spent 30 minutes with the patient and greater than 50% of this time was spent coordinating care (referring patient to orthopedic surgery), reviewing MRI of the left hip with the patient, and discussing breakthrough pain medications patient could potentially try.       Kiel Lisa, DO  Interventional Pain Management/PM&R   [place]

## 2021-10-08 RX ORDER — HYDROCODONE BITARTRATE AND ACETAMINOPHEN 5; 325 MG/1; MG/1
1 TABLET ORAL 2 TIMES DAILY PRN
Qty: 60 TABLET | Refills: 0 | Status: SHIPPED | OUTPATIENT
Start: 2021-10-17 | End: 2021-11-16

## 2021-10-11 ENCOUNTER — TELEPHONE (OUTPATIENT)
Dept: PHYSICAL MEDICINE AND REHAB | Age: 76
End: 2021-10-11

## 2021-10-19 DIAGNOSIS — G89.29 OTHER CHRONIC PAIN: ICD-10-CM

## 2021-10-19 DIAGNOSIS — M25.552 LEFT HIP PAIN: Primary | ICD-10-CM

## 2021-10-19 RX ORDER — OXYCODONE HYDROCHLORIDE AND ACETAMINOPHEN 5; 325 MG/1; MG/1
1 TABLET ORAL EVERY 8 HOURS PRN
Qty: 90 TABLET | Refills: 0 | Status: SHIPPED | OUTPATIENT
Start: 2021-10-19 | End: 2021-10-20 | Stop reason: SDUPTHER

## 2021-10-20 DIAGNOSIS — G89.29 OTHER CHRONIC PAIN: ICD-10-CM

## 2021-10-20 DIAGNOSIS — M25.552 LEFT HIP PAIN: ICD-10-CM

## 2021-10-20 RX ORDER — OXYCODONE HYDROCHLORIDE AND ACETAMINOPHEN 5; 325 MG/1; MG/1
1 TABLET ORAL EVERY 8 HOURS PRN
Qty: 90 TABLET | Refills: 0 | Status: SHIPPED | OUTPATIENT
Start: 2021-10-20 | End: 2021-11-19

## 2021-10-20 NOTE — TELEPHONE ENCOUNTER
The percocet prescription was sent to the wrong pharmacy. Patient requested [facility] because it is much cheaper to purchase. I will cancel the rx at [facility].

## 2021-11-03 ENCOUNTER — HOSPITAL ENCOUNTER (OUTPATIENT)
Dept: GENERAL RADIOLOGY | Age: 76
Discharge: HOME | End: 2021-11-03
Payer: MEDICARE

## 2021-11-03 ENCOUNTER — HOSPITAL ENCOUNTER (OUTPATIENT)
Age: 76
Discharge: HOME | End: 2021-11-03
Payer: MEDICARE

## 2021-11-03 ENCOUNTER — HOSPITAL ENCOUNTER (OUTPATIENT)
Age: 76
End: 2021-11-03
Payer: MEDICARE

## 2021-11-03 DIAGNOSIS — Z96.642 S/P TOTAL LEFT HIP ARTHROPLASTY: ICD-10-CM

## 2021-11-03 LAB
EKG ATRIAL RATE: 82 BPM
EKG P AXIS: 49 DEGREES
EKG P-R INTERVAL: 164 MS
EKG Q-T INTERVAL: 364 MS
EKG QRS DURATION: 66 MS
EKG QTC CALCULATION (BAZETT): 425 MS
EKG R AXIS: -2 DEGREES
EKG T AXIS: 7 DEGREES
EKG VENTRICULAR RATE: 82 BPM

## 2021-11-03 PROCEDURE — 71046 X-RAY EXAM CHEST 2 VIEWS: CPT

## 2021-11-03 PROCEDURE — 93005 ELECTROCARDIOGRAM TRACING: CPT | Performed by: FAMILY MEDICINE

## 2021-11-10 ENCOUNTER — OFFICE VISIT (OUTPATIENT)
Dept: FAMILY MEDICINE CLINIC | Age: 76
End: 2021-11-10
Payer: MEDICARE

## 2021-11-10 VITALS
HEART RATE: 60 BPM | WEIGHT: 118 LBS | BODY MASS INDEX: 25.53 KG/M2 | OXYGEN SATURATION: 97 % | DIASTOLIC BLOOD PRESSURE: 80 MMHG | TEMPERATURE: 97.7 F | RESPIRATION RATE: 16 BRPM | SYSTOLIC BLOOD PRESSURE: 122 MMHG

## 2021-11-10 DIAGNOSIS — K21.9 GASTROESOPHAGEAL REFLUX DISEASE, UNSPECIFIED WHETHER ESOPHAGITIS PRESENT: ICD-10-CM

## 2021-11-10 DIAGNOSIS — M16.12 PRIMARY OSTEOARTHRITIS OF LEFT HIP: Primary | ICD-10-CM

## 2021-11-10 DIAGNOSIS — I10 PRIMARY HYPERTENSION: ICD-10-CM

## 2021-11-10 DIAGNOSIS — E78.5 HYPERLIPIDEMIA, UNSPECIFIED HYPERLIPIDEMIA TYPE: Chronic | ICD-10-CM

## 2021-11-10 PROCEDURE — 1123F ACP DISCUSS/DSCN MKR DOCD: CPT | Performed by: FAMILY MEDICINE

## 2021-11-10 PROCEDURE — 4040F PNEUMOC VAC/ADMIN/RCVD: CPT | Performed by: FAMILY MEDICINE

## 2021-11-10 PROCEDURE — 3017F COLORECTAL CA SCREEN DOC REV: CPT | Performed by: FAMILY MEDICINE

## 2021-11-10 PROCEDURE — G8427 DOCREV CUR MEDS BY ELIG CLIN: HCPCS | Performed by: FAMILY MEDICINE

## 2021-11-10 PROCEDURE — G8484 FLU IMMUNIZE NO ADMIN: HCPCS | Performed by: FAMILY MEDICINE

## 2021-11-10 PROCEDURE — 1090F PRES/ABSN URINE INCON ASSESS: CPT | Performed by: FAMILY MEDICINE

## 2021-11-10 PROCEDURE — G8399 PT W/DXA RESULTS DOCUMENT: HCPCS | Performed by: FAMILY MEDICINE

## 2021-11-10 PROCEDURE — 1036F TOBACCO NON-USER: CPT | Performed by: FAMILY MEDICINE

## 2021-11-10 PROCEDURE — G8417 CALC BMI ABV UP PARAM F/U: HCPCS | Performed by: FAMILY MEDICINE

## 2021-11-10 PROCEDURE — 99214 OFFICE O/P EST MOD 30 MIN: CPT | Performed by: FAMILY MEDICINE

## 2021-11-10 RX ORDER — GABAPENTIN 300 MG/1
CAPSULE ORAL
Qty: 180 CAPSULE | Refills: 1 | Status: SHIPPED | OUTPATIENT
Start: 2021-11-10 | End: 2022-05-09

## 2021-11-10 SDOH — ECONOMIC STABILITY: FOOD INSECURITY: WITHIN THE PAST 12 MONTHS, THE FOOD YOU BOUGHT JUST DIDN'T LAST AND YOU DIDN'T HAVE MONEY TO GET MORE.: NEVER TRUE

## 2021-11-10 SDOH — ECONOMIC STABILITY: FOOD INSECURITY: WITHIN THE PAST 12 MONTHS, YOU WORRIED THAT YOUR FOOD WOULD RUN OUT BEFORE YOU GOT MONEY TO BUY MORE.: NEVER TRUE

## 2021-11-10 ASSESSMENT — SOCIAL DETERMINANTS OF HEALTH (SDOH): HOW HARD IS IT FOR YOU TO PAY FOR THE VERY BASICS LIKE FOOD, HOUSING, MEDICAL CARE, AND HEATING?: NOT HARD AT ALL

## 2021-11-17 ASSESSMENT — ENCOUNTER SYMPTOMS
BACK PAIN: 0
COUGH: 0
EYES NEGATIVE: 1
VOMITING: 0
NAUSEA: 0
SHORTNESS OF BREATH: 0
ABDOMINAL PAIN: 0
DIARRHEA: 0
CHEST TIGHTNESS: 0
RHINORRHEA: 0
SORE THROAT: 0

## 2021-11-17 NOTE — PROGRESS NOTES
[address] [place] [address]  Dept: [phone]  Dept Fax: [phone]  Loc: [phone]  PROGRESS NOTE      VisitDate: 11/10/2021    Arun Celis is a 78 y.o. female who presents today for:     Chief Complaint   Patient presents with    Pre-op Exam     Left hip replacement at [facility] [place] 11/29/2021, pre-op testing done.  Discuss Medications     discuss if she should continue the aspirin 81         Subjective:  HPI  Patient comes in for degenerative joint disease left hip. Scheduled for hip replacement the end of this month. She is complete her preop testing. No history of reactions to anesthesia. No history of bleeding or clotting disorders. Continues to have hip discomfort and limited mobility, she is failed conservative management. Review of Systems   Constitutional: Negative for activity change, appetite change, fatigue and fever. HENT: Negative for congestion, rhinorrhea and sore throat. Eyes: Negative. Respiratory: Negative for cough, chest tightness and shortness of breath. Cardiovascular: Negative for chest pain and palpitations. Gastrointestinal: Negative for abdominal pain, diarrhea, nausea and vomiting. Genitourinary: Negative for dysuria and urgency. Musculoskeletal: Positive for arthralgias and gait problem. Negative for back pain. Neurological: Negative for dizziness and headaches. Psychiatric/Behavioral: Negative for dysphoric mood. The patient is not nervous/anxious.       Past Medical History:   Diagnosis Date    Angiomyolipoma     left kidney    Arthritis     hands    Blood transfusion reaction     Cancer (HCC)     left breast    Cancer of skin     Chronic kidney disease     Stage III    GERD (gastroesophageal reflux disease)     Hyperlipidemia     Hypertension     Osteopenia     Osteoporosis     Prolonged emergence from general anesthesia       Past Surgical History: Procedure Laterality Date    APPENDECTOMY  1971    BACK INJECTION Left 9/14/2021    left sacroiliac joint injection performed by Wenceslao Ellis DO at [address] [facility] [address] LUMPECTOMY  2009    left    BREAST SURGERY  70's    left biopsy    BREAST SURGERY  2009    Lumpectomy of the Left breast   [address]  2006    COLONOSCOPY  [address] [place] [address], [facility]. INJECTION PROCEDURE FOR SACROILIAC JOINT Left 12/1/2020    left SI joint injection performed by Wenceslao Ellis DO at [address] [place] [address]    HIP SURGERY Left 5/25/2021    left intra-articular hip injection performed by Wenceslao Ellis DO at [facility] [facility] 2021    LEG SURGERY Right 07/07/2021    cancer removed from right leg    PAIN MANAGEMENT PROCEDURE N/A 3/30/2021    caudal epidural steroid [facility] 8/3/2021    LEFT L4 and L5 transforaminal epidural steroid injection performed by Wenceslao Ellis DO at [facility] Right 06/2013    Squemous cell cancer Right leg    SKIN BIOPSY Left 12/02/2013    Squemous cell cancer Left leg    SKIN CANCER EXCISION Left 12/02/2013    Exc SCC Left Lower Leg with flap,    SCC Right lower leg  7/7/21    TOTAL NEPHRECTOMY  2006    right     Family History   Problem Relation Age of Onset    Cancer Mother         melanoma, tongue    Hypertension Mother     High Cholesterol Mother     Heart Failure Father      Social History     Tobacco Use    Smoking status: Never Smoker    Smokeless tobacco: Never Used   Substance Use Topics    Alcohol use: No      Current Outpatient Medications   Medication Sig Dispense Refill    gabapentin (NEURONTIN) 300 MG capsule TAKE 1 CAPSULE BY MOUTH TWICE DAILY 180 capsule 1    losartan (COZAAR) 25 MG tablet TAKE 1 TABLET BY MOUTH EVERY DAY 90 tablet 1    ketotifen (ZADITOR) 0.025 % ophthalmic solution Place 1 drop into both eyes 2 times daily      Propylene Glycol (SYSTANE COMPLETE OP) Apply 1 drop to eye 2 times daily      calcitonin (MIACALCIN) 200 UNIT/ACT nasal spray PLACE 1 SPRAY IN ONE NOSTRIL ONCE DAILY ALTERNATING NOSTRILS DAILY 11.1 mL 3    atorvastatin (LIPITOR) 10 MG tablet TAKE 1 TABLET BY MOUTH EVERY DAY 90 tablet 3    lansoprazole (PREVACID SOLUTAB) 15 MG disintegrating tablet Take 15 mg by mouth daily      acetaminophen (TYLENOL) 500 MG tablet Take 500 mg by mouth as needed       Calcium + D (CALTRATE) 600-200 MG-UNIT tablet Take 2 tablets by mouth daily.  therapeutic multivitamin-minerals (THERAGRAN-M) tablet Take 1 tablet by mouth daily.  oxyCODONE-acetaminophen (PERCOCET) 5-325 MG per tablet Take 1 tablet by mouth every 8 hours as needed for Pain for up to 30 days. Intended supply: 3 days. Take lowest dose possible to manage pain (Patient not taking: Reported on 11/10/2021) 90 tablet 0    traMADol (ULTRAM) 50 MG tablet Take 25 mg by mouth every 6 hours as needed for Pain. (Patient not taking: Reported on 11/10/2021)       No current facility-administered medications for this visit.      Allergies   Allergen Reactions    Cipro Xr Swelling and Rash    Dye [Iodides]      Patient only has one kidney     Health Maintenance   Topic Date Due    DTaP/Tdap/Td vaccine (1 - Tdap) 01/02/1997    Shingles Vaccine (2 of 3) 06/12/2015    Annual Wellness Visit (AWV)  08/28/2021    COVID-19 Vaccine (3 - Booster for Moderna series) 09/12/2021    Lipid screen  04/06/2022    Potassium monitoring  11/03/2022    Creatinine monitoring  11/03/2022    Breast cancer screen  05/13/2023    Colon cancer screen colonoscopy  04/24/2025    DEXA (modify frequency per FRAX score)  Completed    Flu vaccine  Completed    Pneumococcal 65+ years Vaccine  Completed    Hepatitis C screen  Completed    Hepatitis A vaccine  Aged Out    Hepatitis B vaccine  Aged Out    Hib vaccine  Aged Out    Meningococcal (ACWY) vaccine  Aged Out         Objective:     Physical Exam  Constitutional:       General: She is not in acute distress. Appearance: She is well-developed. She is not diaphoretic. HENT:      Head: Normocephalic and atraumatic. Eyes:      General: No scleral icterus. Conjunctiva/sclera: Conjunctivae normal.   Neck:      Thyroid: No thyromegaly. Vascular: No JVD. Comments: No bruits  Cardiovascular:      Rate and Rhythm: Normal rate and regular rhythm. Heart sounds: Normal heart sounds. Pulmonary:      Effort: Pulmonary effort is normal. No respiratory distress. Breath sounds: Normal breath sounds. No wheezing or rales. Abdominal:      Palpations: Abdomen is soft. There is no mass. Tenderness: There is no abdominal tenderness. There is no guarding. Musculoskeletal:         General: No tenderness. Skin:     General: Skin is warm and dry. Findings: No rash. Neurological:      Mental Status: She is alert and oriented to person, place, and time. Cranial Nerves: No cranial nerve deficit. /80   Pulse 60   Temp 97.7 °F (36.5 °C) (Oral)   Resp 16   Wt 118 lb (53.5 kg)   SpO2 97%   BMI 25.53 kg/m²   EKG normal sinus rhythm  Chest x-ray clear no active disease  Lab Results   Component Value Date    WBC 6.3 11/03/2021    HGB 12.6 11/03/2021    HCT 39.7 11/03/2021    MCV 92.1 11/03/2021     11/03/2021     Lab Results   Component Value Date     11/03/2021    K 4.4 11/03/2021     11/03/2021    CO2 26 11/03/2021    BUN 13 11/03/2021    CREATININE 1.0 11/03/2021    GLUCOSE 88 11/03/2021    GLUCOSE 94 09/28/2021    CALCIUM 10.0 11/03/2021        Impression/Plan:  1. Primary osteoarthritis of left hip    2. Primary hypertension    3. Gastroesophageal reflux disease, unspecified whether esophagitis present    4.  Hyperlipidemia, unspecified hyperlipidemia type Requested Prescriptions     Signed Prescriptions Disp Refills    gabapentin (NEURONTIN) 300 MG capsule 180 capsule 1     Sig: TAKE 1 CAPSULE BY MOUTH TWICE DAILY     No orders of the defined types were placed in this encounter. There is no contraindication to planned surgery. Patient is cleared for the OR  Patient giveneducational materials - see patient instructions. Discussed use, benefit, and side effects of prescribed medications. All patient questions answered. Pt voiced understanding. Reviewed health maintenance. Patient agreedwith treatment plan. Follow up as directed. **This report has been created using voice recognition software. It may contain minor errorswhich are inherent in voice recognition technology. **       Electronically signed by Justen Vaughn MD on 11/17/2021 at 6:30 AM

## 2021-11-17 NOTE — PROGRESS NOTES
Pre-op clearance office visit, labs, cxr, ekg results were faxed to [facility] Dr. Mendoza Craft and to [facility]. Confirmation was received.

## 2021-11-26 ENCOUNTER — NURSE ONLY (OUTPATIENT)
Dept: FAMILY MEDICINE CLINIC | Age: 76
End: 2021-11-26
Payer: MEDICARE

## 2021-11-26 DIAGNOSIS — Z01.818 PRE-OP TESTING: Primary | ICD-10-CM

## 2021-11-26 LAB
Lab: NORMAL
PERFORMING INSTRUMENT: NORMAL
QC PASS/FAIL: NORMAL
SARS-COV-2, POC: NORMAL

## 2021-11-26 PROCEDURE — 87426 SARSCOV CORONAVIRUS AG IA: CPT | Performed by: NURSE PRACTITIONER

## 2021-12-10 ENCOUNTER — PATIENT MESSAGE (OUTPATIENT)
Dept: FAMILY MEDICINE CLINIC | Age: 76
End: 2021-12-10

## 2021-12-10 DIAGNOSIS — R60.0 BILATERAL LEG EDEMA: Primary | ICD-10-CM

## 2021-12-10 DIAGNOSIS — Z98.890 STATUS POST SURGERY: ICD-10-CM

## 2021-12-10 NOTE — TELEPHONE ENCOUNTER
From: Thuy Moran  To: Dr. Marti Parra: 12/10/2021 10:21 AM EST  Subject: Extensive swelling after surgery    Dr. Kevin Norwood,  Attached are pictures of my left foot and leg. My hip surgery was 11-29-21. I saw Dr. Minal Majano on Wednesday, 12-8-21. Everything is ok. I asked about the extensive swelling but neither Dr. Minal Majano or his PA felt it is a problem and gave me no ideas how to relieve the swelling. Would you have any suggestions? Sometimes the swelling is so much it is painful to walk. When I am not walking or exercising, I do have my legs elevated. Thank you for any help you can give me.   Wei Casper

## 2021-12-10 NOTE — TELEPHONE ENCOUNTER
Venous doppler lower extremities is ordered. Aranza is scheduled for 12-13-21 at 10:30, be there at 10:00 on second floor heart center. I called Aranza and notified her of testing. She is taking Eliquis per Dr Robbie Ashraf. She was told that if she has any SOB and pain/redness in the leg, she is to go to the ER. She verbalized understanding.

## 2021-12-13 ENCOUNTER — HOSPITAL ENCOUNTER (OUTPATIENT)
Dept: INTERVENTIONAL RADIOLOGY/VASCULAR | Age: 76
Discharge: HOME | End: 2021-12-13
Payer: MEDICARE

## 2021-12-13 DIAGNOSIS — R60.0 BILATERAL LEG EDEMA: ICD-10-CM

## 2021-12-13 DIAGNOSIS — Z98.890 STATUS POST SURGERY: ICD-10-CM

## 2021-12-13 PROCEDURE — 93970 EXTREMITY STUDY: CPT

## 2021-12-29 RX ORDER — CALCITONIN SALMON 200 [IU]/.09ML
SPRAY, METERED NASAL
Qty: 11.1 ML | Refills: 3 | Status: SHIPPED | OUTPATIENT
Start: 2021-12-29 | End: 2022-03-24

## 2022-02-03 LAB
ABSOLUTE BASO #: 0 X10E9/L (ref 0–0.2)
ABSOLUTE EOS #: 0.4 X10E9/L (ref 0–0.4)
ABSOLUTE LYMPH #: 1.5 X10E9/L (ref 1–3.5)
ABSOLUTE MONO #: 0.4 X10E9/L (ref 0–0.9)
ABSOLUTE NEUT #: 3.3 X10E9/L (ref 1.5–6.6)
ANION GAP SERPL CALCULATED.3IONS-SCNC: 10 MMOL/L (ref 5–15)
BASOPHILS RELATIVE PERCENT: 0.6 %
BUN BLDV-MCNC: 14 MG/DL (ref 5–27)
CALCIUM SERPL-MCNC: 9.7 MG/DL (ref 8.5–10.5)
CHLORIDE BLD-SCNC: 105 MMOL/L (ref 98–109)
CO2: 25 MMOL/L (ref 22–32)
CREAT SERPL-MCNC: 1.1 MG/DL (ref 0.4–1)
CREATINE, URINE: 105.67 MG/DL
EGFR AFRICAN AMERICAN: 59 ML/MIN/1.73SQ.M
EGFR IF NONAFRICAN AMERICAN: 49 ML/MIN/1.73SQ.M
EOSINOPHILS RELATIVE PERCENT: 7.9 %
GLUCOSE: 84 MG/DL (ref 65–99)
HCT VFR BLD CALC: 36.9 % (ref 35–47)
HEMOGLOBIN: 12.4 G/DL (ref 11.7–15.5)
LYMPHOCYTE %: 26.5 %
MCH RBC QN AUTO: 28.8 PG (ref 27–34)
MCHC RBC AUTO-ENTMCNC: 33.7 G/DL (ref 32–36)
MCV RBC AUTO: 86 FL (ref 80–100)
MICROALBUMIN/CREAT 24H UR: <0.7 MG/DL (ref 0–1.9)
MICROALBUMIN/CREAT UR-RTO: NORMAL MG/G CREAT (ref 0–30)
MONOCYTES # BLD: 7.1 %
NEUTROPHILS RELATIVE PERCENT: 57.9 %
PDW BLD-RTO: 13.9 % (ref 11.5–15)
PLATELETS: 341 X10E9/L (ref 150–450)
PMV BLD AUTO: 7.3 FL (ref 7–12)
POTASSIUM SERPL-SCNC: 5 MMOL/L (ref 3.5–5)
RBC: 4.31 X10E12/L (ref 3.8–5.2)
SODIUM BLD-SCNC: 140 MMOL/L (ref 134–146)
WBC: 5.6 X10E9/L (ref 4–11)

## 2022-02-10 ENCOUNTER — OFFICE VISIT (OUTPATIENT)
Dept: NEPHROLOGY | Age: 77
End: 2022-02-10
Payer: MEDICARE

## 2022-02-10 VITALS
TEMPERATURE: 97 F | WEIGHT: 117 LBS | SYSTOLIC BLOOD PRESSURE: 116 MMHG | DIASTOLIC BLOOD PRESSURE: 77 MMHG | BODY MASS INDEX: 25.32 KG/M2 | OXYGEN SATURATION: 97 % | HEART RATE: 102 BPM

## 2022-02-10 DIAGNOSIS — Z90.5 SOLITARY KIDNEY, ACQUIRED: ICD-10-CM

## 2022-02-10 DIAGNOSIS — N18.31 STAGE 3A CHRONIC KIDNEY DISEASE (HCC): Primary | ICD-10-CM

## 2022-02-10 DIAGNOSIS — D17.71 ANGIOLIPOMA OF KIDNEY: ICD-10-CM

## 2022-02-10 DIAGNOSIS — I12.9 HYPERTENSIVE RENAL DISEASE, STAGE 1 THROUGH STAGE 4 OR UNSPECIFIED CHRONIC KIDNEY DISEASE: ICD-10-CM

## 2022-02-10 PROCEDURE — G8484 FLU IMMUNIZE NO ADMIN: HCPCS | Performed by: INTERNAL MEDICINE

## 2022-02-10 PROCEDURE — 99213 OFFICE O/P EST LOW 20 MIN: CPT | Performed by: INTERNAL MEDICINE

## 2022-02-10 PROCEDURE — 1036F TOBACCO NON-USER: CPT | Performed by: INTERNAL MEDICINE

## 2022-02-10 PROCEDURE — 1123F ACP DISCUSS/DSCN MKR DOCD: CPT | Performed by: INTERNAL MEDICINE

## 2022-02-10 PROCEDURE — 4040F PNEUMOC VAC/ADMIN/RCVD: CPT | Performed by: INTERNAL MEDICINE

## 2022-02-10 PROCEDURE — G8427 DOCREV CUR MEDS BY ELIG CLIN: HCPCS | Performed by: INTERNAL MEDICINE

## 2022-02-10 PROCEDURE — G8417 CALC BMI ABV UP PARAM F/U: HCPCS | Performed by: INTERNAL MEDICINE

## 2022-02-10 PROCEDURE — G8399 PT W/DXA RESULTS DOCUMENT: HCPCS | Performed by: INTERNAL MEDICINE

## 2022-02-10 PROCEDURE — 1090F PRES/ABSN URINE INCON ASSESS: CPT | Performed by: INTERNAL MEDICINE

## 2022-02-10 PROCEDURE — 3017F COLORECTAL CA SCREEN DOC REV: CPT | Performed by: INTERNAL MEDICINE

## 2022-02-10 RX ORDER — CEPHALEXIN 500 MG/1
500 CAPSULE ORAL
COMMUNITY
Start: 2022-02-07 | End: 2022-03-24 | Stop reason: ALTCHOICE

## 2022-02-10 NOTE — PROGRESS NOTES
[address] [facility]  [address]  [facility] [address]  Dept: [phone]  Loc: [phone]  Office Progress Note  2/10/2022 8:53 AM      Pt Name:    Destiny Alexander  YOB: 1945  Primary Care Physician:  Cadence Mejia MD     Chief Complaint:   Chief Complaint   Patient presents with    Chronic Kidney Disease     Stage 3        Background Information/Interval History:   79 yo white female with hx angiomyolipoma s/p Right nephrectomy (), CKD III, hx breast cancer s/p lumpectomy/Chemo/XRT left side (Dr. Constantine Payton)  who is here for 4 months follow-up. No known heart problems. No coronary stents. No PPM or AICD. No hx smoking. No hx CVA. No hx kidney stones. No recent NSAID use. No leg swelling. She reports BP has been stable in 110-120 range. She is on cozaar. No urinary complaints. Retired from accounting work. No hematuria. No bleeding or flank pain.      Past History:  Past Medical History:   Diagnosis Date    Angiomyolipoma     left kidney    Arthritis     hands    Blood transfusion reaction     Cancer ([address]     left breast    Cancer of skin     Chronic kidney disease     Stage III    GERD (gastroesophageal reflux disease)     Hyperlipidemia     Hypertension     Osteopenia     Osteoporosis     Prolonged emergence from general anesthesia      Past Surgical History:   Procedure Laterality Date    APPENDECTOMY  1971    BACK INJECTION Left 2021    left sacroiliac joint injection performed by Kulwant Hendrix DO at [address] [facility] LUMPECTOMY  2009    left    BREAST SURGERY  70's    left biopsy    BREAST SURGERY  2009    Lumpectomy of the Left breast     SECTION  1971 and [facility]  2006    COLONOSCOPY  2004    [facility]. INJECTION PROCEDURE FOR SACROILIAC JOINT Left 2020    left SI joint injection performed by Kulwant Hendrix DO at [address] [facility] HEMORRHOID SURGERY  2004    HIP SURGERY Left 5/25/2021    left intra-articular hip injection performed by Leighann Wilson DO at [address] [facility] Left 2021    LEG SURGERY Right 07/07/2021    cancer removed from right leg    PAIN MANAGEMENT PROCEDURE N/A 3/30/2021    caudal epidural steroid injection performed by Leighann [address] DO at [address] Left 8/3/2021    LEFT L4 and L5 transforaminal epidural steroid injection performed by Leighann Wilson DO at [facility] Right 06/2013    Squemous cell cancer Right leg    SKIN BIOPSY Left 12/02/2013    Squemous cell cancer Left leg    SKIN CANCER EXCISION Left 12/02/2013    Exc SCC Left Lower Leg with flap,    SCC Right lower leg  7/7/21    TOTAL NEPHRECTOMY  2006    right        VITALS:  /77 (Site: Right Upper Arm, Position: Sitting, Cuff Size: Medium Adult)   Pulse 102   Temp 97 °F (36.1 °C)   Wt 117 lb (53.1 kg)   SpO2 97%   BMI 25.32 kg/m²   Wt Readings from Last 3 Encounters:   02/10/22 117 lb (53.1 kg)   11/10/21 118 lb (53.5 kg)   10/07/21 121 lb (54.9 kg)     Body mass index is 25.32 kg/m².      General Appearance: alert and cooperative with exam, appears comfortable, no distress  Lungs: Air entry B/L, no crackles or rales, no use of accessory muscles  Heart: S1, S2 heard  GI: soft, non-tender, no guarding  Extremities: No sig LE edema     Medications:  Current Outpatient Medications   Medication Sig Dispense Refill    cephALEXin (KEFLEX) 500 MG capsule Take 500 mg by mouth 4 tablets 30 min prior dermatology procedure      calcitonin (MIACALCIN) 200 UNIT/ACT nasal spray PLACE 1 SPRAY IN ONE NOSTRIL ONCE DAILY ALTERNATING NOSTRILS DAILY 11.1 mL 3    gabapentin (NEURONTIN) 300 MG capsule TAKE 1 CAPSULE BY MOUTH TWICE DAILY 180 capsule 1    losartan (COZAAR) 25 MG tablet TAKE 1 TABLET BY MOUTH EVERY DAY 90 tablet 1    ketotifen (ZADITOR) 0.025 % ophthalmic solution Place 1 drop into both eyes 2 times daily      Propylene Glycol (SYSTANE COMPLETE OP) Apply 1 drop to eye 2 times daily      atorvastatin (LIPITOR) 10 MG tablet TAKE 1 TABLET BY MOUTH EVERY DAY 90 tablet 3    lansoprazole (PREVACID SOLUTAB) 15 MG disintegrating tablet Take 15 mg by mouth daily      acetaminophen (TYLENOL) 500 MG tablet Take 500 mg by mouth as needed       Calcium + D (CALTRATE) 600-200 MG-UNIT tablet Take 2 tablets by mouth daily.  therapeutic multivitamin-minerals (THERAGRAN-M) tablet Take 1 tablet by mouth daily. No current facility-administered medications for this visit. Laboratory & Diagnostics:  Old labs  UA November 2021 no blood, no protein  Feb 2022: K 5.0, creat 1.10  Hb 12.4, Plt 341       Impression/Plan:   1. CKD III: stable renal function. Electrolytes are stable  2. Angiomyolipoma of kidney s/p right nephrectomy: Will obtain a follow-up US to evaluate left kidney since last US 4 years ago showed angiomyolipoma. Discussed with patient. 3. Angiomyolipoma of left kidney, multiple lesions seen on last ultrasound in April 2018. No Ultrasounds has been done  since then. Will obtain ultrasound. Discussed in detail with patient. May need to repeat CT abdomen without and with contrast to evaluate these lesions further. She will get ultrasound done next week depending on which we will then plan to obtain a CT scan  4. HTN: on losartan, BP is stable. Advised low-salt diet. Bring some blood pressure readings. 5. HX breast cancer s/p lumpectomy  6. Hx skin cancer    Orders Placed This Encounter   Procedures    US RENAL COMPLETE    Basic Metabolic Panel    Urinalysis    Protein / creatinine ratio, urine    Vitamin D 25 Hydroxy    PTH, Intact    Phosphorus     Return in about 6 months (around 8/10/2022).     Chun Moreno MD  [facility]

## 2022-02-17 ENCOUNTER — HOSPITAL ENCOUNTER (OUTPATIENT)
Dept: ULTRASOUND IMAGING | Age: 77
Discharge: HOME | End: 2022-02-17
Payer: MEDICARE

## 2022-02-17 DIAGNOSIS — Z90.5 SOLITARY KIDNEY, ACQUIRED: ICD-10-CM

## 2022-02-17 DIAGNOSIS — D17.71 ANGIOLIPOMA OF KIDNEY: ICD-10-CM

## 2022-02-17 PROCEDURE — 76775 US EXAM ABDO BACK WALL LIM: CPT

## 2022-03-24 ENCOUNTER — OFFICE VISIT (OUTPATIENT)
Dept: FAMILY MEDICINE CLINIC | Age: 77
End: 2022-03-24
Payer: MEDICARE

## 2022-03-24 VITALS
HEART RATE: 88 BPM | BODY MASS INDEX: 25.97 KG/M2 | WEIGHT: 120 LBS | SYSTOLIC BLOOD PRESSURE: 120 MMHG | RESPIRATION RATE: 20 BRPM | DIASTOLIC BLOOD PRESSURE: 80 MMHG | TEMPERATURE: 98.2 F

## 2022-03-24 DIAGNOSIS — H10.33 ACUTE BACTERIAL CONJUNCTIVITIS OF BOTH EYES: Primary | ICD-10-CM

## 2022-03-24 PROCEDURE — G8427 DOCREV CUR MEDS BY ELIG CLIN: HCPCS | Performed by: FAMILY MEDICINE

## 2022-03-24 PROCEDURE — 1036F TOBACCO NON-USER: CPT | Performed by: FAMILY MEDICINE

## 2022-03-24 PROCEDURE — 3017F COLORECTAL CA SCREEN DOC REV: CPT | Performed by: FAMILY MEDICINE

## 2022-03-24 PROCEDURE — 99213 OFFICE O/P EST LOW 20 MIN: CPT | Performed by: FAMILY MEDICINE

## 2022-03-24 PROCEDURE — G8399 PT W/DXA RESULTS DOCUMENT: HCPCS | Performed by: FAMILY MEDICINE

## 2022-03-24 PROCEDURE — 1090F PRES/ABSN URINE INCON ASSESS: CPT | Performed by: FAMILY MEDICINE

## 2022-03-24 PROCEDURE — 1123F ACP DISCUSS/DSCN MKR DOCD: CPT | Performed by: FAMILY MEDICINE

## 2022-03-24 PROCEDURE — G8417 CALC BMI ABV UP PARAM F/U: HCPCS | Performed by: FAMILY MEDICINE

## 2022-03-24 PROCEDURE — G8484 FLU IMMUNIZE NO ADMIN: HCPCS | Performed by: FAMILY MEDICINE

## 2022-03-24 PROCEDURE — 4040F PNEUMOC VAC/ADMIN/RCVD: CPT | Performed by: FAMILY MEDICINE

## 2022-03-24 RX ORDER — LOSARTAN POTASSIUM 25 MG/1
TABLET ORAL
Qty: 90 TABLET | Refills: 3 | Status: SHIPPED | OUTPATIENT
Start: 2022-03-24

## 2022-03-24 RX ORDER — GENTAMICIN SULFATE 3 MG/ML
1 SOLUTION/ DROPS OPHTHALMIC 4 TIMES DAILY
Qty: 5 ML | Refills: 0 | Status: SHIPPED | OUTPATIENT
Start: 2022-03-24 | End: 2022-03-31

## 2022-03-24 RX ORDER — ATORVASTATIN CALCIUM 10 MG/1
TABLET, FILM COATED ORAL
Qty: 90 TABLET | Refills: 3 | Status: SHIPPED | OUTPATIENT
Start: 2022-03-24

## 2022-03-25 RX ORDER — LOSARTAN POTASSIUM 25 MG/1
TABLET ORAL
Qty: 90 TABLET | Refills: 3 | OUTPATIENT
Start: 2022-03-25

## 2022-03-25 RX ORDER — ATORVASTATIN CALCIUM 10 MG/1
TABLET, FILM COATED ORAL
Qty: 90 TABLET | Refills: 3 | OUTPATIENT
Start: 2022-03-25

## 2022-03-26 ASSESSMENT — ENCOUNTER SYMPTOMS
CHEST TIGHTNESS: 0
NAUSEA: 0
EYE DISCHARGE: 1
EYE REDNESS: 1
SORE THROAT: 0
EYE ITCHING: 1
BACK PAIN: 0
VOMITING: 0
ABDOMINAL PAIN: 0
DIARRHEA: 0
SHORTNESS OF BREATH: 0
COUGH: 0
RHINORRHEA: 0

## 2022-03-26 NOTE — PROGRESS NOTES
[address] [facility] [address]  Dept: [phone]  Dept Fax: [phone]  Loc: [phone]  PROGRESS NOTE      VisitDate: 3/24/2022    Zak Ureña is a 78 y.o. female who presents today for:     Chief Complaint   Patient presents with    Conjunctivitis     eyes are red R>L. itching, watery, feels like sand is in them, matted this morning. Sx started last night.  Discuss Medications     Replacing Miacalcin. Daughter suggest Prolia.  Medication Refill         Subjective:  HPI  Patient comes in with bilateral eye redness discharge matting. Present for a few days right worse than the left. No other symptoms patient also was told to discontinue her Miacalcin nasal spray. Her nephrologist had put her on this years ago and has been continued. She recently changed to a new nephrologist they recommended she discontinue it. Records not available for me to review to see exactly why it was started    Review of Systems   Constitutional: Negative for activity change, appetite change, fatigue and fever. HENT: Negative for congestion, rhinorrhea and sore throat. Eyes: Positive for discharge, redness and itching. Respiratory: Negative for cough, chest tightness and shortness of breath. Cardiovascular: Negative for chest pain and palpitations. Gastrointestinal: Negative for abdominal pain, diarrhea, nausea and vomiting. Genitourinary: Negative for dysuria and urgency. Musculoskeletal: Negative for arthralgias and back pain. Neurological: Negative for dizziness and headaches. Psychiatric/Behavioral: Negative for dysphoric mood. The patient is not nervous/anxious.       Past Medical History:   Diagnosis Date    Angiomyolipoma     left kidney    Arthritis     hands    Blood transfusion reaction     Cancer (HCC)     left breast    Cancer of skin     Chronic kidney disease     Stage III    GERD (gastroesophageal reflux disease)     Hyperlipidemia     Hypertension     Osteopenia     Osteoporosis     Prolonged emergence from general anesthesia       Past Surgical History:   Procedure Laterality Date    APPENDECTOMY  1971    BACK INJECTION Left 9/14/2021    left sacroiliac joint injection performed by Andre Baltazar DO at [address] [facility] LUMPECTOMY  2009    left    BREAST SURGERY  70's    left biopsy    BREAST SURGERY  2009    Lumpectomy of the Left breast   [address]  2006    COLONOSCOPY  2004    [facility]. INJECTION PROCEDURE FOR SACROILIAC JOINT Left 12/1/2020    left SI joint injection performed by Andre Baltazar DO at [facility]  2004    HIP SURGERY Left 5/25/2021    left intra-articular hip injection performed by Andre Baltazar DO at [address] [facility] 2021    LEG SURGERY Right 07/07/2021    cancer removed from right leg    PAIN MANAGEMENT PROCEDURE N/A 3/30/2021    caudal epidural steroid injection performed by Andre Baltazar DO at [address] Left 8/3/2021    LEFT L4 and L5 transforaminal epidural steroid injection performed by Andre Baltazar DO at [facility] Right 06/2013    Squemous cell cancer Right leg    SKIN BIOPSY Left 12/02/2013    Squemous cell cancer Left leg    SKIN CANCER EXCISION Left 12/02/2013    Exc SCC Left Lower Leg with flap,    SCC Right lower leg  7/7/21    TOTAL NEPHRECTOMY  2006    right     Family History   Problem Relation Age of Onset    Cancer Mother         melanoma, tongue    Hypertension Mother     High Cholesterol Mother     Heart Failure Father      Social History     Tobacco Use    Smoking status: Never Smoker    Smokeless tobacco: Never Used   Substance Use Topics    Alcohol use: No      Current Outpatient Medications   Medication Sig Dispense Refill    losartan (COZAAR) 25 MG tablet TAKE 1 TABLET BY MOUTH EVERY DAY 90 tablet 3    atorvastatin (LIPITOR) 10 MG tablet TAKE 1 TABLET BY MOUTH EVERY DAY 90 tablet 3    gentamicin (GARAMYCIN) 0.3 % ophthalmic solution Place 1 drop into both eyes 4 times daily for 7 days 5 mL 0    gabapentin (NEURONTIN) 300 MG capsule TAKE 1 CAPSULE BY MOUTH TWICE DAILY 180 capsule 1    ketotifen (ZADITOR) 0.025 % ophthalmic solution Place 1 drop into both eyes 2 times daily      Propylene Glycol (SYSTANE COMPLETE OP) Apply 1 drop to eye 2 times daily      lansoprazole (PREVACID SOLUTAB) 15 MG disintegrating tablet Take 15 mg by mouth daily      acetaminophen (TYLENOL) 500 MG tablet Take 500 mg by mouth as needed       Calcium + D (CALTRATE) 600-200 MG-UNIT tablet Take 2 tablets by mouth daily.  therapeutic multivitamin-minerals (THERAGRAN-M) tablet Take 1 tablet by mouth daily. No current facility-administered medications for this visit. Allergies   Allergen Reactions    Cipro Xr Swelling and Rash    Dye [Iodides]      Patient only has one kidney     Health Maintenance   Topic Date Due    DTaP/Tdap/Td vaccine (1 - Tdap) 01/02/1997    Shingles Vaccine (2 of 3) 06/12/2015    Annual Wellness Visit (AWV)  08/28/2021    Lipid screen  04/06/2022    Depression Screen  04/08/2022    Potassium monitoring  02/02/2023    Creatinine monitoring  02/02/2023    Colorectal Cancer Screen  04/24/2025    DEXA (modify frequency per FRAX score)  Completed    Flu vaccine  Completed    Pneumococcal 65+ years Vaccine  Completed    COVID-19 Vaccine  Completed    Hepatitis C screen  Completed    Hepatitis A vaccine  Aged Out    Hepatitis B vaccine  Aged Out    Hib vaccine  Aged Out    Meningococcal (ACWY) vaccine  Aged Out         Objective:     Physical Exam  Constitutional:       General: She is not in acute distress. Appearance: She is well-developed. She is not diaphoretic.    HENT: voice recognition technology. **       Electronically signed by Emelia Yu MD on 3/26/2022 at 8:33 AM

## 2022-05-09 RX ORDER — GABAPENTIN 300 MG/1
CAPSULE ORAL
Qty: 180 CAPSULE | Refills: 1 | Status: SHIPPED | OUTPATIENT
Start: 2022-05-09 | End: 2022-08-09

## 2022-05-23 ENCOUNTER — HOSPITAL ENCOUNTER (OUTPATIENT)
Dept: WOMENS IMAGING | Age: 77
Discharge: HOME | End: 2022-05-23
Payer: MEDICARE

## 2022-05-23 DIAGNOSIS — Z78.0 POSTMENOPAUSAL STATUS: ICD-10-CM

## 2022-05-23 DIAGNOSIS — C50.412 MALIGNANT NEOPLASM OF UPPER-OUTER QUADRANT OF LEFT FEMALE BREAST, UNSPECIFIED ESTROGEN RECEPTOR STATUS (HCC): ICD-10-CM

## 2022-05-23 PROCEDURE — 77080 DXA BONE DENSITY AXIAL: CPT

## 2022-06-24 ENCOUNTER — OFFICE VISIT (OUTPATIENT)
Dept: FAMILY MEDICINE CLINIC | Age: 77
End: 2022-06-24
Payer: MEDICARE

## 2022-06-24 VITALS
BODY MASS INDEX: 24.98 KG/M2 | HEIGHT: 58 IN | SYSTOLIC BLOOD PRESSURE: 110 MMHG | WEIGHT: 119 LBS | TEMPERATURE: 98.1 F | HEART RATE: 108 BPM | DIASTOLIC BLOOD PRESSURE: 76 MMHG | RESPIRATION RATE: 20 BRPM | OXYGEN SATURATION: 99 %

## 2022-06-24 DIAGNOSIS — M85.80 OSTEOPENIA AFTER MENOPAUSE: Primary | ICD-10-CM

## 2022-06-24 DIAGNOSIS — Z78.0 OSTEOPENIA AFTER MENOPAUSE: Primary | ICD-10-CM

## 2022-06-24 PROCEDURE — 1090F PRES/ABSN URINE INCON ASSESS: CPT | Performed by: FAMILY MEDICINE

## 2022-06-24 PROCEDURE — 3017F COLORECTAL CA SCREEN DOC REV: CPT | Performed by: FAMILY MEDICINE

## 2022-06-24 PROCEDURE — G8399 PT W/DXA RESULTS DOCUMENT: HCPCS | Performed by: FAMILY MEDICINE

## 2022-06-24 PROCEDURE — G8420 CALC BMI NORM PARAMETERS: HCPCS | Performed by: FAMILY MEDICINE

## 2022-06-24 PROCEDURE — 1123F ACP DISCUSS/DSCN MKR DOCD: CPT | Performed by: FAMILY MEDICINE

## 2022-06-24 PROCEDURE — G8427 DOCREV CUR MEDS BY ELIG CLIN: HCPCS | Performed by: FAMILY MEDICINE

## 2022-06-24 PROCEDURE — 1036F TOBACCO NON-USER: CPT | Performed by: FAMILY MEDICINE

## 2022-06-24 PROCEDURE — 99213 OFFICE O/P EST LOW 20 MIN: CPT | Performed by: FAMILY MEDICINE

## 2022-06-24 RX ORDER — CYCLOBENZAPRINE HCL 10 MG
TABLET ORAL
Qty: 30 TABLET | Refills: 2 | Status: SHIPPED | OUTPATIENT
Start: 2022-06-24 | End: 2022-09-13

## 2022-06-24 RX ORDER — CYCLOBENZAPRINE HCL 5 MG
TABLET ORAL
COMMUNITY
Start: 2022-05-26 | End: 2022-06-24 | Stop reason: SDUPTHER

## 2022-06-24 RX ORDER — IBANDRONATE SODIUM 150 MG/1
150 TABLET, FILM COATED ORAL
Qty: 3 TABLET | Refills: 3 | Status: SHIPPED | OUTPATIENT
Start: 2022-06-24

## 2022-06-24 ASSESSMENT — PATIENT HEALTH QUESTIONNAIRE - PHQ9
SUM OF ALL RESPONSES TO PHQ QUESTIONS 1-9: 0
1. LITTLE INTEREST OR PLEASURE IN DOING THINGS: 0
2. FEELING DOWN, DEPRESSED OR HOPELESS: 0
SUM OF ALL RESPONSES TO PHQ QUESTIONS 1-9: 0
SUM OF ALL RESPONSES TO PHQ9 QUESTIONS 1 & 2: 0

## 2022-06-25 ASSESSMENT — ENCOUNTER SYMPTOMS
RHINORRHEA: 0
COUGH: 0
SHORTNESS OF BREATH: 0
ABDOMINAL PAIN: 0
VOMITING: 0
NAUSEA: 0
EYES NEGATIVE: 1
BACK PAIN: 0
CHEST TIGHTNESS: 0
SORE THROAT: 0
DIARRHEA: 0

## 2022-06-25 NOTE — PROGRESS NOTES
[address] [place] [place] [address]  Dept: [phone]  Dept Fax: [phone]  Loc: [phone]  PROGRESS NOTE      VisitDate: 6/24/2022    Cornelia Cloud is a 78 y.o. female who presents today for:     Chief Complaint   Patient presents with    Results     Discuss Dexa Scan-was on Miacalcin spray previously. Chu Ramirez wants PCP to prescribe new med.  Medication Refill         Subjective:  HPI  Patient comes in for follow-up bone density testing. DEXA scan showed osteopenia. She had previously been using Miacalcin that was prescribed by her nephrologist.  She had a change nephrologist and they took her off this medication. She would like to get started on a different medicine    Review of Systems   Constitutional: Negative for activity change, appetite change, fatigue and fever. HENT: Negative for congestion, rhinorrhea and sore throat. Eyes: Negative. Respiratory: Negative for cough, chest tightness and shortness of breath. Cardiovascular: Negative for chest pain and palpitations. Gastrointestinal: Negative for abdominal pain, diarrhea, nausea and vomiting. Genitourinary: Negative for dysuria and urgency. Musculoskeletal: Negative for arthralgias and back pain. Neurological: Negative for dizziness and headaches. Psychiatric/Behavioral: Negative for dysphoric mood. The patient is not nervous/anxious.       Past Medical History:   Diagnosis Date    Angiomyolipoma     left kidney    Arthritis     hands    Blood transfusion reaction     Cancer (HCC)     left breast    Cancer of skin     Chronic kidney disease     Stage III    GERD (gastroesophageal reflux disease)     Hyperlipidemia     Hypertension     Osteopenia     Osteoporosis     Prolonged emergence from general anesthesia       Past Surgical History:   Procedure Laterality Date    APPENDECTOMY  1971    BACK INJECTION Left 9/14/2021    left lie down for the next 60 minutes. 3 tablet 3    cyclobenzaprine (FLEXERIL) 10 MG tablet TAKE 1  TABLET BY MOUTH AT BEDTIME 30 tablet 2    gabapentin (NEURONTIN) 300 MG capsule TAKE 1 CAPSULE BY MOUTH TWICE DAILY 180 capsule 1    losartan (COZAAR) 25 MG tablet TAKE 1 TABLET BY MOUTH EVERY DAY 90 tablet 3    atorvastatin (LIPITOR) 10 MG tablet TAKE 1 TABLET BY MOUTH EVERY DAY 90 tablet 3    ketotifen (ZADITOR) 0.025 % ophthalmic solution Place 1 drop into both eyes 2 times daily      Propylene Glycol (SYSTANE COMPLETE OP) Apply 1 drop to eye 2 times daily      lansoprazole (PREVACID SOLUTAB) 15 MG disintegrating tablet Take 15 mg by mouth daily      acetaminophen (TYLENOL) 500 MG tablet Take 500 mg by mouth as needed       Calcium + D (CALTRATE) 600-200 MG-UNIT tablet Take 2 tablets by mouth daily.  therapeutic multivitamin-minerals (THERAGRAN-M) tablet Take 1 tablet by mouth daily. No current facility-administered medications for this visit. Allergies   Allergen Reactions    Cipro Xr Swelling and Rash    Dye [Iodides]      Patient only has one kidney     Health Maintenance   Topic Date Due    DTaP/Tdap/Td vaccine (1 - Tdap) 01/02/1997    Shingles vaccine (2 of 3) 06/12/2015    Annual Wellness Visit (AWV)  08/28/2021    Lipids  04/06/2022    Depression Screen  06/24/2023    Colorectal Cancer Screen  04/24/2025    DEXA (modify frequency per FRAX score)  Completed    Flu vaccine  Completed    Pneumococcal 65+ years Vaccine  Completed    COVID-19 Vaccine  Completed    Hepatitis C screen  Completed    Hepatitis A vaccine  Aged Out    Hepatitis B vaccine  Aged Out    Hib vaccine  Aged Out    Meningococcal (ACWY) vaccine  Aged Out         Objective:     Physical Exam  Constitutional:       General: She is not in acute distress. Appearance: She is well-developed. She is not diaphoretic. HENT:      Head: Normocephalic and atraumatic.    Eyes:      General: No scleral icterus. Conjunctiva/sclera: Conjunctivae normal.   Neck:      Thyroid: No thyromegaly. Vascular: No JVD. Comments: No bruits  Cardiovascular:      Rate and Rhythm: Normal rate and regular rhythm. Heart sounds: Normal heart sounds. Pulmonary:      Effort: Pulmonary effort is normal. No respiratory distress. Breath sounds: Normal breath sounds. No wheezing or rales. Abdominal:      Palpations: Abdomen is soft. There is no mass. Tenderness: There is no abdominal tenderness. There is no guarding. Musculoskeletal:         General: No tenderness. Skin:     General: Skin is warm and dry. Findings: No rash. Neurological:      Mental Status: She is alert and oriented to person, place, and time. Cranial Nerves: No cranial nerve deficit. /76   Pulse (!) 108   Temp 98.1 °F (36.7 °C) (Oral)   Resp 20   Ht 4' 10\" (1.473 m)   Wt 119 lb (54 kg)   SpO2 99%   BMI 24.87 kg/m²       Impression/Plan:  1. Osteopenia after menopause      Requested Prescriptions     Signed Prescriptions Disp Refills    ibandronate (BONIVA) 150 MG tablet 3 tablet 3     Sig: Take 1 tablet by mouth every 30 days Take one (1) tablet once per month in the morning with a full glass of water, on an empty stomach, and do not take anything else by mouth or lie down for the next 60 minutes.  cyclobenzaprine (FLEXERIL) 10 MG tablet 30 tablet 2     Sig: TAKE 1  TABLET BY MOUTH AT BEDTIME     No orders of the defined types were placed in this encounter. Options reviewed with the patient. Orders as above. Patient giveneducational materials - see patient instructions. Discussed use, benefit, and side effects of prescribed medications. All patient questions answered. Pt voiced understanding. Reviewed health maintenance. Patient agreedwith treatment plan. Follow up as directed. **This report has been created using voice recognition software.  It may contain minor errorswhich are inherent in voice recognition technology. **       Electronically signed by Yudy Armenta MD on 6/25/2022 at 6:59 AM

## 2022-08-02 LAB
ANION GAP SERPL CALCULATED.3IONS-SCNC: 11 MEQ/L (ref 7–16)
APPEARANCE: CLEAR
BACTERIA: ABNORMAL PER HPF
BILIRUBIN: NEGATIVE
BUN BLDV-MCNC: 19 MG/DL (ref 8–23)
CALCIUM SERPL-MCNC: 10 MG/DL (ref 8.5–10.5)
CHLORIDE BLD-SCNC: 100 MEQ/L (ref 95–107)
CO2: 28 MEQ/L (ref 19–31)
COLOR: YELLOW
CREAT SERPL-MCNC: 1.42 MG/DL (ref 0.6–1.3)
CREATINE, URINE: 88.5 MG/DL
EGFR IF NONAFRICAN AMERICAN: 39 ML/MIN/1.73
EPITHELIAL CELLS: ABNORMAL PER HPF (ref 0–10)
GLUCOSE BLD-MCNC: NEGATIVE MG/DL
GLUCOSE: 97 MG/DL (ref 70–99)
HYALINE CASTS: ABNORMAL
KETONES, URINE: NEGATIVE
LEUKOCYTE ESTERASE, URINE: ABNORMAL
NITRITE, URINE: NEGATIVE
OCCULT BLOOD,URINE: NEGATIVE
PH: 6.5 (ref 5–9)
PHOSPHORUS: 4 MG/DL (ref 2.5–4.5)
POTASSIUM SERPL-SCNC: 4.9 MEQ/L (ref 3.5–5.4)
PROTEIN, URINE: 6 MG/DL
PROTEIN, URINE: NEGATIVE
PROTEIN/CREAT RATIO: 68 MG/G
RBC: ABNORMAL PER HPF (ref 0–5)
SODIUM BLD-SCNC: 139 MEQ/L (ref 133–146)
SP GRAVITY MISCELLANEOUS: 1.01 (ref 1–1.03)
UROBILINOGEN, URINE: 0.2
VITAMIN D 25-HYDROXY: 55 NG/ML
WBC: ABNORMAL PER HPF (ref 0–5)

## 2022-08-03 LAB — PARATHYROID HORMONE INTACT: 73 PG/ML (ref 15–65)

## 2022-08-08 ENCOUNTER — OFFICE VISIT (OUTPATIENT)
Dept: NEPHROLOGY | Age: 77
End: 2022-08-08
Payer: MEDICARE

## 2022-08-08 VITALS
DIASTOLIC BLOOD PRESSURE: 77 MMHG | HEART RATE: 104 BPM | BODY MASS INDEX: 25.5 KG/M2 | OXYGEN SATURATION: 97 % | SYSTOLIC BLOOD PRESSURE: 120 MMHG | WEIGHT: 122 LBS

## 2022-08-08 DIAGNOSIS — I12.9 HYPERTENSIVE RENAL DISEASE, STAGE 1 THROUGH STAGE 4 OR UNSPECIFIED CHRONIC KIDNEY DISEASE: ICD-10-CM

## 2022-08-08 DIAGNOSIS — Z90.5 SOLITARY KIDNEY, ACQUIRED: ICD-10-CM

## 2022-08-08 DIAGNOSIS — N18.31 CHRONIC KIDNEY DISEASE, STAGE 3A (HCC): Primary | ICD-10-CM

## 2022-08-08 DIAGNOSIS — D17.71 ANGIOMYOLIPOMA OF KIDNEY: ICD-10-CM

## 2022-08-08 PROCEDURE — 1123F ACP DISCUSS/DSCN MKR DOCD: CPT | Performed by: INTERNAL MEDICINE

## 2022-08-08 PROCEDURE — 1036F TOBACCO NON-USER: CPT | Performed by: INTERNAL MEDICINE

## 2022-08-08 PROCEDURE — G8417 CALC BMI ABV UP PARAM F/U: HCPCS | Performed by: INTERNAL MEDICINE

## 2022-08-08 PROCEDURE — G8427 DOCREV CUR MEDS BY ELIG CLIN: HCPCS | Performed by: INTERNAL MEDICINE

## 2022-08-08 PROCEDURE — 1090F PRES/ABSN URINE INCON ASSESS: CPT | Performed by: INTERNAL MEDICINE

## 2022-08-08 PROCEDURE — 99213 OFFICE O/P EST LOW 20 MIN: CPT | Performed by: INTERNAL MEDICINE

## 2022-08-08 PROCEDURE — G8399 PT W/DXA RESULTS DOCUMENT: HCPCS | Performed by: INTERNAL MEDICINE

## 2022-08-08 PROCEDURE — 3017F COLORECTAL CA SCREEN DOC REV: CPT | Performed by: INTERNAL MEDICINE

## 2022-08-08 RX ORDER — CEPHALEXIN 500 MG/1
500 CAPSULE ORAL PRN
COMMUNITY

## 2022-08-08 NOTE — PROGRESS NOTES
[address] [facility]  [address] [address]  Dept: [phone]  Loc: [phone]  Office Progress Note  8/8/2022 3:04 PM      Pt Name:    Tim Waller:    1945  Primary Care Physician:  Anny Hawk MD     Chief Complaint:   Chief Complaint   Patient presents with    Chronic Kidney Disease     Stage 3        Background Information/Interval History:   77 yo white female with hx angiomyolipoma s/p Right nephrectomy (2006), CKD III, hx breast cancer s/p lumpectomy/Chemo/XRT left side (Dr. Malissa Samuels)  who is here for 6 months follow-up. She is on cozaar. Here for 6 months follow-up. Pt has no sp complaints. She say she has no urinary complaints. No leg swelling. No leg swelling.  Daughter is a pharmacist.  Patient denies any urinary complaints     Past History:  Past Medical History:   Diagnosis Date    Angiomyolipoma     left kidney    Arthritis     hands    Blood transfusion reaction     Cancer ([address]     left breast    Cancer of skin     Chronic kidney disease     Stage III    GERD (gastroesophageal reflux disease)     Hyperlipidemia     Hypertension     Osteopenia     Osteoporosis     Prolonged emergence from general anesthesia      Past Surgical History:   Procedure Laterality Date    APPENDECTOMY  1971    BACK INJECTION Left 9/14/2021    left sacroiliac joint injection performed by Russell Sahni DO at [address] [facility] LUMPECTOMY  2009    left    BREAST SURGERY  70's    left biopsy    BREAST SURGERY  2009    Lumpectomy of the Left breast    [address] [facility]  2006    [place]    [facility] [place]. INJECTION PROCEDURE FOR SACROILIAC JOINT Left 12/1/2020    left SI joint injection performed by Russell Sahni DO at [address] [facility] [address]  2004    HIP SURGERY Left 5/25/2021    left intra-articular hip injection performed by Nisha Yeager DO [place] at [address] [place] [address] [place] (CERVIX STATUS UNKNOWN)  1985    KNEE CARTILAGE SURGERY Left 2021    LEG SURGERY Right 07/07/2021    cancer removed from right leg    PAIN MANAGEMENT PROCEDURE N/A 3/30/2021    caudal epidural steroid injection performed by Phil Muñoz DO at [facility] Left 8/3/2021    LEFT L4 and L5 transforaminal epidural steroid injection performed by Phil Muñoz DO at [facility] Right 06/2013    Squemous cell cancer Right leg    SKIN BIOPSY Left 12/02/2013    Squemous cell cancer Left leg    SKIN CANCER EXCISION Left 12/02/2013    Exc SCC Left Lower Leg with flap,    SCC Right lower leg  7/7/21    TOTAL NEPHRECTOMY  2006    right        VITALS:  /77 (Site: Right Upper Arm, Position: Sitting, Cuff Size: Medium Adult)   Pulse (!) 104   Wt 122 lb (55.3 kg)   SpO2 97%   BMI 25.50 kg/m²   Wt Readings from Last 3 Encounters:   08/08/22 122 lb (55.3 kg)   06/24/22 119 lb (54 kg)   03/24/22 120 lb (54.4 kg)     Body mass index is 25.5 kg/m². General Appearance: alert and cooperative with exam, appears comfortable, no distress  Lungs: Air entry B/L, no crackles or rales, no use of accessory muscles  Heart: S1, S2 heard  GI: soft, non-tender, no guarding  Extremities: No sig LE edema     Medications:  Current Outpatient Medications   Medication Sig Dispense Refill    cephALEXin (KEFLEX) 500 MG capsule Take 500 mg by mouth as needed For the denisit and dermatologist      Dextromethorphan-guaiFENesin (ROBITUSSIN DM PO) Take by mouth      ibandronate (BONIVA) 150 MG tablet Take 1 tablet by mouth every 30 days Take one (1) tablet once per month in the morning with a full glass of water, on an empty stomach, and do not take anything else by mouth or lie down for the next 60 minutes.  3 tablet 3    cyclobenzaprine (FLEXERIL) 10 MG tablet TAKE 1  TABLET BY MOUTH AT BEDTIME 30 tablet 2    gabapentin (NEURONTIN) 300 MG capsule TAKE 1 CAPSULE BY MOUTH TWICE DAILY 180 capsule 1    losartan (COZAAR) 25 MG tablet TAKE 1 TABLET BY MOUTH EVERY DAY 90 tablet 3    atorvastatin (LIPITOR) 10 MG tablet TAKE 1 TABLET BY MOUTH EVERY DAY 90 tablet 3    ketotifen (ZADITOR) 0.025 % ophthalmic solution Place 1 drop into both eyes 2 times daily      Propylene Glycol (SYSTANE COMPLETE OP) Apply 1 drop to eye 2 times daily      lansoprazole (PREVACID SOLUTAB) 15 MG disintegrating tablet Take 15 mg by mouth daily      acetaminophen (TYLENOL) 500 MG tablet Take 500 mg by mouth as needed       Calcium + D (CALTRATE) 600-200 MG-UNIT tablet Take 2 tablets by mouth daily. therapeutic multivitamin-minerals (THERAGRAN-M) tablet Take 1 tablet by mouth daily. No current facility-administered medications for this visit. Laboratory & Diagnostics:  US: Right nephrectomy, + angiomyolipoma  UA November 2021 no blood, no protein  Feb 2022: K 5.0, creat 1.10  Hb 12.4, Plt 341    Aug 2022: K 4.9, Creat 1.42, phos 4.0, UPCR 68 mg/g, UA: no blood, no protein, PTH 73  UPCR 68 mg/g       Impression/Plan:   1. CKD III: Electrolytes are stable. Creat is little higher this time but still stable. Advised to increase water intake. Recheck creat in 4 weeks. Previously creatinine was 1.1 and now up to 1.4.  2. Angiomyolipoma of kidney s/p right nephrectomy (2006) stable US, based on comparison from 2020 (CT scan)  3. Angiomyolipoma of left kidney: US in Feb 2022 showed stable angiomyolipoma based on comparison to Aug 2020 CT scan. Will repeat US in 6 months from now. Discussed with patient. If anything changes then will need to see urology. Continue with serial ultrasound scans  4. HTN: on losartan  5. HX breast cancer s/p lumpectomy  6. Hx skin cancer  Orders Placed This Encounter   Procedures    US RENAL COMPLETE    Basic Metabolic Panel    Creatinine     Return in about 6 months (around 2/8/2023).       Jaja Partida MD  [facility]

## 2022-09-09 LAB
CREAT SERPL-MCNC: 1.3 MG/DL (ref 0.6–1.3)
EGFR IF NONAFRICAN AMERICAN: 43 ML/MIN/1.73

## 2022-09-13 RX ORDER — CYCLOBENZAPRINE HCL 10 MG
TABLET ORAL
Qty: 30 TABLET | Refills: 2 | Status: SHIPPED | OUTPATIENT
Start: 2022-09-13 | End: 2022-09-19 | Stop reason: SDUPTHER

## 2022-09-18 ENCOUNTER — PATIENT MESSAGE (OUTPATIENT)
Dept: FAMILY MEDICINE CLINIC | Age: 77
End: 2022-09-18

## 2022-09-19 RX ORDER — CYCLOBENZAPRINE HCL 10 MG
TABLET ORAL
Qty: 90 TABLET | Refills: 3 | Status: SHIPPED | OUTPATIENT
Start: 2022-09-19

## 2022-09-19 NOTE — TELEPHONE ENCOUNTER
From: Nohemy Berry  To: Dr. Saulo Garcia: 9/18/2022 4:58 PM EDT  Subject: Cyclobenzaprine 10 mg tablets    If it is still ok to take the cyclobenzaprine I would appreciate it if you would send a new prescription for this as a 90 day supply as all my other prescriptions are for this amount for 1 year. Please send the prescription to [facility].   Thank you  Jeannette Cook

## 2022-10-04 ENCOUNTER — HOSPITAL ENCOUNTER (OUTPATIENT)
Dept: ULTRASOUND IMAGING | Age: 77
Discharge: HOME | End: 2022-10-04
Payer: MEDICARE

## 2022-10-04 DIAGNOSIS — Z90.5 SOLITARY KIDNEY, ACQUIRED: ICD-10-CM

## 2022-10-04 DIAGNOSIS — I12.9 HYPERTENSIVE RENAL DISEASE, STAGE 1 THROUGH STAGE 4 OR UNSPECIFIED CHRONIC KIDNEY DISEASE: ICD-10-CM

## 2022-10-04 PROCEDURE — 76770 US EXAM ABDO BACK WALL COMP: CPT

## 2022-11-08 RX ORDER — GABAPENTIN 300 MG/1
CAPSULE ORAL
Qty: 180 CAPSULE | Refills: 1 | Status: SHIPPED | OUTPATIENT
Start: 2022-11-08 | End: 2023-02-07

## 2023-01-01 NOTE — PROGRESS NOTES
Chronic Pain/PM&R Clinic Note     Encounter Date: 5/17/21    Subjective:   Chief Complaint:   Chief Complaint   Patient presents with    Follow Up After Procedure       History of Present Illness:   Blima Hodgkin is a 78 y.o. female seen in the clinic initially on 11/16/20 upon request from Perez Adamse DO (orthopedic surgery) for her history of chronic left groin and posterior thigh pain. She states that her pain started after a fall on ice back in February 2020. She describes her pain as a constant ache in the left buttocks with radiation down the posterior thigh and in to the right groin down the front of the thigh. She states that the pain is typically 4/10 on a day-to-day basis. She states she saw a chiropractor when initially happened which did give her some relief but then her pain returned at the end of April. She denies any focal leg weakness, paresthesias, saddle anesthesia, bowel/bladder incontinence. Her pain comes and goes and she is not sure what exacerbates her pain. She does get relief with the occasional use of Tylenol and tramadol. She states that she did actually suffer a meniscus tear about 2 weeks ago and is slated to have an MRI next week for evaluation. She states she has completed physical therapy for her low back and continues with home core strengthening exercises. Today, 5/17/2021, patient presents for planned follow-up for left groin/hip pain. She underwent a caudal epidural steroid injection on 3/30/2021. She feels like she obtained significant relief in her left groin and leg pain after her injection. She feels like her left hip is still bothersome. She feels like the pain is still on the posterior aspects of the buttocks with radiation around her lateral hip and into the groin at times. She is any new symptoms of focal leg weakness, leg paresthesias, saddle anesthesia, bowel/bladder incontinence.   She continues to take her gabapentin and tramadol intermittently to help with her pain. She is wondering if there is something wrong with the hip itself. History of Intervention:   Surgery: No previous low back surgeries  Injections: Left SI joint injection (12/1/20) - minimal relief  Caudal YAA (3/30/2021)greater than 50% pain relief x 6 weeks    Current Treatment Medications:   Tylenol PRN  Tramadol 25 mg (every 3rd day)  Gabapentin 300 mg BID    Historical Treatment Medications:   None    Imaging:  MRI Lspine (9/10/20)    PROCEDURE: MRI LUMBAR SPINE WO CONTRAST         CLINICAL INFORMATION: Lumbar radiculopathy. Low back pain radiating into left hip and groin for 7 months after fall.         COMPARISON: Lumbar spine radiographs dated 6/22/2020.         TECHNIQUE: Sagittal and axial T1 and T2-weighted images were obtained through the lumbar spine.         FINDINGS:    There is a dextrocurvature of the lumbar spine, similar to prior radiographs. There is grade 1 anterolisthesis of L4 relative to L5 and grade 2 anterolisthesis of L5 relative to S1 on the basis of degenerative change, also stable compared to prior    radiographs. There is otherwise anatomic vertebral body height and alignment. There is disc space narrowing and mild osteophytosis at T11-12 with hyperintense T1 and T2 signal at the opposing endplates as evidence for Modic 2 degenerative change. Marrow    signal is otherwise within normal limits. The conus terminates in a normal fashion at the L1-2 level. Paraspinal soft tissues are unremarkable. At T12-L1 there is no significant spinal canal or neuroforaminal stenosis. At L1-2 there is no significant spinal canal or neuroforaminal stenosis. At L2-3 there is a shallow disc bulge without significant spinal canal or neuroforaminal stenosis. At L3-4 there is a shallow disc bulge without significant spinal canal or neuroforaminal stenosis.     At L4-5 there is partial uncovering of the disc with superimposed shallow disc bulge which contributes to mild spinal canal stenosis in association with facet hypertrophy and ligament flavum thickening. There is mild to moderate bilateral neural    foraminal stenosis. At L5-S1 there is partial uncovering of the disc with superimposed shallow disc bulge which contributes to moderate spinal canal stenosis in association with facet hypertrophy and ligamentum flavum thickening. There is flattening of the neural foramina    with moderate bilateral neural foraminal stenosis.           Impression     Multilevel degenerative changes most pronounced at L5-S1 where prominent facet hypertrophy and ligamentum flavum thickening cause grade 2 anterolisthesis of L5 relative to S1 with partial uncovering the disc with superimposed disc bulge contributing to    moderate spinal canal stenosis and moderate bilateral neural foraminal stenosis.                  XR Left Hip (6/2/20)    PROCEDURE: XR HIP LEFT (2-3 VIEWS)       CLINICAL INFORMATION: Left hip pain.       COMPARISON: No prior study.       TECHNIQUE: AP and frog-leg lateral views of the left hip performed.           FINDINGS:    POST OPERATIVE: None.       ALIGNMENT: Anatomic.       MINERALIZATION: Normal.       FRACTURE: None.       HIP JOINT: Unremarkable.       LEFT SACROILIAC JOINT:    1. Mild sclerosis with associated erosive changes along the sacral and iliac margins of the visualized portions of the left sacroiliac joint consistent with a chronic sacroiliitis.       SYMPHYSIS:    1. Mild degenerative sclerosis at the symphysis.        Past Medical History:   Diagnosis Date    Angiomyolipoma     left kidney    Arthritis     hands    Blood transfusion reaction     Cancer (HCC)     left breast    Cancer of skin     Chronic kidney disease     Stage III    GERD (gastroesophageal reflux disease)     Hyperlipidemia     Hypertension     Osteopenia     Osteoporosis     Prolonged emergence from general anesthesia        Past Surgical History:   Procedure Laterality Date    APPENDECTOMY  1971    BREAST LUMPECTOMY  2009    left    BREAST SURGERY  70's    left biopsy    BREAST SURGERY  2009    Lumpectomy of the Left breast     SECTION  1971 and [address]  2006    COLONOSCOPY  2004    [facility] FOR SACROILIAC JOINT Left 2020    left SI joint injection performed by Jose Coley DO at [address] [facility]  2004   [address] Left     PAIN MANAGEMENT PROCEDURE N/A 3/30/2021    caudal epidural steroid injection performed by Jose Coley DO at [facility] Right 2013    Squemous cell cancer Right leg    SKIN BIOPSY Left 2013    Squemous cell cancer Left leg    SKIN CANCER EXCISION Left 13    Exc SCC Left Lower Leg with flap    TOTAL NEPHRECTOMY  2006    right       Family History   Problem Relation Age of Onset    Cancer Mother         melanoma, tongue    Hypertension Mother     High Cholesterol Mother     Heart Failure Father        Social History     Socioeconomic History    Marital status:      Spouse name: Not on file    Number of children: Not on file    Years of education: Not on file    Highest education level: Not on file   Occupational History     Employer: [employer]   Tobacco Use    Smoking status: Never Smoker    Smokeless tobacco: Never Used   Vaping Use    Vaping Use: Never used   Substance and Sexual Activity    Alcohol use: No    Drug use: No    Sexual activity: Not Currently   Other Topics Concern    Not on file   Social History Narrative    Not on file     Social Determinants of Health     Financial Resource Strain:     Difficulty of Paying Living Expenses:    Food Insecurity:     Worried About Running Out of Food in the Last Year:     Ran Out of Food in the Last Year:    Transportation Needs:     Lack of Transportation (Medical):      Lack of Transportation (Non-Medical):    Physical Activity:     Days of Exercise per Week:     Minutes of Exercise per Session:    Stress:     Feeling of Stress :    Social Connections:     Frequency of Communication with Friends and Family:     Frequency of Social Gatherings with Friends and Family:     Attends Bahai Services:     Active Member of Clubs or Organizations:     Attends Club or Organization Meetings:     Marital Status:    Intimate Partner Violence:     Fear of Current or Ex-Partner:     Emotionally Abused:     Physically Abused:     Sexually Abused:        Medications & Allergies:   Current Outpatient Medications   Medication Instructions    acetaminophen (TYLENOL) 500 mg, Oral, PRN    ALPRAZolam (XANAX) 0.25 mg, Oral, NIGHTLY PRN    aspirin 81 mg, DAILY    atorvastatin (LIPITOR) 10 MG tablet TAKE 1 TABLET BY MOUTH EVERY DAY    calcitonin (MIACALCIN) 200 UNIT/ACT nasal spray PLACE 1 SPRAY IN ONE NOSTRIL ONCE DAILY ALTERNATING NOSTRILS DAILY    Calcium + D (CALTRATE) 600-200 MG-UNIT tablet 2 tablets, DAILY    gabapentin (NEURONTIN) 300 mg, Oral, 2 TIMES DAILY    lansoprazole (PREVACID SOLUTAB) 15 mg, Oral, DAILY    losartan (COZAAR) 25 MG tablet TAKE 1 TABLET BY MOUTH EVERY DAY    therapeutic multivitamin-minerals (THERAGRAN-M) tablet 1 tablet, DAILY    traMADol (ULTRAM) 25 mg, Oral, EVERY 6 HOURS PRN       Allergies   Allergen Reactions    Cipro Xr Swelling and Rash    Dye [Iodides]      Patient only has one kidney       Review of Systems:   Constitutional: negative for weight changes or fevers  Genitourinary: negative for bowel/bladder incontinence   Musculoskeletal: left groin pain/hip pain  Neurological: Negative for focal leg weakness or numbness/tingling  Behavioral/Psych: positive for anxiety/depression   All other systems reviewed and are negative    Objective:     Vitals:    05/17/21 1428   BP: 128/74     Constitutional: Pleasant, no acute distress   Head: Normocephalic, atraumatic Eyes: Conjunctivae normal   Neck: Supple, symmetrical   Lungs: Normal respiratory effort, non-labored breathing   Cardiovascular: Limbs warm and well perfused   Abdomen: Non-protruded   Musculoskeletal: Muscle bulk symmetric, no atrophy, no gross deformities   · Lumbar Spine: Leg length discrepancy appreciated (left leg 1 inch longer than right). Lumbar paraspinals non-tender bilaterally. SLR neg bilaterally. JOSÉ LUIS equivocal on left. Positive FADIR on left. Negative facet loading bilaterally. Neurological: Cranial nerves II-XII grossly intact. · Gait - Antalgic gait. Ambulates with assist device. · Motor: No focal motor deficit appreciated lower limbs  · Sensory: LT sensation intact in lower limbs   Skin: No rashes or lesions visible in low back  Psychological: Cooperative, no exaggerated pain behaviors       Assessment:    Diagnosis Orders   1. Left hip pain  CHG FLUOR NEEDLE/CATH SPINE/PARASPINAL DX/THER ADDON    MN ARTHROCENTESIS ASPIR&/INJ MAJOR JT/BURSA W/O US   2. Spinal stenosis of lumbar region, unspecified whether neurogenic claudication present     3. Other chronic pain     4. Radicular leg pain         Shadi Almeida is a 78 y. o.female presenting to the pain clinic for evaluation of chronic left buttocks and anterior groin pain. Her clinical history and physical examination show a mixed pattern of pain that could be potentially coming from the lumbar spine from spinal stenosis versus SI joint dysfunction versus left intrinsic hip pathology. She failed to respond to a left SI joint injection and has continued anterior groin pain. I have set her up for a caudal epidural steroid injection to see if this is potentially coming from the spine particularly from the L4/L5 and L5/S1 level as any interlaminar or transforaminal injection at these levels would be very difficult to access the epidural space.       She responded somewhat to a caudal epidural steroid injection however has continued left hip pain.  I have set her up for a left intra-articular hip injection under fluoroscopy. She may potentially have a labral tear contributing to her pain. Plan: The following treatment recommendations and plan were discussed in detail with Bairon Ortiz. Imaging:   I have reviewed patients imaging of MRI L-spine and left hip XR and results were discussed with patient today. Analgesics:   Patient is taking Acetaminophen. Patient informed that the maximum amount of acetaminophen taken on a regular basis should only be 4000 mg per day. Adjuvants:   None    Interventions: With clinical history and physical examination consistent with left intra-articular hip pathology, I have set the patient up for a left intra-articular hip injection under fluoroscopy. Patient is agreement wants to proceed with this procedure. Anticoagulation/NPO Recommendations:   Patient does not need to hold any medications prior to the procedure. Patient will need to be NPO x 8 hours prior to the procedure. We will start an IV prior to the procedure. Multidisciplinary Pain Management:   In the presence of complex, chronic, and multi-factorial pain, the importance of a multidisciplinary approach to pain management in the patients management regimen was emphasized and discussed in great detail.    PHYSICAL THERAPY: I encouraged the patient to continue her core strengthening exercise program    Referrals:  None    Prescriptions Written This Visit:   None    Follow-up: For left intra-articular hip injection      Ge Arias, DO  Interventional Pain Management/PM&R   [place]

## 2023-02-01 LAB
ANION GAP SERPL CALCULATED.3IONS-SCNC: 10 MEQ/L (ref 7–16)
BUN BLDV-MCNC: 14 MG/DL (ref 8–23)
CALCIUM SERPL-MCNC: 9.7 MG/DL (ref 8.5–10.5)
CHLORIDE BLD-SCNC: 100 MEQ/L (ref 95–107)
CO2: 29 MEQ/L (ref 19–31)
CREAT SERPL-MCNC: 1.38 MG/DL (ref 0.6–1.3)
EGFR IF NONAFRICAN AMERICAN: 40 ML/MIN/1.73
GLUCOSE: 85 MG/DL (ref 70–99)
POTASSIUM SERPL-SCNC: 4.7 MEQ/L (ref 3.5–5.4)
SODIUM BLD-SCNC: 139 MEQ/L (ref 133–146)

## 2023-02-09 ENCOUNTER — OFFICE VISIT (OUTPATIENT)
Dept: NEPHROLOGY | Age: 78
End: 2023-02-09
Payer: MEDICARE

## 2023-02-09 VITALS
HEART RATE: 94 BPM | BODY MASS INDEX: 26.63 KG/M2 | DIASTOLIC BLOOD PRESSURE: 82 MMHG | OXYGEN SATURATION: 98 % | WEIGHT: 127.4 LBS | SYSTOLIC BLOOD PRESSURE: 132 MMHG

## 2023-02-09 DIAGNOSIS — N18.31 CHRONIC KIDNEY DISEASE, STAGE 3A (HCC): Primary | ICD-10-CM

## 2023-02-09 DIAGNOSIS — D17.71 ANGIOMYOLIPOMA OF KIDNEY: ICD-10-CM

## 2023-02-09 DIAGNOSIS — I12.9 HYPERTENSIVE RENAL DISEASE, STAGE 1 THROUGH STAGE 4 OR UNSPECIFIED CHRONIC KIDNEY DISEASE: ICD-10-CM

## 2023-02-09 PROCEDURE — G8417 CALC BMI ABV UP PARAM F/U: HCPCS | Performed by: INTERNAL MEDICINE

## 2023-02-09 PROCEDURE — 1036F TOBACCO NON-USER: CPT | Performed by: INTERNAL MEDICINE

## 2023-02-09 PROCEDURE — 3079F DIAST BP 80-89 MM HG: CPT | Performed by: INTERNAL MEDICINE

## 2023-02-09 PROCEDURE — G8399 PT W/DXA RESULTS DOCUMENT: HCPCS | Performed by: INTERNAL MEDICINE

## 2023-02-09 PROCEDURE — 3075F SYST BP GE 130 - 139MM HG: CPT | Performed by: INTERNAL MEDICINE

## 2023-02-09 PROCEDURE — G8484 FLU IMMUNIZE NO ADMIN: HCPCS | Performed by: INTERNAL MEDICINE

## 2023-02-09 PROCEDURE — 3017F COLORECTAL CA SCREEN DOC REV: CPT | Performed by: INTERNAL MEDICINE

## 2023-02-09 PROCEDURE — G8427 DOCREV CUR MEDS BY ELIG CLIN: HCPCS | Performed by: INTERNAL MEDICINE

## 2023-02-09 PROCEDURE — 1123F ACP DISCUSS/DSCN MKR DOCD: CPT | Performed by: INTERNAL MEDICINE

## 2023-02-09 PROCEDURE — 1090F PRES/ABSN URINE INCON ASSESS: CPT | Performed by: INTERNAL MEDICINE

## 2023-02-09 PROCEDURE — 99213 OFFICE O/P EST LOW 20 MIN: CPT | Performed by: INTERNAL MEDICINE

## 2023-02-09 NOTE — PROGRESS NOTES
[address] [facility]  [address]  [facility] [address]  Dept: [phone]  Loc: [phone]  Office Progress Note  2/9/2023 9:50 AM      Pt Name:    Phillip Angelo  YOB: 1945  Primary Care Physician:  Bruce Abarca MD     Chief Complaint:   Chief Complaint   Patient presents with    Follow-up     CKD III        Background Information/Interval History:   79 yo white female with hx angiomyolipoma s/p Right nephrectomy (2006), CKD III, hx breast cancer s/p lumpectomy/Chemo/XRT left side (Dr. Ann Marie Perkins)  who is here for 6 months follow-up. She is on cozaar. Daughter is a pharmacist.     Here for 6 months follow-up. Feels well. Bp is stable. She sees Dr. Ann Marie Perkins once a year. She has no urinary complaints.       Past History:  Past Medical History:   Diagnosis Date    Angiomyolipoma     left kidney    Arthritis     hands    Blood transfusion reaction     Cancer ([address]     left breast    Cancer of skin     Chronic kidney disease     Stage III    GERD (gastroesophageal reflux disease)     Hyperlipidemia     Hypertension     Osteopenia     Osteoporosis     Prolonged emergence from general anesthesia      Past Surgical History:   Procedure Laterality Date    APPENDECTOMY  1971    BACK INJECTION Left 9/14/2021    left sacroiliac joint injection performed by Adrian Cummings DO at [address] [facility] LUMPECTOMY  2009    left    BREAST SURGERY  70's    left biopsy    BREAST SURGERY  2009    Lumpectomy of the Left breast    [address] [facility]  2006    COLONOSCOPY  2004    [facility] INJECTION PROCEDURE FOR SACROILIAC JOINT Left 12/1/2020    left SI joint injection performed by Adrian Cummings DO at [address] [facility] [address]  2004    HIP SURGERY Left 5/25/2021    left intra-articular hip injection performed by Adrian Cummings DO at [address] [facility] (CERVIX STATUS UNKNOWN)  1985    KNEE CARTILAGE SURGERY Left 2021    LEG SURGERY Right 07/07/2021    cancer removed from right leg    PAIN MANAGEMENT PROCEDURE N/A 3/30/2021    caudal epidural steroid injection performed by Adore Chou DO at [facility] Left 8/3/2021    LEFT L4 and L5 transforaminal epidural steroid injection performed by Adore Chou DO at [place] [address] Right 06/2013    Squemous cell cancer Right leg    SKIN BIOPSY Left 12/02/2013    Squemous cell cancer Left leg    SKIN CANCER EXCISION Left 12/02/2013    Exc SCC Left Lower Leg with flap,    SCC Right lower leg  7/7/21    TOTAL NEPHRECTOMY  2006    right        VITALS:  /82 (Site: Right Upper Arm, Position: Sitting, Cuff Size: Large Adult)   Pulse 94   Wt 127 lb 6.4 oz (57.8 kg)   SpO2 98%   BMI 26.63 kg/m²   Wt Readings from Last 3 Encounters:   02/09/23 127 lb 6.4 oz (57.8 kg)   08/08/22 122 lb (55.3 kg)   06/24/22 119 lb (54 kg)     Body mass index is 26.63 kg/m².      General Appearance: alert and cooperative with exam, appears comfortable, no distress  Lungs: Air entry B/L, no crackles or rales, no use of accessory muscles  Heart: S1, S2 heard  GI: soft, non-tender, no guarding  Extremities: No sig LE edema     Medications:  Current Outpatient Medications   Medication Sig Dispense Refill    gabapentin (NEURONTIN) 300 MG capsule TAKE 1 CAPSULE BY MOUTH TWICE DAILY 180 capsule 1    cyclobenzaprine (FLEXERIL) 10 MG tablet Take 1 tablet by mouth at bedtime 90 tablet 3    cephALEXin (KEFLEX) 500 MG capsule Take 500 mg by mouth as needed For the denisit and dermatologist      Dextromethorphan-guaiFENesin (ROBITUSSIN DM PO) Take by mouth      ibandronate (BONIVA) 150 MG tablet Take 1 tablet by mouth every 30 days Take one (1) tablet once per month in the morning with a full glass of water, on an empty stomach, and do not take anything else by mouth or lie down for the next 60 minutes. 3 tablet 3    losartan (COZAAR) 25 MG tablet TAKE 1 TABLET BY MOUTH EVERY DAY 90 tablet 3    atorvastatin (LIPITOR) 10 MG tablet TAKE 1 TABLET BY MOUTH EVERY DAY 90 tablet 3    ketotifen (ZADITOR) 0.025 % ophthalmic solution Place 1 drop into both eyes 2 times daily      Propylene Glycol (SYSTANE COMPLETE OP) Apply 1 drop to eye 2 times daily      lansoprazole (PREVACID SOLUTAB) 15 MG disintegrating tablet Take 15 mg by mouth daily      acetaminophen (TYLENOL) 500 MG tablet Take 500 mg by mouth as needed       Calcium + D (CALTRATE) 600-200 MG-UNIT tablet Take 2 tablets by mouth daily. therapeutic multivitamin-minerals (THERAGRAN-M) tablet Take 1 tablet by mouth daily. No current facility-administered medications for this visit. Laboratory & Diagnostics:  US: Right nephrectomy, + angiomyolipoma  UA November 2021 no blood, no protein  Feb 2022: K 5.0, creat 1.10  Hb 12.4, Plt 341    Aug 2022: K 4.9, Creat 1.42, phos 4.0, UPCR 68 mg/g, UA: no blood, no protein, PTH 73  UPCR 68 mg/g    Feb 2023: Creat 1.38, K 4.7     Impression/Plan:   1. CKD III: stable. Advised low salt diet. Avoid dehydration. Increase water intake. 2. Angiomyolipoma of kidney s/p right nephrectomy (2006) stable US, based on comparison from 2020 (CT scan)  3. Angiomyolipoma of left kidney: US in Feb 2022 showed stable angiomyolipoma based on comparison to Aug 2020 CT scan. Pt used to see Dr. Shelly Gomez. Dr. Gloria Shore was subsequently monitoring. Will refer to urology given her history. Patient agreed. Patient is very worried about angiomyolipoma  4. HTN: on losartan  5. HX breast cancer s/p lumpectomy  6. Hx skin cancer    Orders Placed This Encounter   Procedures    US RENAL COMPLETE    Basic Metabolic Panel    [facility]. [facility]     Return in about 1 year (around 2/9/2024).     Griffin Woodward MD  [facility]

## 2023-02-23 ENCOUNTER — OFFICE VISIT (OUTPATIENT)
Dept: UROLOGY | Age: 78
End: 2023-02-23
Payer: MEDICARE

## 2023-02-23 ENCOUNTER — TELEPHONE (OUTPATIENT)
Dept: UROLOGY | Age: 78
End: 2023-02-23

## 2023-02-23 VITALS — WEIGHT: 125 LBS | RESPIRATION RATE: 18 BRPM | BODY MASS INDEX: 26.24 KG/M2 | HEIGHT: 58 IN

## 2023-02-23 DIAGNOSIS — D17.9 ANGIOMYOLIPOMA: Primary | ICD-10-CM

## 2023-02-23 PROCEDURE — 1090F PRES/ABSN URINE INCON ASSESS: CPT | Performed by: UROLOGY

## 2023-02-23 PROCEDURE — 3017F COLORECTAL CA SCREEN DOC REV: CPT | Performed by: UROLOGY

## 2023-02-23 PROCEDURE — G8399 PT W/DXA RESULTS DOCUMENT: HCPCS | Performed by: UROLOGY

## 2023-02-23 PROCEDURE — G8427 DOCREV CUR MEDS BY ELIG CLIN: HCPCS | Performed by: UROLOGY

## 2023-02-23 PROCEDURE — G8484 FLU IMMUNIZE NO ADMIN: HCPCS | Performed by: UROLOGY

## 2023-02-23 PROCEDURE — 99204 OFFICE O/P NEW MOD 45 MIN: CPT | Performed by: UROLOGY

## 2023-02-23 PROCEDURE — 1036F TOBACCO NON-USER: CPT | Performed by: UROLOGY

## 2023-02-23 PROCEDURE — 81003 URINALYSIS AUTO W/O SCOPE: CPT | Performed by: UROLOGY

## 2023-02-23 PROCEDURE — G8417 CALC BMI ABV UP PARAM F/U: HCPCS | Performed by: UROLOGY

## 2023-02-23 PROCEDURE — 1123F ACP DISCUSS/DSCN MKR DOCD: CPT | Performed by: UROLOGY

## 2023-02-23 NOTE — TELEPHONE ENCOUNTER
Patient scheduled for MRI ABD WO  at [facility] MR on 3/16/23 ARRIVAL OF 10AM FOR A 1030AM SCAN. NPO 4 HOURS PRIOR TO SCAN.     Order mailed with instructions to the patient

## 2023-02-23 NOTE — PROGRESS NOTES
Mildred Mckoy MD.    [address] [facility] [address]  Dept: [phone]  Dept Fax: [phone]  Loc: [address] [facility] ([place], [place]) Urology Office Note -     Patient:  Cheryl Rivera  YOB: 1945    The patient is a 78 y.o. female who presents today for evaluation of the following problems:   Chief Complaint   Patient presents with    New Patient     Sent by Nephrology, due to having small tumors on her single kidney. referred/consultation requested by Gómez Castellon MD.    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 Last BUN and creatinine:  Lab Results   Component Value Date    BUN 14 02/01/2023     Lab Results   Component Value Date    CREATININE 1.38 (H) 02/01/2023         Imaging Reviewed during this Office Visit:   Kyle Meraz MD independently reviewed the images and verified the radiology reports from:    US RENAL COMPLETE    Result Date: 10/4/2022  PROCEDURE: US RENAL COMPLETE CLINICAL INFORMATION: Hypertensive renal disease, angiomyolipoma TECHNIQUE: The kidneys and urinary bladder was performed. Grayscale and color images were obtained. COMPARISON: Renal ultrasound 2/17/2022 FINDINGS: The right kidney is surgically absent. The left kidney measures 11.0 x 4.2 x 5.8 cm.  Left renal cortical thickness is normal. Given differences in technique, a hyperechoic lesion at the mid left kidney is stable, again measuring 1.2 cm. A hyperechoic lesion at the lower left kidney is also stable, measuring 0.9 cm. There is no hydronephrosis. Color Doppler demonstrates expected wave forms in the left renal artery. The left arcuate resistive index is normal. The distended urinary bladder is unremarkable. There is no post void residual urine in the bladder. 1. Prior right-sided nephrectomy. 2. Hyperattenuating lesions in the left kidney, likely angiomyolipomas. 3. Unremarkable urinary bladder.  Final report electronically signed by Dr. Fransisca Adan on 10/4/2022 4:41 PM      PAST MEDICAL, FAMILY AND SOCIAL HISTORY:  Past Medical History:   Diagnosis Date    Angiomyolipoma     left kidney    Arthritis     hands    Blood transfusion reaction     Cancer ([facility] [address]     left breast    Cancer of skin     Chronic kidney disease     Stage III    GERD (gastroesophageal reflux disease)     Hyperlipidemia     Hypertension     Osteopenia     Osteoporosis     Prolonged emergence from general anesthesia      Past Surgical History:   Procedure Laterality Date    APPENDECTOMY  1971    BACK INJECTION Left 9/14/2021    left sacroiliac joint injection performed by Tj Gibson DO at [facility] LUMPECTOMY  2009    left    BREAST SURGERY  70's    left biopsy    BREAST SURGERY  2009    Lumpectomy of the Left breast    [address] [facility]  2006    COLONOSCOPY  2004    [facility]. INJECTION PROCEDURE FOR SACROILIAC JOINT Left 12/1/2020    left SI joint injection performed by Tj Gibson DO at [address]  2004    HIP SURGERY Left 5/25/2021    left intra-articular hip injection performed by jT Gibson DO at [address] [place], [place] [id number] [address] Left 2021    LEG SURGERY Right 07/07/2021    cancer removed from right leg PAIN MANAGEMENT PROCEDURE N/A 3/30/2021    caudal epidural steroid injection performed by Hershal Lesch, DO at [facility] Left 8/3/2021    LEFT L4 and L5 transforaminal epidural steroid injection performed by Hershal Lesch, DO at [place] [address] Right 06/2013    Squemous cell cancer Right leg    SKIN BIOPSY Left 12/02/2013    Squemous cell cancer Left leg    SKIN CANCER EXCISION Left 12/02/2013    Exc SCC Left Lower Leg with flap,    SCC Right lower leg  7/7/21    TOTAL NEPHRECTOMY  2006    right     Family History   Problem Relation Age of Onset    Cancer Mother         melanoma, tongue    Hypertension Mother     High Cholesterol Mother     Heart Failure Father      Outpatient Medications Marked as Taking for the 2/23/23 encounter (Office Visit) with Britton Fuentes MD   Medication Sig Dispense Refill    gabapentin (NEURONTIN) 300 MG capsule TAKE 1 CAPSULE BY MOUTH TWICE DAILY 180 capsule 1    cyclobenzaprine (FLEXERIL) 10 MG tablet Take 1 tablet by mouth at bedtime 90 tablet 3    cephALEXin (KEFLEX) 500 MG capsule Take 500 mg by mouth as needed For the denisit and dermatologist      Dextromethorphan-guaiFENesin (ROBITUSSIN DM PO) Take by mouth      ibandronate (BONIVA) 150 MG tablet Take 1 tablet by mouth every 30 days Take one (1) tablet once per month in the morning with a full glass of water, on an empty stomach, and do not take anything else by mouth or lie down for the next 60 minutes.  3 tablet 3    losartan (COZAAR) 25 MG tablet TAKE 1 TABLET BY MOUTH EVERY DAY 90 tablet 3    atorvastatin (LIPITOR) 10 MG tablet TAKE 1 TABLET BY MOUTH EVERY DAY 90 tablet 3    ketotifen (ZADITOR) 0.025 % ophthalmic solution Place 1 drop into both eyes 2 times daily      Propylene Glycol (SYSTANE COMPLETE OP) Apply 1 drop to eye 2 times daily      lansoprazole (PREVACID SOLUTAB) 15 MG disintegrating tablet Take 15 mg by mouth daily      Calcium + D (CALTRATE) 600-200 MG-UNIT tablet Take 2 tablets by mouth daily. therapeutic multivitamin-minerals (THERAGRAN-M) tablet Take 1 tablet by mouth daily. Cipro xr and Dye [iodides]  Social History     Tobacco Use   Smoking Status Never   Smokeless Tobacco Never      (If patient a smoker, smoking cessation counseling offered)   Social History     Substance and Sexual Activity   Alcohol Use No       REVIEW OF SYSTEMS:  Constitutional: negative  Eyes: negative  Respiratory: negative  Cardiovascular: negative  Gastrointestinal: negative  Genitourinary: see HPI  Musculoskeletal: negative  Skin: negative   Neurological: negative  Hematological/Lymphatic: negative  Psychological: negative        Physical Exam:    This a 78 y.o. female  Vitals:    02/23/23 1049   Resp: 18     Body mass index is 26.13 kg/m². Constitutional: Patient in no acute distress;       Assessment and Plan        1. Angiomyolipoma               Plan: AML- ckd 3. still small < 3 cm. Has solitary LEFT kidney (HAD AML on right side---was watched for 20 plus years). Will get MRI for better delineation and to ensure it is AML. Prefer embolization>>> partial nephrectomy if it continues to grow. Aftewards MRI, six month renal u/s. Prescriptions Ordered:  No orders of the defined types were placed in this encounter.      Orders Placed:  Orders Placed This Encounter   Procedures    POCT Urinalysis No Micro (Auto)            JUAN A Wilkinson MD

## 2023-03-20 RX ORDER — ATORVASTATIN CALCIUM 10 MG/1
TABLET, FILM COATED ORAL
Qty: 90 TABLET | Refills: 0 | Status: SHIPPED | OUTPATIENT
Start: 2023-03-20

## 2023-03-20 RX ORDER — LOSARTAN POTASSIUM 25 MG/1
TABLET ORAL
Qty: 90 TABLET | Refills: 0 | Status: SHIPPED | OUTPATIENT
Start: 2023-03-20

## 2023-04-01 ENCOUNTER — HOSPITAL ENCOUNTER (OUTPATIENT)
Dept: MRI IMAGING | Age: 78
Discharge: HOME | End: 2023-04-01
Payer: MEDICARE

## 2023-04-01 DIAGNOSIS — D17.9 ANGIOMYOLIPOMA: ICD-10-CM

## 2023-04-01 PROCEDURE — 74181 MRI ABDOMEN W/O CONTRAST: CPT

## 2023-04-18 ENCOUNTER — TELEPHONE (OUTPATIENT)
Dept: UROLOGY | Age: 78
End: 2023-04-18

## 2023-04-18 NOTE — TELEPHONE ENCOUNTER
Patient scheduled for US RENAL COMP  at [address] [place] [address] on 10/3/2023 ARRIVAL OF 1015AM FOR A 1030AM SCAN. NO CARBONATED BEVERAGES; ARRIVE WELL HYDRATED WITH A FULL BLADDER.     Order mailed with instructions  to the patient

## 2023-04-27 ENCOUNTER — OFFICE VISIT (OUTPATIENT)
Dept: FAMILY MEDICINE CLINIC | Age: 78
End: 2023-04-27

## 2023-04-27 ENCOUNTER — HOSPITAL ENCOUNTER (OUTPATIENT)
Age: 78
Discharge: HOME | End: 2023-04-27
Payer: MEDICARE

## 2023-04-27 ENCOUNTER — HOSPITAL ENCOUNTER (OUTPATIENT)
Dept: GENERAL RADIOLOGY | Age: 78
Discharge: HOME | End: 2023-04-27
Payer: MEDICARE

## 2023-04-27 VITALS
SYSTOLIC BLOOD PRESSURE: 138 MMHG | BODY MASS INDEX: 24.8 KG/M2 | OXYGEN SATURATION: 100 % | HEIGHT: 59 IN | WEIGHT: 123 LBS | DIASTOLIC BLOOD PRESSURE: 86 MMHG | RESPIRATION RATE: 12 BRPM | HEART RATE: 101 BPM

## 2023-04-27 DIAGNOSIS — M46.1 SACROILIITIS (HCC): ICD-10-CM

## 2023-04-27 DIAGNOSIS — M25.552 LEFT HIP PAIN: Primary | ICD-10-CM

## 2023-04-27 DIAGNOSIS — M25.552 LEFT HIP PAIN: ICD-10-CM

## 2023-04-27 PROCEDURE — 73502 X-RAY EXAM HIP UNI 2-3 VIEWS: CPT

## 2023-04-27 SDOH — ECONOMIC STABILITY: FOOD INSECURITY: WITHIN THE PAST 12 MONTHS, YOU WORRIED THAT YOUR FOOD WOULD RUN OUT BEFORE YOU GOT MONEY TO BUY MORE.: NEVER TRUE

## 2023-04-27 SDOH — ECONOMIC STABILITY: HOUSING INSECURITY
IN THE LAST 12 MONTHS, WAS THERE A TIME WHEN YOU DID NOT HAVE A STEADY PLACE TO SLEEP OR SLEPT IN A SHELTER (INCLUDING NOW)?: NO

## 2023-04-27 SDOH — HEALTH STABILITY: PHYSICAL HEALTH: ON AVERAGE, HOW MANY DAYS PER WEEK DO YOU ENGAGE IN MODERATE TO STRENUOUS EXERCISE (LIKE A BRISK WALK)?: 0 DAYS

## 2023-04-27 SDOH — HEALTH STABILITY: PHYSICAL HEALTH: ON AVERAGE, HOW MANY MINUTES DO YOU ENGAGE IN EXERCISE AT THIS LEVEL?: 0 MIN

## 2023-04-27 SDOH — ECONOMIC STABILITY: INCOME INSECURITY: HOW HARD IS IT FOR YOU TO PAY FOR THE VERY BASICS LIKE FOOD, HOUSING, MEDICAL CARE, AND HEATING?: NOT HARD AT ALL

## 2023-04-27 SDOH — ECONOMIC STABILITY: FOOD INSECURITY: WITHIN THE PAST 12 MONTHS, THE FOOD YOU BOUGHT JUST DIDN'T LAST AND YOU DIDN'T HAVE MONEY TO GET MORE.: NEVER TRUE

## 2023-04-27 ASSESSMENT — PATIENT HEALTH QUESTIONNAIRE - PHQ9
SUM OF ALL RESPONSES TO PHQ QUESTIONS 1-9: 0
SUM OF ALL RESPONSES TO PHQ QUESTIONS 1-9: 0
1. LITTLE INTEREST OR PLEASURE IN DOING THINGS: 0
SUM OF ALL RESPONSES TO PHQ QUESTIONS 1-9: 0
SUM OF ALL RESPONSES TO PHQ QUESTIONS 1-9: 0
1. LITTLE INTEREST OR PLEASURE IN DOING THINGS: 0
SUM OF ALL RESPONSES TO PHQ QUESTIONS 1-9: 0
2. FEELING DOWN, DEPRESSED OR HOPELESS: 0
SUM OF ALL RESPONSES TO PHQ9 QUESTIONS 1 & 2: 0
SUM OF ALL RESPONSES TO PHQ QUESTIONS 1-9: 0
2. FEELING DOWN, DEPRESSED OR HOPELESS: 0
SUM OF ALL RESPONSES TO PHQ9 QUESTIONS 1 & 2: 0

## 2023-04-27 ASSESSMENT — LIFESTYLE VARIABLES
HOW OFTEN DO YOU HAVE A DRINK CONTAINING ALCOHOL: 1
HOW OFTEN DO YOU HAVE SIX OR MORE DRINKS ON ONE OCCASION: 1
HOW MANY STANDARD DRINKS CONTAINING ALCOHOL DO YOU HAVE ON A TYPICAL DAY: 0
HOW MANY STANDARD DRINKS CONTAINING ALCOHOL DO YOU HAVE ON A TYPICAL DAY: PATIENT DOES NOT DRINK
HOW OFTEN DO YOU HAVE A DRINK CONTAINING ALCOHOL: NEVER

## 2023-04-30 ASSESSMENT — ENCOUNTER SYMPTOMS
SHORTNESS OF BREATH: 0
DIARRHEA: 0
CHEST TIGHTNESS: 0
RHINORRHEA: 0
COUGH: 0
EYES NEGATIVE: 1
BACK PAIN: 0
SORE THROAT: 0
NAUSEA: 0
ABDOMINAL PAIN: 0
VOMITING: 0

## 2023-05-01 RX ORDER — GABAPENTIN 300 MG/1
CAPSULE ORAL
Qty: 180 CAPSULE | Refills: 1 | Status: SHIPPED | OUTPATIENT
Start: 2023-05-01 | End: 2023-10-28

## 2023-05-11 ENCOUNTER — OFFICE VISIT (OUTPATIENT)
Dept: FAMILY MEDICINE CLINIC | Age: 78
End: 2023-05-11

## 2023-05-11 VITALS
BODY MASS INDEX: 25.02 KG/M2 | WEIGHT: 124.1 LBS | HEIGHT: 59 IN | DIASTOLIC BLOOD PRESSURE: 72 MMHG | RESPIRATION RATE: 14 BRPM | SYSTOLIC BLOOD PRESSURE: 130 MMHG | TEMPERATURE: 97.6 F

## 2023-05-11 DIAGNOSIS — Z00.00 MEDICARE ANNUAL WELLNESS VISIT, SUBSEQUENT: Primary | ICD-10-CM

## 2023-05-11 DIAGNOSIS — I10 PRIMARY HYPERTENSION: ICD-10-CM

## 2023-05-11 DIAGNOSIS — E78.5 HYPERLIPIDEMIA, UNSPECIFIED HYPERLIPIDEMIA TYPE: ICD-10-CM

## 2023-05-11 DIAGNOSIS — Z00.00 ROUTINE GENERAL MEDICAL EXAMINATION AT A HEALTH CARE FACILITY: ICD-10-CM

## 2023-05-11 NOTE — PATIENT INSTRUCTIONS
a vitamin D supplement is usually necessary to meet this goal.  When exposed to the sun, use a sunscreen that protects against both UVA and UVB radiation with an SPF of 30 or greater. Reapply every 2 to 3 hours or after sweating, drying off with a towel, or swimming. Always wear a seat belt when traveling in a car. Always wear a helmet when riding a bicycle or motorcycle.

## 2023-05-15 LAB
ALBUMIN SERPL-MCNC: 5.1 G/DL (ref 3.5–5.2)
ALK PHOSPHATASE: 83 U/L (ref 40–142)
ALT SERPL-CCNC: 29 U/L (ref 5–40)
ANION GAP SERPL CALCULATED.3IONS-SCNC: 9 MEQ/L (ref 7–16)
AST SERPL-CCNC: 32 U/L (ref 9–40)
BILIRUB SERPL-MCNC: 0.5 MG/DL
BUN BLDV-MCNC: 19 MG/DL (ref 8–23)
CALCIUM SERPL-MCNC: 10.7 MG/DL (ref 8.5–10.5)
CHLORIDE BLD-SCNC: 103 MEQ/L (ref 95–107)
CHOLESTEROL/HDL RATIO: 3 RATIO
CHOLESTEROL: 186 MG/DL
CO2: 27 MEQ/L (ref 19–31)
CREAT SERPL-MCNC: 1.39 MG/DL (ref 0.6–1.3)
EGFR IF NONAFRICAN AMERICAN: 39 ML/MIN/1.73
GLUCOSE: 94 MG/DL (ref 70–99)
HDLC SERPL-MCNC: 63 MG/DL
LDL CHOLESTEROL CALCULATED: 98 MG/DL
LDL/HDL RATIO: 1.6 RATIO
POTASSIUM SERPL-SCNC: 5.5 MEQ/L (ref 3.5–5.4)
SODIUM BLD-SCNC: 139 MEQ/L (ref 133–146)
TOTAL PROTEIN: 7.5 G/DL (ref 6.1–8.3)
TRIGL SERPL-MCNC: 123 MG/DL
VLDLC SERPL CALC-MCNC: 25 MG/DL

## 2023-05-26 DIAGNOSIS — Z78.0 OSTEOPENIA AFTER MENOPAUSE: ICD-10-CM

## 2023-05-26 DIAGNOSIS — M85.80 OSTEOPENIA AFTER MENOPAUSE: ICD-10-CM

## 2023-05-26 RX ORDER — IBANDRONATE SODIUM 150 MG/1
TABLET, FILM COATED ORAL
Qty: 3 TABLET | Refills: 3 | Status: SHIPPED | OUTPATIENT
Start: 2023-05-26

## 2023-06-19 RX ORDER — ATORVASTATIN CALCIUM 10 MG/1
TABLET, FILM COATED ORAL
Qty: 90 TABLET | Refills: 3 | Status: SHIPPED | OUTPATIENT
Start: 2023-06-19

## 2023-06-19 RX ORDER — LOSARTAN POTASSIUM 25 MG/1
TABLET ORAL
Qty: 90 TABLET | Refills: 3 | Status: SHIPPED | OUTPATIENT
Start: 2023-06-19

## 2023-10-03 ENCOUNTER — HOSPITAL ENCOUNTER (OUTPATIENT)
Dept: ULTRASOUND IMAGING | Age: 78
Discharge: HOME | End: 2023-10-03
Payer: MEDICARE

## 2023-10-03 DIAGNOSIS — D17.9 ANGIOMYOLIPOMA: ICD-10-CM

## 2023-10-03 DIAGNOSIS — Z90.5 SOLITARY KIDNEY, ACQUIRED: ICD-10-CM

## 2023-10-03 PROCEDURE — 76770 US EXAM ABDO BACK WALL COMP: CPT

## 2023-10-09 RX ORDER — CYCLOBENZAPRINE HCL 10 MG
TABLET ORAL
Qty: 90 TABLET | Refills: 3 | Status: SHIPPED | OUTPATIENT
Start: 2023-10-09

## 2023-10-10 ENCOUNTER — OFFICE VISIT (OUTPATIENT)
Dept: UROLOGY | Age: 78
End: 2023-10-10
Payer: MEDICARE

## 2023-10-10 VITALS — WEIGHT: 124 LBS | HEIGHT: 59 IN | BODY MASS INDEX: 25 KG/M2 | RESPIRATION RATE: 16 BRPM

## 2023-10-10 DIAGNOSIS — Z90.5 SOLITARY KIDNEY, ACQUIRED: ICD-10-CM

## 2023-10-10 DIAGNOSIS — R39.14 FEELING OF INCOMPLETE BLADDER EMPTYING: ICD-10-CM

## 2023-10-10 DIAGNOSIS — D17.9 ANGIOMYOLIPOMA: Primary | ICD-10-CM

## 2023-10-10 PROCEDURE — 99213 OFFICE O/P EST LOW 20 MIN: CPT | Performed by: UROLOGY

## 2023-10-10 PROCEDURE — G8427 DOCREV CUR MEDS BY ELIG CLIN: HCPCS | Performed by: UROLOGY

## 2023-10-10 PROCEDURE — G8484 FLU IMMUNIZE NO ADMIN: HCPCS | Performed by: UROLOGY

## 2023-10-10 PROCEDURE — 1036F TOBACCO NON-USER: CPT | Performed by: UROLOGY

## 2023-10-10 PROCEDURE — 1123F ACP DISCUSS/DSCN MKR DOCD: CPT | Performed by: UROLOGY

## 2023-10-10 PROCEDURE — G8417 CALC BMI ABV UP PARAM F/U: HCPCS | Performed by: UROLOGY

## 2023-10-10 PROCEDURE — 1090F PRES/ABSN URINE INCON ASSESS: CPT | Performed by: UROLOGY

## 2023-10-10 PROCEDURE — G8399 PT W/DXA RESULTS DOCUMENT: HCPCS | Performed by: UROLOGY

## 2023-10-10 NOTE — PROGRESS NOTES
Zeeshan Ansari MD.    [address]  [address] [address]  Dept: [phone]  Dept Fax: [phone]  Loc: [facility] Urology Office Note -     Patient:  Zenon Lucas  YOB: 1945    The patient is a 70 y.o. female who presents today for evaluation of the following problems:   Chief Complaint   Patient presents with    Follow-up    Results     Renal us     referred/consultation requested by Gudelia Temple MD.    History of Present Illness:    AML  Had nephrectomy on right kidney in 80's with Dr Kiki Ibanez for very large AML   Here with growth of small aml's on solitary left kidney  Largest 1.2 cm  Pt is worried about poss dialysis  Sees dr Dulce Ortega      Requested/reviewed records from Gudelia Temple MD office and/or outside physician/EMR    (Patient's old records have been requested, reviewed and pertinent findings summarized in today's note.)    Procedures Today: N/A      Last several PSA's:  No results found for: \"PSA\"    Last total testosterone:  No results found for: \"TESTOSTERONE\"    Urinalysis today:  No results found for this visit on 10/10/23. Last BUN and creatinine:  Lab Results   Component Value Date    BUN 19 05/15/2023     Lab Results   Component Value Date    CREATININE 1.39 (H) 05/15/2023         Imaging Reviewed during this Office Visit:   Chaz Gregorio MD independently reviewed the images and verified the radiology reports from:    imaging independently reviewed by Chaz Gregorio MD and radiology report verified demonstrating     US RENAL COMPLETE    Result Date: 10/3/2023  PROCEDURE: US RENAL COMPLETE CLINICAL INFORMATION: Angiomyolipoma, prior right nephrectomy TECHNIQUE: Ultrasound of the kidneys and urinary bladder was performed. Grayscale and color images were obtained. COMPARISON: Renal ultrasound 10/4/2022 FINDINGS: The right kidney is surgically absent.  The left kidney measures

## 2023-10-17 ENCOUNTER — TELEPHONE (OUTPATIENT)
Dept: UROLOGY | Age: 78
End: 2023-10-17

## 2023-10-23 RX ORDER — GABAPENTIN 300 MG/1
CAPSULE ORAL
Qty: 180 CAPSULE | Refills: 1 | Status: SHIPPED | OUTPATIENT
Start: 2023-10-23 | End: 2024-04-23

## 2023-11-30 ENCOUNTER — OFFICE VISIT (OUTPATIENT)
Dept: FAMILY MEDICINE CLINIC | Age: 78
End: 2023-11-30
Payer: MEDICARE

## 2023-11-30 VITALS
BODY MASS INDEX: 24.65 KG/M2 | HEART RATE: 104 BPM | WEIGHT: 121 LBS | OXYGEN SATURATION: 96 % | DIASTOLIC BLOOD PRESSURE: 80 MMHG | TEMPERATURE: 97.7 F | RESPIRATION RATE: 16 BRPM | SYSTOLIC BLOOD PRESSURE: 130 MMHG

## 2023-11-30 DIAGNOSIS — L98.9 LEG LESION: Primary | ICD-10-CM

## 2023-11-30 PROCEDURE — G8399 PT W/DXA RESULTS DOCUMENT: HCPCS | Performed by: FAMILY MEDICINE

## 2023-11-30 PROCEDURE — 1090F PRES/ABSN URINE INCON ASSESS: CPT | Performed by: FAMILY MEDICINE

## 2023-11-30 PROCEDURE — 99213 OFFICE O/P EST LOW 20 MIN: CPT | Performed by: FAMILY MEDICINE

## 2023-11-30 PROCEDURE — 3079F DIAST BP 80-89 MM HG: CPT | Performed by: FAMILY MEDICINE

## 2023-11-30 PROCEDURE — 1123F ACP DISCUSS/DSCN MKR DOCD: CPT | Performed by: FAMILY MEDICINE

## 2023-11-30 PROCEDURE — 1036F TOBACCO NON-USER: CPT | Performed by: FAMILY MEDICINE

## 2023-11-30 PROCEDURE — G8427 DOCREV CUR MEDS BY ELIG CLIN: HCPCS | Performed by: FAMILY MEDICINE

## 2023-11-30 PROCEDURE — 3075F SYST BP GE 130 - 139MM HG: CPT | Performed by: FAMILY MEDICINE

## 2023-11-30 PROCEDURE — G8420 CALC BMI NORM PARAMETERS: HCPCS | Performed by: FAMILY MEDICINE

## 2023-11-30 PROCEDURE — G8484 FLU IMMUNIZE NO ADMIN: HCPCS | Performed by: FAMILY MEDICINE

## 2023-11-30 RX ORDER — AMOXICILLIN 500 MG/1
500 CAPSULE ORAL AS NEEDED
COMMUNITY

## 2023-11-30 ASSESSMENT — ENCOUNTER SYMPTOMS
DIARRHEA: 0
RHINORRHEA: 0
COUGH: 0
NAUSEA: 0
BACK PAIN: 0
SORE THROAT: 0
SHORTNESS OF BREATH: 0
EYES NEGATIVE: 1
CHEST TIGHTNESS: 0
VOMITING: 0
ABDOMINAL PAIN: 0

## 2023-11-30 NOTE — PROGRESS NOTES
[address] MEDICINE  [address] [place] [address]  Dept: [phone]  Dept Fax: [phone]  Loc: [phone]  PROGRESS NOTE      VisitDate: 11/30/2023    Lucio Montilla is a 70 y.o. female who presents today for:  Chief Complaint   Patient presents with    Skin Problem     Right chin has a spot she would like looked at- its sore to the touch, she noticed in October. Impression/Plan:  1. Leg lesion      Requested Prescriptions      No prescriptions requested or ordered in this encounter     Orders Placed This Encounter   Procedures    External Referral To Dermatology     Referral Priority:   Routine     Referral Type:   Eval and Treat     Referral Reason:   Specialty Services Required     Requested Specialty:   Dermatology     Number of Visits Requested:   1   Will send another referral up to her dermatologist with a copy of this progress note in hopes that we can get her appointment moved up      Subjective:  HPI  Patient comes in to have leg lesion checked. She noticed it last month and on the right anterior shin and has noted that it has been steadily increasing in size, has become more tender and red. She reports history of multiple precancerous and cancerous lesions removed by her dermatologist in the past.    Review of Systems   Constitutional:  Negative for activity change, appetite change, fatigue and fever. HENT:  Negative for congestion, rhinorrhea and sore throat. Eyes: Negative. Respiratory:  Negative for cough, chest tightness and shortness of breath. Cardiovascular:  Negative for chest pain and palpitations. Gastrointestinal:  Negative for abdominal pain, diarrhea, nausea and vomiting. Genitourinary:  Negative for dysuria and urgency. Musculoskeletal:  Negative for arthralgias and back pain. Skin:  Positive for wound. Neurological:  Negative for dizziness and headaches.    Psychiatric/Behavioral:  Negative

## 2024-01-22 ENCOUNTER — TELEPHONE (OUTPATIENT)
Dept: FAMILY MEDICINE CLINIC | Age: 79
End: 2024-01-22

## 2024-01-22 NOTE — TELEPHONE ENCOUNTER
Lea Medicare denied Flexeril. I will be appealing this denial. She uses this for spasms and and acute musculoskeletal conditions. Risks and benefits have been discussed with her as she is over 65.     Letter of standard appeal is faxed to [phone]

## 2024-02-02 LAB
ANION GAP SERPL CALCULATED.3IONS-SCNC: 12 MEQ/L (ref 7–16)
BUN BLDV-MCNC: 20 MG/DL (ref 8–23)
CALCIUM SERPL-MCNC: 10.5 MG/DL (ref 8.5–10.5)
CHLORIDE BLD-SCNC: 100 MEQ/L (ref 95–107)
CO2: 28 MEQ/L (ref 19–31)
CREAT SERPL-MCNC: 1.44 MG/DL (ref 0.6–1.3)
EGFR IF NONAFRICAN AMERICAN: 38 ML/MIN/1.73
GLUCOSE: 85 MG/DL (ref 70–99)
POTASSIUM SERPL-SCNC: 4.3 MEQ/L (ref 3.5–5.4)
SODIUM BLD-SCNC: 140 MEQ/L (ref 133–146)

## 2024-02-08 ENCOUNTER — OFFICE VISIT (OUTPATIENT)
Dept: NEPHROLOGY | Age: 79
End: 2024-02-08
Payer: MEDICARE

## 2024-02-08 VITALS
OXYGEN SATURATION: 98 % | WEIGHT: 118 LBS | HEIGHT: 57 IN | HEART RATE: 108 BPM | DIASTOLIC BLOOD PRESSURE: 84 MMHG | SYSTOLIC BLOOD PRESSURE: 124 MMHG | BODY MASS INDEX: 25.46 KG/M2

## 2024-02-08 DIAGNOSIS — I12.9 HYPERTENSIVE RENAL DISEASE, STAGE 1 THROUGH STAGE 4 OR UNSPECIFIED CHRONIC KIDNEY DISEASE: ICD-10-CM

## 2024-02-08 DIAGNOSIS — D17.71 ANGIOMYOLIPOMA OF KIDNEY: ICD-10-CM

## 2024-02-08 DIAGNOSIS — N18.32 STAGE 3B CHRONIC KIDNEY DISEASE (HCC): Primary | ICD-10-CM

## 2024-02-08 DIAGNOSIS — Z90.5 SOLITARY KIDNEY, ACQUIRED: ICD-10-CM

## 2024-02-08 PROCEDURE — G8427 DOCREV CUR MEDS BY ELIG CLIN: HCPCS | Performed by: INTERNAL MEDICINE

## 2024-02-08 PROCEDURE — 1090F PRES/ABSN URINE INCON ASSESS: CPT | Performed by: INTERNAL MEDICINE

## 2024-02-08 PROCEDURE — 3079F DIAST BP 80-89 MM HG: CPT | Performed by: INTERNAL MEDICINE

## 2024-02-08 PROCEDURE — G8484 FLU IMMUNIZE NO ADMIN: HCPCS | Performed by: INTERNAL MEDICINE

## 2024-02-08 PROCEDURE — 1123F ACP DISCUSS/DSCN MKR DOCD: CPT | Performed by: INTERNAL MEDICINE

## 2024-02-08 PROCEDURE — 3074F SYST BP LT 130 MM HG: CPT | Performed by: INTERNAL MEDICINE

## 2024-02-08 PROCEDURE — G8417 CALC BMI ABV UP PARAM F/U: HCPCS | Performed by: INTERNAL MEDICINE

## 2024-02-08 PROCEDURE — 1036F TOBACCO NON-USER: CPT | Performed by: INTERNAL MEDICINE

## 2024-02-08 PROCEDURE — G8399 PT W/DXA RESULTS DOCUMENT: HCPCS | Performed by: INTERNAL MEDICINE

## 2024-02-08 PROCEDURE — 99213 OFFICE O/P EST LOW 20 MIN: CPT | Performed by: INTERNAL MEDICINE

## 2024-02-08 NOTE — PROGRESS NOTES
[facility] [place] [facility]  [facility] AND [facility]YP[facility]  [address]  [address]  [facility] [facility] [address]  Dept: [phone]  Loc: [phone]  Office Progress Note  2024 10:27 AM      Pt Name:    Disha Stack  YOB: 1945  Primary Care Physician:  Kush Galeana MD     Chief Complaint:   Chief Complaint   Patient presents with    Follow-up     CKD III        Background Information/Interval History:   77 yo white female with hx angiomyolipoma s/p Right nephrectomy (), CKD III, hx breast cancer s/p lumpectomy/Chemo/XRT left side (Dr. Ellis)  who is here for 12 months follow-up.  She is on cozaar. Daughter is a pharmacist.     Here for 12 months follow-up. She sees Dr. Ellis once a year. She has no urinary complaints.      Past History:  Past Medical History:   Diagnosis Date    Angiomyolipoma     left kidney    Arthritis     hands    Blood transfusion reaction     Cancer (HCC)     left breast    Cancer of skin     Chronic kidney disease     Stage III    GERD (gastroesophageal reflux disease)     Hyperlipidemia     Hypertension     Osteopenia     Osteoporosis     Prolonged emergence from general anesthesia      Past Surgical History:   Procedure Laterality Date    APPENDECTOMY  1971    BACK INJECTION Left 2021    left sacroiliac joint injection performed by Teofilo Rosa DO at [facility]    BREAST LUMPECTOMY  2009    left    BREAST SURGERY  70's    left biopsy    BREAST SURGERY  2009    Lumpectomy of the Left breast    CATARACT REMOVAL       SECTION  1971 and 1973    CHOLECYSTECTOMY  2006    COLONOSCOPY  2004    [facility] INJECTION PROCEDURE FOR SACROILIAC JOINT Left 2020    left SI joint injection performed by Teofilo Rosa DO at [facility] OR    HEMORRHOID SURGERY  2004    HIP SURGERY Left 2021    left intra-articular hip injection performed by Teofilo Rosa DO at [facility]    HYSTERECTOMY

## 2024-02-16 ENCOUNTER — HOSPITAL ENCOUNTER (EMERGENCY)
Age: 79
Discharge: HOME | End: 2024-02-16
Payer: MEDICARE

## 2024-02-16 VITALS
OXYGEN SATURATION: 100 % | RESPIRATION RATE: 18 BRPM | SYSTOLIC BLOOD PRESSURE: 135 MMHG | BODY MASS INDEX: 24.35 KG/M2 | DIASTOLIC BLOOD PRESSURE: 85 MMHG | HEART RATE: 118 BPM | WEIGHT: 116 LBS | TEMPERATURE: 97 F | HEIGHT: 58 IN

## 2024-02-16 DIAGNOSIS — L03.116 CELLULITIS OF LEFT LOWER LEG: Primary | ICD-10-CM

## 2024-02-16 PROCEDURE — 99213 OFFICE O/P EST LOW 20 MIN: CPT

## 2024-02-16 RX ORDER — SULFAMETHOXAZOLE AND TRIMETHOPRIM 800; 160 MG/1; MG/1
1 TABLET ORAL 2 TIMES DAILY
Qty: 14 TABLET | Refills: 0 | Status: SHIPPED | OUTPATIENT
Start: 2024-02-16 | End: 2024-02-23

## 2024-02-16 ASSESSMENT — PAIN DESCRIPTION - PAIN TYPE: TYPE: ACUTE PAIN

## 2024-02-16 ASSESSMENT — PAIN DESCRIPTION - FREQUENCY: FREQUENCY: CONTINUOUS

## 2024-02-16 ASSESSMENT — PAIN DESCRIPTION - ONSET: ONSET: GRADUAL

## 2024-02-16 ASSESSMENT — PAIN DESCRIPTION - LOCATION: LOCATION: LEG

## 2024-02-16 ASSESSMENT — PAIN DESCRIPTION - ORIENTATION: ORIENTATION: LEFT;LOWER

## 2024-02-16 ASSESSMENT — PAIN - FUNCTIONAL ASSESSMENT: PAIN_FUNCTIONAL_ASSESSMENT: 0-10

## 2024-02-16 ASSESSMENT — PAIN SCALES - GENERAL: PAINLEVEL_OUTOF10: 4

## 2024-02-16 ASSESSMENT — PAIN DESCRIPTION - DESCRIPTORS: DESCRIPTORS: SORE;TENDER

## 2024-02-16 NOTE — ED PROVIDER NOTES
Influenza Virus Vaccine 10/01/2014    Influenza, FLUAD, (age 65 y+), Adjuvanted, 0.5mL 10/02/2020    Influenza, FLUARIX, FLULAVAL, FLUZONE (age 6 mo+) AND AFLURIA, (age 3 y+), PF, 0.5mL 10/11/2017    Influenza, FLUZONE (age 65 y+), High Dose, 0.7mL 10/15/2021, 10/13/2022, 10/10/2023    Influenza, High Dose (Fluzone 65 yrs and older) 11/03/2015, 10/02/2018, 09/23/2019    Influenza, Triv, inactivated, subunit, adjuvanted, IM (Fluad 65 yrs and older) 10/02/2020    Pneumococcal, PCV-13, PREVNAR 13, (age 6w+), IM, 0.5mL 12/13/2013    Pneumococcal, PCV20, PREVNAR 20, (age 6w+), IM, 0.5mL 11/04/2022    Pneumococcal, PPSV23, PNEUMOVAX 23, (age 2y+), SC/IM, 0.5mL 11/01/2015    TDaP, ADACEL (age 10y-64y), BOOSTRIX (age 10y+), IM, 0.5mL 10/27/2022    Td, unspecified formulation 01/01/1997    Zoster Live (Zostavax) 04/17/2015    Zoster Recombinant (Shingrix) 10/18/2022, 12/13/2022       FAMILY HISTORY     Patient's family history includes Arthritis in her maternal grandmother and paternal grandmother; Cancer in her mother and paternal aunt; Hearing Loss in her father and mother; Heart Failure in her father; High Blood Pressure in her mother; High Cholesterol in her mother; Hypertension in her mother; Stroke in her mother.    SOCIAL HISTORY     Patient  reports that she has never smoked. She has never used smokeless tobacco. She reports that she does not drink alcohol and does not use drugs.    PHYSICAL EXAM     ED TRIAGE VITALS  BP: 135/85, Temp: 97 °F (36.1 °C), Pulse: (!) 118, Respirations: 18, SpO2: 100 %,Estimated body mass index is 24.67 kg/m² as calculated from the following:    Height as of this encounter: 1.461 m (4' 9.5\").    Weight as of this encounter: 52.6 kg (116 lb).,No LMP recorded. Patient has had a hysterectomy.    Physical Exam  Vitals and nursing note reviewed.   Constitutional:       Appearance: Normal appearance. She is normal weight.   Cardiovascular:      Rate and Rhythm: Normal rate and regular  rhythm.      Heart sounds: Normal heart sounds.   Pulmonary:      Effort: Pulmonary effort is normal.      Breath sounds: Normal breath sounds.   Skin:     General: Skin is warm and dry.      Capillary Refill: Capillary refill takes less than 2 seconds.      Findings: Erythema present.             Comments: Erythema, swelling, warmth, and tenderness.   Neurological:      General: No focal deficit present.      Mental Status: She is alert and oriented to person, place, and time.         DIAGNOSTIC RESULTS     Labs:No results found for this visit on 02/16/24.    IMAGING:    No orders to display         EKG:      URGENT CARE COURSE:     Vitals:    02/16/24 1341   BP: 135/85   Pulse: (!) 118   Resp: 18   Temp: 97 °F (36.1 °C)   TempSrc: Temporal   SpO2: 100%   Weight: 52.6 kg (116 lb)   Height: 1.461 m (4' 9.5\")       Medications - No data to display         PROCEDURES:  None    FINAL IMPRESSION      1. Cellulitis of left lower leg          DISPOSITION/ PLAN   Patient diagnosed with cellulitis of left lower leg.  Patient educated to take antibiotics as prescribed.  Patient educated to return to emergency services for chest pain, shortness of breath, fever, chills, or worsening swelling.  Patient educated to follow-up with PCP next week for recheck.        PATIENT REFERRED TO:  Kush Galeana MD  [address]  / [facility] [address]      DISCHARGE MEDICATIONS:  New Prescriptions    SULFAMETHOXAZOLE-TRIMETHOPRIM (BACTRIM DS) 800-160 MG PER TABLET    Take 1 tablet by mouth 2 times daily for 7 days       Discontinued Medications    No medications on file       Current Discharge Medication List          ÓSCAR Tejeda CNP    (Please note that portions of this note were completed with a voice recognition program. Efforts were made to edit the dictations but occasionally words are mis-transcribed.)            Melanie Currie APRN - CNP  02/16/24 0248

## 2024-02-18 ENCOUNTER — PATIENT MESSAGE (OUTPATIENT)
Dept: FAMILY MEDICINE CLINIC | Age: 79
End: 2024-02-18

## 2024-02-19 NOTE — TELEPHONE ENCOUNTER
From: Disha Stack  To: Dr. Kush Galeana  Sent: 2/18/2024 2:04 PM EST  Subject: Visit to Urgent Care    Dr. Galeana,  I visited the Urgent Care on Friday because my left leg was red and swollen. It was determined the I have cellulitis and Bactrim 160 mg twice daily for 7 days was prescribed. I get dizzy and exhausted after I take this medication. My daughter, Anna, suggested that I only take this once a day since I have those side effects and only 1 kidney. She told me I should let you know right away with an email. Urgent Care said I should see you sometime this week as well.

## 2024-02-23 ENCOUNTER — OFFICE VISIT (OUTPATIENT)
Dept: FAMILY MEDICINE CLINIC | Age: 79
End: 2024-02-23

## 2024-02-23 VITALS
TEMPERATURE: 98.3 F | DIASTOLIC BLOOD PRESSURE: 74 MMHG | OXYGEN SATURATION: 99 % | WEIGHT: 119.2 LBS | SYSTOLIC BLOOD PRESSURE: 118 MMHG | HEART RATE: 123 BPM | BODY MASS INDEX: 25.35 KG/M2

## 2024-02-23 DIAGNOSIS — L03.90 CELLULITIS, UNSPECIFIED CELLULITIS SITE: Primary | ICD-10-CM

## 2024-02-23 RX ORDER — CEPHALEXIN 500 MG/1
500 CAPSULE ORAL 3 TIMES DAILY
Qty: 21 CAPSULE | Refills: 0 | Status: SHIPPED | OUTPATIENT
Start: 2024-02-23 | End: 2024-03-01

## 2024-02-23 ASSESSMENT — PATIENT HEALTH QUESTIONNAIRE - PHQ9: DEPRESSION UNABLE TO ASSESS: URGENT/EMERGENT SITUATION

## 2024-04-19 RX ORDER — GABAPENTIN 300 MG/1
CAPSULE ORAL
Qty: 180 CAPSULE | Refills: 1 | Status: SHIPPED | OUTPATIENT
Start: 2024-04-19 | End: 2024-10-19

## 2024-05-08 DIAGNOSIS — M85.80 OSTEOPENIA AFTER MENOPAUSE: ICD-10-CM

## 2024-05-08 DIAGNOSIS — Z78.0 OSTEOPENIA AFTER MENOPAUSE: ICD-10-CM

## 2024-05-08 RX ORDER — IBANDRONATE SODIUM 150 MG/1
TABLET, FILM COATED ORAL
Qty: 3 TABLET | Refills: 3 | Status: SHIPPED | OUTPATIENT
Start: 2024-05-08

## 2024-05-14 SDOH — ECONOMIC STABILITY: INCOME INSECURITY: HOW HARD IS IT FOR YOU TO PAY FOR THE VERY BASICS LIKE FOOD, HOUSING, MEDICAL CARE, AND HEATING?: NOT HARD AT ALL

## 2024-05-14 SDOH — ECONOMIC STABILITY: FOOD INSECURITY: WITHIN THE PAST 12 MONTHS, THE FOOD YOU BOUGHT JUST DIDN'T LAST AND YOU DIDN'T HAVE MONEY TO GET MORE.: NEVER TRUE

## 2024-05-14 SDOH — ECONOMIC STABILITY: FOOD INSECURITY: WITHIN THE PAST 12 MONTHS, YOU WORRIED THAT YOUR FOOD WOULD RUN OUT BEFORE YOU GOT MONEY TO BUY MORE.: NEVER TRUE

## 2024-05-14 SDOH — HEALTH STABILITY: PHYSICAL HEALTH: ON AVERAGE, HOW MANY MINUTES DO YOU ENGAGE IN EXERCISE AT THIS LEVEL?: 0 MIN

## 2024-05-14 SDOH — ECONOMIC STABILITY: TRANSPORTATION INSECURITY
IN THE PAST 12 MONTHS, HAS LACK OF TRANSPORTATION KEPT YOU FROM MEETINGS, WORK, OR FROM GETTING THINGS NEEDED FOR DAILY LIVING?: NO

## 2024-05-14 ASSESSMENT — PATIENT HEALTH QUESTIONNAIRE - PHQ9
SUM OF ALL RESPONSES TO PHQ QUESTIONS 1-9: 0
1. LITTLE INTEREST OR PLEASURE IN DOING THINGS: NOT AT ALL
SUM OF ALL RESPONSES TO PHQ9 QUESTIONS 1 & 2: 0
SUM OF ALL RESPONSES TO PHQ QUESTIONS 1-9: 0
SUM OF ALL RESPONSES TO PHQ QUESTIONS 1-9: 0
2. FEELING DOWN, DEPRESSED OR HOPELESS: NOT AT ALL
SUM OF ALL RESPONSES TO PHQ QUESTIONS 1-9: 0

## 2024-05-14 ASSESSMENT — LIFESTYLE VARIABLES
HOW OFTEN DO YOU HAVE SIX OR MORE DRINKS ON ONE OCCASION: 1
HOW MANY STANDARD DRINKS CONTAINING ALCOHOL DO YOU HAVE ON A TYPICAL DAY: PATIENT DOES NOT DRINK
HOW MANY STANDARD DRINKS CONTAINING ALCOHOL DO YOU HAVE ON A TYPICAL DAY: 0
HOW OFTEN DO YOU HAVE A DRINK CONTAINING ALCOHOL: NEVER
HOW OFTEN DO YOU HAVE A DRINK CONTAINING ALCOHOL: 1

## 2024-05-17 ENCOUNTER — OFFICE VISIT (OUTPATIENT)
Dept: FAMILY MEDICINE CLINIC | Age: 79
End: 2024-05-17

## 2024-05-17 VITALS
SYSTOLIC BLOOD PRESSURE: 114 MMHG | BODY MASS INDEX: 24.92 KG/M2 | RESPIRATION RATE: 16 BRPM | WEIGHT: 118.7 LBS | HEIGHT: 58 IN | DIASTOLIC BLOOD PRESSURE: 70 MMHG | HEART RATE: 80 BPM | OXYGEN SATURATION: 97 % | TEMPERATURE: 98.7 F

## 2024-05-17 DIAGNOSIS — E78.5 HYPERLIPIDEMIA, UNSPECIFIED HYPERLIPIDEMIA TYPE: ICD-10-CM

## 2024-05-17 DIAGNOSIS — Z00.00 MEDICARE ANNUAL WELLNESS VISIT, SUBSEQUENT: Primary | ICD-10-CM

## 2024-05-17 DIAGNOSIS — I10 PRIMARY HYPERTENSION: ICD-10-CM

## 2024-05-17 DIAGNOSIS — M46.1 SACROILIITIS (HCC): ICD-10-CM

## 2024-05-17 NOTE — PATIENT INSTRUCTIONS
sports drinks.  Where can you learn more?  Go to https://www.[employer].net/patientEd and enter T756 to learn more about \"Eating Healthy Foods: Care Instructions.\"  Current as of: September 20, 2023               Content Version: 14.0  © 2006-2024 [employer].   Care instructions adapted under license by [employer]. If you have questions about a medical condition or this instruction, always ask your healthcare professional. [employer] disclaims any warranty or liability for your use of this information.           A Healthy Heart: Care Instructions  Overview     Coronary artery disease, also called heart disease, occurs when a substance called plaque builds up in the vessels that supply oxygen-rich blood to your heart muscle. This can narrow the blood vessels and reduce blood flow. A heart attack happens when blood flow is completely blocked. A high-fat diet, smoking, and other factors increase the risk of heart disease.  Your doctor has found that you have a chance of having heart disease. A heart-healthy lifestyle can help keep your heart healthy and prevent heart disease. This lifestyle includes eating healthy, being active, staying at a weight that's healthy for you, and not smoking or using tobacco. It also includes taking medicines as directed, managing other health conditions, and trying to get a healthy amount of sleep.  Follow-up care is a key part of your treatment and safety. Be sure to make and go to all appointments, and call your doctor if you are having problems. It's also a good idea to know your test results and keep a list of the medicines you take.  How can you care for yourself at home?  Diet    Use less salt when you cook and eat. This helps lower your blood pressure. Taste food before salting. Add only a little salt when you think you need it. With time, your taste buds will adjust to less salt.     Eat fewer snack items, fast foods, canned soups, and other

## 2024-05-17 NOTE — PROGRESS NOTES
Medicare Annual Wellness Visit    Disha Stack is here for Medicare AWV (No issues or concerns )    Assessment & Plan   Medicare annual wellness visit, subsequent  -     Lipid Panel; Future  -     Comprehensive Metabolic Panel; Future  -     CBC with Auto Differential; Future  Primary hypertension  -     Lipid Panel; Future  -     Comprehensive Metabolic Panel; Future  -     CBC with Auto Differential; Future  Hyperlipidemia, unspecified hyperlipidemia type  Sacroiliitis (HCC)    Recommendations for Preventive Services Due: see orders and patient instructions/AVS.  Recommended screening schedule for the next 5-10 years is provided to the patient in written form: see Patient Instructions/AVS.     No follow-ups on file.     Subjective   The following acute and/or chronic problems were also addressed today:   Diagnosis Orders   1. Medicare annual wellness visit, subsequent  Lipid Panel    Comprehensive Metabolic Panel    CBC with Auto Differential      2. Primary hypertension  Lipid Panel    Comprehensive Metabolic Panel    CBC with Auto Differential      3. Hyperlipidemia, unspecified hyperlipidemia type        4. Sacroiliitis (HCC)              Patient's complete Health Risk Assessment and screening values have been reviewed and are found in Flowsheets. The following problems were reviewed today and where indicated follow up appointments were made and/or referrals ordered.    Positive Risk Factor Screenings with Interventions:                Activity, Diet, and Weight:  On average, how many days per week do you engage in moderate to strenuous exercise (like a brisk walk)?: 0 days  On average, how many minutes do you engage in exercise at this level?: 0 min    Do you eat balanced/healthy meals regularly?: (!) No    Body mass index is 25.24 kg/m².        Inactivity Interventions:  See A/P for plan and any pertinent orders  Do you eat balanced/healthy meals regularly Interventions:  See A/P for plan and any pertinent

## 2024-05-21 LAB
ALBUMIN: 4.6 G/DL (ref 3.5–5.2)
ALK PHOSPHATASE: 95 U/L (ref 40–142)
ALT SERPL-CCNC: 36 U/L (ref 5–40)
ANION GAP SERPL CALCULATED.3IONS-SCNC: 12 MEQ/L (ref 7–16)
AST SERPL-CCNC: 35 U/L (ref 9–40)
BILIRUB SERPL-MCNC: 0.4 MG/DL
BUN BLDV-MCNC: 16 MG/DL (ref 8–23)
CALCIUM SERPL-MCNC: 10.1 MG/DL (ref 8.5–10.5)
CHLORIDE BLD-SCNC: 103 MEQ/L (ref 95–107)
CHOLESTEROL, TOTAL: 151 MG/DL
CHOLESTEROL/HDL RATIO: 2.4 RATIO
CO2: 27 MEQ/L (ref 19–31)
CREAT SERPL-MCNC: 1.43 MG/DL (ref 0.6–1.3)
EGFR IF NONAFRICAN AMERICAN: 38 ML/MIN/1.73
GLUCOSE: 99 MG/DL (ref 70–99)
HDLC SERPL-MCNC: 64 MG/DL
LDL CHOLESTEROL: 63 MG/DL
LDL/HDL RATIO: 1 RATIO
POTASSIUM SERPL-SCNC: 5.3 MEQ/L (ref 3.5–5.4)
SODIUM BLD-SCNC: 142 MEQ/L (ref 133–146)
TOTAL PROTEIN: 6.9 G/DL (ref 6.1–8.3)
TRIGL SERPL-MCNC: 120 MG/DL
VLDLC SERPL CALC-MCNC: 24 MG/DL

## 2024-05-22 LAB
BASOPHILS ABSOLUTE: 0.08 K/UL (ref 0–0.2)
BASOPHILS RELATIVE PERCENT: 1.6 %
EOSINOPHILS ABSOLUTE: 0.45 K/UL (ref 0–0.5)
EOSINOPHILS RELATIVE PERCENT: 9 %
HCT VFR BLD CALC: 35.4 % (ref 34–45)
HEMOGLOBIN: 12.3 G/DL (ref 11.5–15.5)
IMMATURE GRANS (ABS): 0.01
IMMATURE GRANULOCYTES %: 0.2 %
LYMPHOCYTES ABSOLUTE: 1.7 K/UL (ref 1–4)
LYMPHOCYTES RELATIVE PERCENT: 34.1 %
MCH RBC QN AUTO: 30.5 PG (ref 25–33)
MCHC RBC AUTO-ENTMCNC: 34.7 G/DL (ref 31–36)
MCV RBC AUTO: 87.8 FL (ref 80–99)
MONOCYTES ABSOLUTE: 0.36 K/UL (ref 0.2–1)
MONOCYTES RELATIVE PERCENT: 7.2 %
NEUTROPHILS ABSOLUTE: 2.38 K/UL (ref 1.5–7.5)
NEUTROPHILS RELATIVE PERCENT: 47.9 %
PDW BLD-RTO: 12.8 % (ref 11.5–15)
PLATELET # BLD: 319 K/UL (ref 130–400)
PMV BLD AUTO: 8.7 FL (ref 9.3–13)
RBC # BLD: 4.03 M/UL (ref 3.8–5.4)
WBC # BLD: 5 K/UL (ref 3.5–11)

## 2024-05-28 ENCOUNTER — HOSPITAL ENCOUNTER (OUTPATIENT)
Dept: WOMENS IMAGING | Age: 79
Discharge: HOME | End: 2024-05-28
Attending: INTERNAL MEDICINE
Payer: MEDICARE

## 2024-05-28 VITALS — WEIGHT: 116.8 LBS | HEIGHT: 57 IN | BODY MASS INDEX: 25.2 KG/M2

## 2024-05-28 DIAGNOSIS — Z78.0 MENOPAUSE: ICD-10-CM

## 2024-05-28 PROCEDURE — 77080 DXA BONE DENSITY AXIAL: CPT

## 2024-06-12 RX ORDER — LOSARTAN POTASSIUM 25 MG/1
TABLET ORAL
Qty: 90 TABLET | Refills: 3 | Status: SHIPPED | OUTPATIENT
Start: 2024-06-12

## 2024-06-12 RX ORDER — ATORVASTATIN CALCIUM 10 MG/1
TABLET, FILM COATED ORAL
Qty: 90 TABLET | Refills: 3 | Status: SHIPPED | OUTPATIENT
Start: 2024-06-12

## 2024-08-12 ENCOUNTER — OFFICE VISIT (OUTPATIENT)
Dept: FAMILY MEDICINE CLINIC | Age: 79
End: 2024-08-12
Payer: MEDICARE

## 2024-08-12 VITALS
TEMPERATURE: 97.7 F | BODY MASS INDEX: 24.93 KG/M2 | OXYGEN SATURATION: 95 % | SYSTOLIC BLOOD PRESSURE: 118 MMHG | DIASTOLIC BLOOD PRESSURE: 64 MMHG | HEART RATE: 83 BPM | WEIGHT: 116 LBS | RESPIRATION RATE: 16 BRPM

## 2024-08-12 DIAGNOSIS — L03.116 CELLULITIS OF LEFT LEG WITHOUT FOOT: ICD-10-CM

## 2024-08-12 DIAGNOSIS — R25.2 MUSCLE CRAMPING: Primary | ICD-10-CM

## 2024-08-12 PROCEDURE — 1123F ACP DISCUSS/DSCN MKR DOCD: CPT | Performed by: NURSE PRACTITIONER

## 2024-08-12 PROCEDURE — 99214 OFFICE O/P EST MOD 30 MIN: CPT | Performed by: NURSE PRACTITIONER

## 2024-08-12 PROCEDURE — 3078F DIAST BP <80 MM HG: CPT | Performed by: NURSE PRACTITIONER

## 2024-08-12 PROCEDURE — 3074F SYST BP LT 130 MM HG: CPT | Performed by: NURSE PRACTITIONER

## 2024-08-12 PROCEDURE — G8427 DOCREV CUR MEDS BY ELIG CLIN: HCPCS | Performed by: NURSE PRACTITIONER

## 2024-08-12 PROCEDURE — 1036F TOBACCO NON-USER: CPT | Performed by: NURSE PRACTITIONER

## 2024-08-12 PROCEDURE — G8399 PT W/DXA RESULTS DOCUMENT: HCPCS | Performed by: NURSE PRACTITIONER

## 2024-08-12 PROCEDURE — G8420 CALC BMI NORM PARAMETERS: HCPCS | Performed by: NURSE PRACTITIONER

## 2024-08-12 PROCEDURE — 1090F PRES/ABSN URINE INCON ASSESS: CPT | Performed by: NURSE PRACTITIONER

## 2024-08-12 RX ORDER — CLINDAMYCIN HYDROCHLORIDE 300 MG/1
300 CAPSULE ORAL 3 TIMES DAILY
Qty: 30 CAPSULE | Refills: 1 | Status: SHIPPED | OUTPATIENT
Start: 2024-08-12 | End: 2024-09-01

## 2024-08-12 RX ORDER — CYCLOBENZAPRINE HCL 10 MG
TABLET ORAL
Qty: 90 TABLET | Refills: 3 | Status: SHIPPED | OUTPATIENT
Start: 2024-08-12

## 2024-08-12 ASSESSMENT — ENCOUNTER SYMPTOMS
EYE DISCHARGE: 0
EYE REDNESS: 0
ALLERGIC/IMMUNOLOGIC NEGATIVE: 1
SORE THROAT: 0
TROUBLE SWALLOWING: 0
SHORTNESS OF BREATH: 0
EYE PAIN: 0
CONSTIPATION: 0
BACK PAIN: 0
COUGH: 0
ABDOMINAL PAIN: 0
DIARRHEA: 0
VOMITING: 0
COLOR CHANGE: 1
RHINORRHEA: 0
NAUSEA: 0
WHEEZING: 0

## 2024-08-12 NOTE — PROGRESS NOTES
[facility] PROFESSIONAL SERVS  [facility]  [address] [id number]  Dept: [phone]  Dept Fax: [phone]  Loc: [phone]     Visit Date:  8/12/2024      Patient:  Disha Stack  YOB: 1945    HPI:     Chief Complaint   Patient presents with    Cellulitis     Redness and warmth to the LLE, swelling, began Thurs, no known injury, previous cellulitis in that foot earlier this year, denies fever but is super tired       Patient with several days of worsening cellulitis on the lower left leg with some occasional chilling, no fever, nausea vomiting or diarrhea.  Patient had this several months ago and it did not completely clear up.  Edema and pain is also noted.    Also requesting refill of her Flexeril for upper leg cramping which is chronic.        Medications    Current Outpatient Medications:     cyclobenzaprine (FLEXERIL) 10 MG tablet, Take 1 tablet by mouth at bedtime, Disp: 90 tablet, Rfl: 3    clindamycin (CLEOCIN) 300 MG capsule, Take 1 capsule by mouth 3 times daily for 20 days, Disp: 30 capsule, Rfl: 1    atorvastatin (LIPITOR) 10 MG tablet, TAKE 1 TABLET BY MOUTH EVERY DAY, Disp: 90 tablet, Rfl: 3    losartan (COZAAR) 25 MG tablet, TAKE 1 TABLET BY MOUTH EVERY DAY, Disp: 90 tablet, Rfl: 3    mupirocin (BACTROBAN) 2 % ointment, APPLY YO BIOPSY SITE ON LEG TWICE DAILY, Disp: , Rfl:     ibandronate (BONIVA) 150 MG tablet, TAKE 1 TABLET BY MOUTH EVERY 30 DAYS WITH A FULL GLASS OF WATER AND ON AN EMPTY STOMACH. DO NOT TAKE ANYTHING ELSE OR LIE DOWN FOR 60 MINUTES, Disp: 3 tablet, Rfl: 3    gabapentin (NEURONTIN) 300 MG capsule, TAKE 1 CAPSULE BY MOUTH TWICE DAILY, Disp: 180 capsule, Rfl: 1    Multiple Vitamins-Minerals (PRESERVISION AREDS 2 PO), Take by mouth, Disp: , Rfl:     Niacin (VITAMIN B-3 PO), Take by mouth, Disp: , Rfl:     Dextromethorphan-guaiFENesin (ROBITUSSIN DM PO), Take by mouth daily as needed, Disp: , Rfl:     ketotifen

## 2024-08-14 ENCOUNTER — PATIENT MESSAGE (OUTPATIENT)
Dept: FAMILY MEDICINE CLINIC | Age: 79
End: 2024-08-14

## 2024-08-14 DIAGNOSIS — M79.89 LEFT LEG SWELLING: Primary | ICD-10-CM

## 2024-08-15 ENCOUNTER — HOSPITAL ENCOUNTER (OUTPATIENT)
Dept: INTERVENTIONAL RADIOLOGY/VASCULAR | Age: 79
Discharge: HOME | End: 2024-08-17
Payer: MEDICARE

## 2024-08-15 DIAGNOSIS — M79.89 LEFT LEG SWELLING: ICD-10-CM

## 2024-08-15 PROCEDURE — 93971 EXTREMITY STUDY: CPT

## 2024-08-15 NOTE — TELEPHONE ENCOUNTER
Please schedule the patient for an outpatient ultrasound to rule out a DVT.  This can be done on a stat basis and hopefully can be completed no later than tomorrow.  Let the patient know that 3 days of antibiotics is not really a significant point to add more antibiotics.  But since there is pain and swelling that is persistent this may be more than just cellulitis.  After the ultrasound I will decide what to do from there.

## 2024-08-16 ENCOUNTER — NURSE ONLY (OUTPATIENT)
Dept: FAMILY MEDICINE CLINIC | Age: 79
End: 2024-08-16
Payer: MEDICARE

## 2024-08-16 ENCOUNTER — TELEPHONE (OUTPATIENT)
Dept: FAMILY MEDICINE CLINIC | Age: 79
End: 2024-08-16

## 2024-08-16 DIAGNOSIS — L03.116 CELLULITIS OF LEFT LEG WITHOUT FOOT: Primary | ICD-10-CM

## 2024-08-16 PROCEDURE — 96372 THER/PROPH/DIAG INJ SC/IM: CPT | Performed by: NURSE PRACTITIONER

## 2024-08-16 RX ORDER — CEFTRIAXONE 500 MG/1
500 INJECTION, POWDER, FOR SOLUTION INTRAMUSCULAR; INTRAVENOUS ONCE
Status: COMPLETED | OUTPATIENT
Start: 2024-08-16 | End: 2024-08-16

## 2024-08-16 RX ADMIN — CEFTRIAXONE 500 MG: 500 INJECTION, POWDER, FOR SOLUTION INTRAMUSCULAR; INTRAVENOUS at 11:12

## 2024-08-16 NOTE — PROGRESS NOTES
Administrations This Visit       cefTRIAXone (ROCEPHIN) injection 500 mg       Admin Date  08/16/2024  11:12 Action  Given Dose  500 mg Route  IntraMUSCular Site  Dorsogluteal Left Documented By  Dorene Perry CMA ([facility])    NDC: 5383-7880-50    Lot#: [id number]    : HOSPIRA    Patient Supplied?: No

## 2024-08-16 NOTE — TELEPHONE ENCOUNTER
----- Message from ÓSCAR Longoria CNP sent at 8/16/2024  9:31 AM EDT -----  Venous ultrasound is normal.  I gave a note to Nevin for the patient to come in on a nurse visit for Rocephin 500 mg IM injection to provide additional coverage for her leg cellulitis.

## 2024-08-20 ENCOUNTER — TELEPHONE (OUTPATIENT)
Dept: FAMILY MEDICINE CLINIC | Age: 79
End: 2024-08-20

## 2024-08-20 DIAGNOSIS — L03.116 CELLULITIS OF LEFT LEG WITHOUT FOOT: Primary | ICD-10-CM

## 2024-08-20 RX ORDER — CLINDAMYCIN HYDROCHLORIDE 300 MG/1
300 CAPSULE ORAL 3 TIMES DAILY
Qty: 30 CAPSULE | Refills: 0 | Status: SHIPPED | OUTPATIENT
Start: 2024-08-20 | End: 2024-08-30

## 2024-08-20 NOTE — TELEPHONE ENCOUNTER
Patient states that her symptoms are improved but not completely gone. Says she is 50% improved. Still somewhat red and warm with some swelling but much improved. She did get a Rocephin 500 mg on 8/16. Please advise.

## 2024-08-20 NOTE — TELEPHONE ENCOUNTER
Patient informed. Advised that once she is back from her trip, if symptoms are not resolved, she is to schedule a f/u with Dr Kervin Lares.

## 2024-08-20 NOTE — TELEPHONE ENCOUNTER
I guess I will send her additional antibiotics, but I am still concerned about this ongoing infection and other possible underlying issues.

## 2024-08-20 NOTE — TELEPHONE ENCOUNTER
I am not sure the patient is remembering the course of action we were going to be taking with this.  The patient was to take clindamycin for the 10 days, if he got worse or did not improve I would consider giving her a Rocephin injection in the office.  The clindamycin refill was for her to take on vacation and not to continue taking now.  If she has not improved at all she really needs to be reseen and she can always see Dr. Galeana for second opinion if she would like.

## 2024-08-20 NOTE — TELEPHONE ENCOUNTER
You wanted patient to take extra of Clindamycin with her while on vacation but you only wrote Rx for #30 but was written for tid for 20 days with one refill. She picked up refill today to take it for the 20 days but will not have any meds to take with her on vacation.       Call patient back   [facility]

## 2024-10-03 ENCOUNTER — HOSPITAL ENCOUNTER (OUTPATIENT)
Dept: ULTRASOUND IMAGING | Age: 79
Discharge: HOME | End: 2024-10-03
Attending: UROLOGY
Payer: MEDICARE

## 2024-10-03 DIAGNOSIS — D17.9 ANGIOMYOLIPOMA: ICD-10-CM

## 2024-10-03 PROCEDURE — 76770 US EXAM ABDO BACK WALL COMP: CPT

## 2024-10-10 ENCOUNTER — OFFICE VISIT (OUTPATIENT)
Dept: UROLOGY | Age: 79
End: 2024-10-10

## 2024-10-10 ENCOUNTER — TELEPHONE (OUTPATIENT)
Dept: UROLOGY | Age: 79
End: 2024-10-10

## 2024-10-10 VITALS — WEIGHT: 114 LBS | RESPIRATION RATE: 16 BRPM | HEIGHT: 57 IN | BODY MASS INDEX: 24.59 KG/M2

## 2024-10-10 DIAGNOSIS — D17.9 ANGIOMYOLIPOMA: Primary | ICD-10-CM

## 2024-10-10 DIAGNOSIS — R39.14 FEELING OF INCOMPLETE BLADDER EMPTYING: ICD-10-CM

## 2024-10-10 DIAGNOSIS — Z90.5 SOLITARY KIDNEY, ACQUIRED: ICD-10-CM

## 2024-10-10 LAB
BILIRUBIN, URINE: NEGATIVE
BLOOD URINE, POC: NEGATIVE
CHARACTER, URINE: CLEAR
COLOR, UA: YELLOW
GLUCOSE URINE: NEGATIVE MG/DL
KETONES, URINE: NEGATIVE
LEUKOCYTE CLUMPS, URINE: ABNORMAL
NITRITE, URINE: POSITIVE
PH, URINE: 6 (ref 5–9)
POST VOID RESIDUAL (PVR): 41 ML
PROTEIN, URINE: NEGATIVE MG/DL
SPECIFIC GRAVITY UA: 1.02 (ref 1–1.03)
UROBILINOGEN, URINE: 0.2 EU/DL (ref 0–1)

## 2024-10-10 NOTE — TELEPHONE ENCOUNTER
Please notify the patient that her urine is grossly positive. However, no need to treat since she is asymptomatic    The patient should go to the ED should they develop fever, chills, nausea, vomiting, chest pain, SOB, calf pain, feelings of incomplete emptying, or should they otherwise feel they need evaluated

## 2024-10-10 NOTE — TELEPHONE ENCOUNTER
Patient scheduled for MRI ABDOMEN W WO  at [facility] on 10/2/25.  Arrival of 11 AM for a 1130 AM scan time.  Order mailed with instructions or given to the patient in the office

## 2024-10-10 NOTE — TELEPHONE ENCOUNTER
Patient advised of the urine results and no need to treat since asymptomatic. She voiced understanding.

## 2024-10-10 NOTE — PATIENT INSTRUCTIONS
MRI in 1 year  Call with questions, comments, or concerns. I recommend going to the ED for further evaluation if you develop fever, chills, nausea, vomiting, chest pain, SOB, or calf pain.

## 2024-10-10 NOTE — PROGRESS NOTES
[facility] [place] [facility]  [facility]  [address]  [address]  [facility] [address]  Dept: [phone]  Loc: [phone]    Visit Date: 10/10/2024        HPI:     HPI  Ms. Stack is a 79-year-old female that presents in follow-up.     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 Currently following with Dr. Galvez. Medical hx of CKD stage III    She is here to review renal US findings. Renal US is without significant change.     Current Outpatient Medications   Medication Sig Dispense Refill    cyclobenzaprine (FLEXERIL) 10 MG tablet Take 1 tablet by mouth at bedtime 90 tablet 3    atorvastatin (LIPITOR) 10 MG tablet TAKE 1 TABLET BY MOUTH EVERY DAY 90 tablet 3    losartan (COZAAR) 25 MG tablet TAKE 1 TABLET BY MOUTH EVERY DAY 90 tablet 3    ibandronate (BONIVA) 150 MG tablet TAKE 1 TABLET BY MOUTH EVERY 30 DAYS WITH A FULL GLASS OF WATER AND ON AN EMPTY STOMACH. DO NOT TAKE ANYTHING ELSE OR LIE DOWN FOR 60 MINUTES 3 tablet 3    gabapentin (NEURONTIN) 300 MG capsule TAKE 1 CAPSULE BY MOUTH TWICE DAILY 180 capsule 1    Multiple Vitamins-Minerals (PRESERVISION AREDS 2 PO) Take by mouth      Niacin (VITAMIN B-3 PO) Take by mouth      Dextromethorphan-guaiFENesin (ROBITUSSIN DM PO) Take by mouth daily as needed      ketotifen (ZADITOR) 0.025 % ophthalmic solution Place 1 drop into both eyes 2 times daily      Propylene Glycol (SYSTANE COMPLETE OP) Apply 1 drop to eye 2 times daily      lansoprazole (PREVACID SOLUTAB) 15 MG disintegrating tablet Take 1 tablet by mouth daily      acetaminophen (TYLENOL) 500 MG tablet Take 2 tablets by mouth at bedtime      Calcium + D (CALTRATE) 600-200 MG-UNIT tablet Take 2 tablets by mouth daily.        therapeutic multivitamin-minerals (THERAGRAN-M) tablet Take 1 tablet by mouth

## 2024-10-14 RX ORDER — GABAPENTIN 300 MG/1
CAPSULE ORAL
Qty: 180 CAPSULE | Refills: 1 | Status: SHIPPED | OUTPATIENT
Start: 2024-10-14 | End: 2025-01-14

## 2025-01-10 NOTE — PROGRESS NOTES
Medicare Annual Wellness Visit    Martina Go is here for Medicare AWV, Immunizations (Asking if her and  should have covid vaccine), Cholesterol Problem (Wants cholesterol checked), Discuss Medications (Pt is requesting antibiotics for Derm & dentist appointments), and X-ray  (Discuss x-ray for her leg)    Assessment & Plan   Routine general medical examination at a health care facility  -     Lipid Panel; Future  -     Comprehensive Metabolic Panel; Future  Primary hypertension  Hyperlipidemia, unspecified hyperlipidemia type  -     Lipid Panel; Future  -     Comprehensive Metabolic Panel; Future    Recommendations for Preventive Services Due: see orders and patient instructions/AVS.  Recommended screening schedule for the next 5-10 years is provided to the patient in written form: see Patient Instructions/AVS.     No follow-ups on file. Subjective   The following acute and/or chronic problems were also addressed today:   Diagnosis Orders   1. Routine general medical examination at a health care facility  Lipid Panel    Comprehensive Metabolic Panel      2. Primary hypertension        3. Hyperlipidemia, unspecified hyperlipidemia type  Lipid Panel    Comprehensive Metabolic Panel            Patient's complete Health Risk Assessment and screening values have been reviewed and are found in Flowsheets. The following problems were reviewed today and where indicated follow up appointments were made and/or referrals ordered.     Positive Risk Factor Screenings with Interventions:               General HRA Questions:  Select all that apply: (!) New or Increased Pain    Pain Interventions:  See A/P for plan and any pertinent orders       Weight and Activity:  Physical Activity: Inactive    Days of Exercise per Week: 0 days    Minutes of Exercise per Session: 0 min     On average, how many days per week do you engage in moderate to strenuous exercise (like a brisk walk)?: 0 days  Have you lost any weight
done

## 2025-01-28 ENCOUNTER — TELEPHONE (OUTPATIENT)
Dept: NEPHROLOGY | Age: 80
End: 2025-01-28

## 2025-01-28 DIAGNOSIS — E87.5 HYPERKALEMIA: Primary | ICD-10-CM

## 2025-01-28 LAB
ANION GAP SERPL CALCULATED.3IONS-SCNC: 12 MMOL/L (ref 7–16)
BUN BLDV-MCNC: 19 MG/DL (ref 8–23)
CALCIUM SERPL-MCNC: 10.3 MG/DL (ref 8.6–10.5)
CHLORIDE BLD-SCNC: 104 MMOL/L (ref 96–107)
CO2: 25 MMOL/L (ref 18–32)
CREAT SERPL-MCNC: 1.59 MG/DL (ref 0.51–1.15)
CREATINE, URINE: 64.7 MG/DL
EGFR IF NONAFRICAN AMERICAN: 33 ML/MIN/1.73M2
GLUCOSE: 87 MG/DL (ref 65–125)
POTASSIUM SERPL-SCNC: 5.7 MMOL/L (ref 3.5–5.4)
PROTEIN, URINE: 7.2 MG/DL
PROTEIN/CREAT RATIO: 0.11 G/G (ref 0–0.2)
SODIUM BLD-SCNC: 141 MMOL/L (ref 135–148)

## 2025-01-28 NOTE — TELEPHONE ENCOUNTER
Spoke to pt, she understands lab results and that she needs to repeat her potassium lab. [facility] [address]    Lab order pending

## 2025-01-28 NOTE — TELEPHONE ENCOUNTER
----- Message from Dr. Reji Galvez MD sent at 1/28/2025 10:20 AM EST -----  Lab reports K may be falsely increased  Repeat K level

## 2025-01-31 LAB — POTASSIUM SERPL-SCNC: 5.1 MMOL/L (ref 3.5–5.4)

## 2025-02-06 ENCOUNTER — OFFICE VISIT (OUTPATIENT)
Dept: NEPHROLOGY | Age: 80
End: 2025-02-06
Payer: MEDICARE

## 2025-02-06 VITALS
HEART RATE: 114 BPM | OXYGEN SATURATION: 96 % | BODY MASS INDEX: 24.98 KG/M2 | WEIGHT: 119 LBS | HEIGHT: 58 IN | DIASTOLIC BLOOD PRESSURE: 80 MMHG | SYSTOLIC BLOOD PRESSURE: 133 MMHG

## 2025-02-06 DIAGNOSIS — I12.9 HYPERTENSIVE RENAL DISEASE, STAGE 1 THROUGH STAGE 4 OR UNSPECIFIED CHRONIC KIDNEY DISEASE: ICD-10-CM

## 2025-02-06 DIAGNOSIS — N18.32 CHRONIC KIDNEY DISEASE, STAGE 3B (HCC): Primary | ICD-10-CM

## 2025-02-06 DIAGNOSIS — E87.5 HYPERKALEMIA: ICD-10-CM

## 2025-02-06 PROCEDURE — G8399 PT W/DXA RESULTS DOCUMENT: HCPCS | Performed by: INTERNAL MEDICINE

## 2025-02-06 PROCEDURE — 1159F MED LIST DOCD IN RCRD: CPT | Performed by: INTERNAL MEDICINE

## 2025-02-06 PROCEDURE — 1036F TOBACCO NON-USER: CPT | Performed by: INTERNAL MEDICINE

## 2025-02-06 PROCEDURE — G8427 DOCREV CUR MEDS BY ELIG CLIN: HCPCS | Performed by: INTERNAL MEDICINE

## 2025-02-06 PROCEDURE — 3075F SYST BP GE 130 - 139MM HG: CPT | Performed by: INTERNAL MEDICINE

## 2025-02-06 PROCEDURE — 99213 OFFICE O/P EST LOW 20 MIN: CPT | Performed by: INTERNAL MEDICINE

## 2025-02-06 PROCEDURE — G8420 CALC BMI NORM PARAMETERS: HCPCS | Performed by: INTERNAL MEDICINE

## 2025-02-06 PROCEDURE — 3079F DIAST BP 80-89 MM HG: CPT | Performed by: INTERNAL MEDICINE

## 2025-02-06 PROCEDURE — 1090F PRES/ABSN URINE INCON ASSESS: CPT | Performed by: INTERNAL MEDICINE

## 2025-02-06 PROCEDURE — 1123F ACP DISCUSS/DSCN MKR DOCD: CPT | Performed by: INTERNAL MEDICINE

## 2025-03-01 LAB — POTASSIUM SERPL-SCNC: 5 MMOL/L (ref 3.5–5.4)

## 2025-03-04 ENCOUNTER — TELEPHONE (OUTPATIENT)
Dept: NEPHROLOGY | Age: 80
End: 2025-03-04

## 2025-03-04 DIAGNOSIS — E87.5 HYPERKALEMIA: Primary | ICD-10-CM

## 2025-04-08 LAB — POTASSIUM SERPL-SCNC: 4.7 MMOL/L (ref 3.5–5.4)

## 2025-04-14 RX ORDER — GABAPENTIN 300 MG/1
300 CAPSULE ORAL 2 TIMES DAILY
Qty: 180 CAPSULE | Refills: 0 | Status: SHIPPED | OUTPATIENT
Start: 2025-04-14 | End: 2025-07-13

## 2025-04-17 DIAGNOSIS — Z78.0 OSTEOPENIA AFTER MENOPAUSE: ICD-10-CM

## 2025-04-17 DIAGNOSIS — M85.80 OSTEOPENIA AFTER MENOPAUSE: ICD-10-CM

## 2025-04-17 RX ORDER — IBANDRONATE SODIUM 150 MG/1
TABLET, FILM COATED ORAL
Qty: 3 TABLET | Refills: 0 | Status: SHIPPED | OUTPATIENT
Start: 2025-04-17

## 2025-05-16 SDOH — ECONOMIC STABILITY: INCOME INSECURITY: IN THE LAST 12 MONTHS, WAS THERE A TIME WHEN YOU WERE NOT ABLE TO PAY THE MORTGAGE OR RENT ON TIME?: NO

## 2025-05-16 SDOH — HEALTH STABILITY: PHYSICAL HEALTH: ON AVERAGE, HOW MANY DAYS PER WEEK DO YOU ENGAGE IN MODERATE TO STRENUOUS EXERCISE (LIKE A BRISK WALK)?: 0 DAYS

## 2025-05-16 SDOH — ECONOMIC STABILITY: FOOD INSECURITY: WITHIN THE PAST 12 MONTHS, THE FOOD YOU BOUGHT JUST DIDN'T LAST AND YOU DIDN'T HAVE MONEY TO GET MORE.: NEVER TRUE

## 2025-05-16 SDOH — ECONOMIC STABILITY: TRANSPORTATION INSECURITY
IN THE PAST 12 MONTHS, HAS THE LACK OF TRANSPORTATION KEPT YOU FROM MEDICAL APPOINTMENTS OR FROM GETTING MEDICATIONS?: NO

## 2025-05-16 SDOH — HEALTH STABILITY: PHYSICAL HEALTH: ON AVERAGE, HOW MANY MINUTES DO YOU ENGAGE IN EXERCISE AT THIS LEVEL?: 0 MIN

## 2025-05-16 SDOH — ECONOMIC STABILITY: FOOD INSECURITY: WITHIN THE PAST 12 MONTHS, YOU WORRIED THAT YOUR FOOD WOULD RUN OUT BEFORE YOU GOT MONEY TO BUY MORE.: NEVER TRUE

## 2025-05-16 ASSESSMENT — LIFESTYLE VARIABLES
HOW MANY STANDARD DRINKS CONTAINING ALCOHOL DO YOU HAVE ON A TYPICAL DAY: PATIENT DOES NOT DRINK
HOW OFTEN DO YOU HAVE A DRINK CONTAINING ALCOHOL: 1
HOW OFTEN DO YOU HAVE SIX OR MORE DRINKS ON ONE OCCASION: 1
HOW MANY STANDARD DRINKS CONTAINING ALCOHOL DO YOU HAVE ON A TYPICAL DAY: 0
HOW OFTEN DO YOU HAVE A DRINK CONTAINING ALCOHOL: NEVER

## 2025-05-16 ASSESSMENT — PATIENT HEALTH QUESTIONNAIRE - PHQ9
SUM OF ALL RESPONSES TO PHQ QUESTIONS 1-9: 0
2. FEELING DOWN, DEPRESSED OR HOPELESS: NOT AT ALL
SUM OF ALL RESPONSES TO PHQ QUESTIONS 1-9: 0
SUM OF ALL RESPONSES TO PHQ QUESTIONS 1-9: 0
1. LITTLE INTEREST OR PLEASURE IN DOING THINGS: NOT AT ALL
SUM OF ALL RESPONSES TO PHQ QUESTIONS 1-9: 0

## 2025-05-19 ENCOUNTER — OFFICE VISIT (OUTPATIENT)
Dept: FAMILY MEDICINE CLINIC | Age: 80
End: 2025-05-19

## 2025-05-19 VITALS
TEMPERATURE: 98.3 F | BODY MASS INDEX: 24.89 KG/M2 | DIASTOLIC BLOOD PRESSURE: 72 MMHG | WEIGHT: 119.1 LBS | OXYGEN SATURATION: 99 % | HEART RATE: 115 BPM | SYSTOLIC BLOOD PRESSURE: 124 MMHG

## 2025-05-19 DIAGNOSIS — Z00.00 MEDICARE ANNUAL WELLNESS VISIT, SUBSEQUENT: Primary | ICD-10-CM

## 2025-05-19 DIAGNOSIS — I10 PRIMARY HYPERTENSION: ICD-10-CM

## 2025-05-19 DIAGNOSIS — E78.5 HYPERLIPIDEMIA, UNSPECIFIED HYPERLIPIDEMIA TYPE: ICD-10-CM

## 2025-05-23 LAB
ALBUMIN: 4.8 G/DL (ref 3.5–5.2)
ALK PHOSPHATASE: 96 U/L (ref 30–146)
ALT SERPL-CCNC: 32 U/L (ref 5–33)
ANION GAP SERPL CALCULATED.3IONS-SCNC: 16 MMOL/L (ref 7–16)
AST SERPL-CCNC: 37 U/L (ref 9–40)
BASOPHILS ABSOLUTE: 0.08 K/UL (ref 0–0.2)
BASOPHILS RELATIVE PERCENT: 1.6 % (ref 0–2)
BILIRUB SERPL-MCNC: 0.5 MG/DL
BUN BLDV-MCNC: 16 MG/DL (ref 8–23)
CALCIUM SERPL-MCNC: 10.6 MG/DL (ref 8.6–10.5)
CHLORIDE BLD-SCNC: 99 MMOL/L (ref 96–107)
CHOLESTEROL, TOTAL: 172 MG/DL (ref 100–199)
CHOLESTEROL/HDL RATIO: 2.1 (ref 2–4.5)
CO2: 25 MMOL/L (ref 18–32)
CREAT SERPL-MCNC: 1.52 MG/DL (ref 0.51–1.15)
EGFR IF NONAFRICAN AMERICAN: 35 ML/MIN/1.73M2
EOSINOPHILS ABSOLUTE: 0.4 K/UL (ref 0–0.8)
EOSINOPHILS RELATIVE PERCENT: 7.8 % (ref 0–5)
GLUCOSE: 91 MG/DL (ref 70–100)
HCT VFR BLD CALC: 40.1 % (ref 35–47)
HDLC SERPL-MCNC: 81 MG/DL
HEMOGLOBIN: 13.4 G/DL (ref 11.9–16)
IMMATURE GRANS (ABS): 0.01 K/UL (ref 0–0.06)
IMMATURE GRANULOCYTES %: 0.2 % (ref 0–2)
LDL CHOLESTEROL: 66 MG/DL
LDL/HDL RATIO: 0.8
LYMPHOCYTES ABSOLUTE: 1.63 K/UL (ref 0.9–5.2)
LYMPHOCYTES RELATIVE PERCENT: 31.8 % (ref 20–45)
MCH RBC QN AUTO: 30.1 PG (ref 26–33)
MCHC RBC AUTO-ENTMCNC: 33.4 G/DL (ref 32–35)
MCV RBC AUTO: 90 FL (ref 75–100)
MONOCYTES ABSOLUTE: 0.38 K/UL (ref 0.1–1)
MONOCYTES RELATIVE PERCENT: 7.4 % (ref 0–13)
NEUTROPHILS ABSOLUTE: 2.62 K/UL (ref 1.9–8)
NEUTROPHILS RELATIVE PERCENT: 51.2 % (ref 45–75)
PDW BLD-RTO: 13 % (ref 11.2–14.8)
PLATELET # BLD: 363 THOUS/CMM (ref 140–440)
POTASSIUM SERPL-SCNC: 4.5 MMOL/L (ref 3.5–5.4)
RBC # BLD: 4.45 MILL/CMM (ref 3.8–5.2)
SODIUM BLD-SCNC: 140 MMOL/L (ref 135–148)
TOTAL PROTEIN: 7.8 G/DL (ref 6–8.3)
TRIGL SERPL-MCNC: 127 MG/DL (ref 20–149)
VLDLC SERPL CALC-MCNC: 25 MG/DL
WBC # BLD: 5.1 THDS/CMM (ref 3.6–11)

## 2025-05-25 ENCOUNTER — RESULTS FOLLOW-UP (OUTPATIENT)
Dept: FAMILY MEDICINE CLINIC | Age: 80
End: 2025-05-25

## 2025-06-10 RX ORDER — ATORVASTATIN CALCIUM 10 MG/1
10 TABLET, FILM COATED ORAL DAILY
Qty: 90 TABLET | Refills: 3 | Status: SHIPPED | OUTPATIENT
Start: 2025-06-10

## 2025-06-10 RX ORDER — LOSARTAN POTASSIUM 25 MG/1
25 TABLET ORAL DAILY
Qty: 90 TABLET | Refills: 3 | Status: SHIPPED | OUTPATIENT
Start: 2025-06-10

## 2025-07-10 RX ORDER — GABAPENTIN 300 MG/1
300 CAPSULE ORAL 2 TIMES DAILY
Qty: 180 CAPSULE | Refills: 1 | Status: SHIPPED | OUTPATIENT
Start: 2025-07-10 | End: 2026-01-06

## 2025-07-13 DIAGNOSIS — M85.80 OSTEOPENIA AFTER MENOPAUSE: ICD-10-CM

## 2025-07-13 DIAGNOSIS — Z78.0 OSTEOPENIA AFTER MENOPAUSE: ICD-10-CM

## 2025-07-14 RX ORDER — IBANDRONATE SODIUM 150 MG/1
TABLET, FILM COATED ORAL
Qty: 3 TABLET | Refills: 0 | Status: SHIPPED | OUTPATIENT
Start: 2025-07-14

## 2025-08-23 ENCOUNTER — PATIENT MESSAGE (OUTPATIENT)
Dept: FAMILY MEDICINE CLINIC | Age: 80
End: 2025-08-23

## 2025-08-23 DIAGNOSIS — M25.551 PAIN OF RIGHT HIP: Primary | ICD-10-CM

## 2025-08-27 ENCOUNTER — HOSPITAL ENCOUNTER (OUTPATIENT)
Age: 80
Discharge: HOME | End: 2025-08-27
Payer: MEDICARE

## 2025-08-27 ENCOUNTER — HOSPITAL ENCOUNTER (OUTPATIENT)
Dept: GENERAL RADIOLOGY | Age: 80
Discharge: HOME | End: 2025-08-27
Payer: MEDICARE

## 2025-08-27 DIAGNOSIS — M25.551 PAIN OF RIGHT HIP: ICD-10-CM

## 2025-08-27 PROCEDURE — 71100 X-RAY EXAM RIBS UNI 2 VIEWS: CPT

## 2025-08-27 PROCEDURE — 73502 X-RAY EXAM HIP UNI 2-3 VIEWS: CPT

## (undated) DEVICE — COUNTER NDL 10 COUNT HLD 20 FOAM BLK SGL MAG

## (undated) DEVICE — 6 ML SYRINGE LUER-LOCK TIP: Brand: MONOJECT

## (undated) DEVICE — SYRINGE MED 3ML CLR PLAS STD N CTRL LUERLOCK TIP DISP

## (undated) DEVICE — APPLICATOR MEDICATED 10.5 CC SOLUTION CLR STRL CHLORAPREP

## (undated) DEVICE — GLOVE BIOGEL POWDER FREE SZ 8

## (undated) DEVICE — 1840 FOAM BLOCK NEEDLE COUNTER: Brand: DEVON

## (undated) DEVICE — GAUZE,SPONGE,4"X4",16PLY,XRAY,STRL,LF: Brand: MEDLINE

## (undated) DEVICE — Device

## (undated) DEVICE — DRAPE SHEET ULTRAGARD: Brand: MEDLINE

## (undated) DEVICE — NEEDLE,25GX1.5",REG,BEVEL: Brand: MEDLINE

## (undated) DEVICE — NEEDLE HYPO 18GA L1.5IN THN WALL PIVOTING SHLD BVL ORIENTED

## (undated) DEVICE — SYRINGE, LUER LOCK, 10ML: Brand: MEDLINE

## (undated) DEVICE — NEEDLE SPNL 22GA L3.5IN BLK HUB S STL REG WALL FIT STYL W/

## (undated) DEVICE — MARKER,SKIN,WI/RULER AND LABELS: Brand: MEDLINE

## (undated) DEVICE — GAUZE,SPONGE,4"X4",12PLY,STERILE,LF,2'S: Brand: MEDLINE

## (undated) DEVICE — LABEL MED DRUG CUST

## (undated) DEVICE — COUNTER NDL 40 COUNT HLD 70 FOAM BLK ADH W/ MAG

## (undated) DEVICE — NEEDLE SYR 18GA L1.5IN RED PLAS HUB S STL BLNT FILL W/O

## (undated) DEVICE — SYRINGE MED 10ML LUERLOCK TIP W/O SFTY DISP

## (undated) DEVICE — HYPODERMIC SAFETY NEEDLE: Brand: MAGELLAN

## (undated) DEVICE — VIAL ACCESS CANNULA,SMART TIP: Brand: MONOJECT

## (undated) DEVICE — TOWEL,OR,DSP,ST,BLUE,STD,6/PK,12PK/CS: Brand: MEDLINE

## (undated) DEVICE — NEEDLE SPINAL 22GA L3.5IN SPINOCAN

## (undated) DEVICE — NON-DEHP CATHETER EXTENSION SET, MALE LUER LOCK ADAPTER

## (undated) DEVICE — TOWEL,OR,DSP,ST,BLUE,STD,4/PK,20PK/CS: Brand: MEDLINE

## (undated) DEVICE — GLOVE SURG SZ 8 L11.77IN FNGR THK9.8MIL STRW LTX POLYMER

## (undated) DEVICE — GAUZE SPONGES,USP TYPE VII GAUZE, 12 PLY: Brand: CURITY

## (undated) DEVICE — SHEET,DRAPE,3/4,53X77,STERILE: Brand: MEDLINE